# Patient Record
Sex: MALE | Race: WHITE | Employment: FULL TIME | URBAN - METROPOLITAN AREA
[De-identification: names, ages, dates, MRNs, and addresses within clinical notes are randomized per-mention and may not be internally consistent; named-entity substitution may affect disease eponyms.]

---

## 2021-12-07 ENCOUNTER — APPOINTMENT (OUTPATIENT)
Dept: CT IMAGING | Age: 86
End: 2021-12-07
Payer: MEDICARE

## 2021-12-07 ENCOUNTER — HOSPITAL ENCOUNTER (EMERGENCY)
Age: 86
Discharge: HOME | End: 2021-12-07
Attending: EMERGENCY MEDICINE
Payer: MEDICARE

## 2021-12-07 VITALS
TEMPERATURE: 98 F | DIASTOLIC BLOOD PRESSURE: 124 MMHG | OXYGEN SATURATION: 96 % | SYSTOLIC BLOOD PRESSURE: 182 MMHG | WEIGHT: 133 LBS | RESPIRATION RATE: 16 BRPM | HEART RATE: 106 BPM

## 2021-12-07 DIAGNOSIS — S00.83XA CONTUSION OF FACE, INITIAL ENCOUNTER: Primary | ICD-10-CM

## 2021-12-07 DIAGNOSIS — R04.0 EPISTAXIS DUE TO TRAUMA: ICD-10-CM

## 2021-12-07 LAB
ABSOLUTE EOS #: 0.1 K/UL (ref 0–0.4)
ABSOLUTE IMMATURE GRANULOCYTE: ABNORMAL K/UL (ref 0–0.3)
ABSOLUTE LYMPH #: 0.5 K/UL (ref 1–4.8)
ABSOLUTE MONO #: 0.6 K/UL (ref 0.1–1.3)
ANION GAP SERPL CALCULATED.3IONS-SCNC: 9 MMOL/L (ref 9–17)
BASOPHILS # BLD: 1 % (ref 0–2)
BASOPHILS ABSOLUTE: 0.1 K/UL (ref 0–0.2)
BUN BLDV-MCNC: 17 MG/DL (ref 8–23)
BUN/CREAT BLD: ABNORMAL (ref 9–20)
CALCIUM SERPL-MCNC: 8.8 MG/DL (ref 8.6–10.4)
CHLORIDE BLD-SCNC: 88 MMOL/L (ref 98–107)
CO2: 25 MMOL/L (ref 20–31)
CREAT SERPL-MCNC: 0.55 MG/DL (ref 0.7–1.2)
DIFFERENTIAL TYPE: ABNORMAL
EOSINOPHILS RELATIVE PERCENT: 1 % (ref 0–4)
GFR AFRICAN AMERICAN: >60 ML/MIN
GFR NON-AFRICAN AMERICAN: >60 ML/MIN
GFR SERPL CREATININE-BSD FRML MDRD: ABNORMAL ML/MIN/{1.73_M2}
GFR SERPL CREATININE-BSD FRML MDRD: ABNORMAL ML/MIN/{1.73_M2}
GLUCOSE BLD-MCNC: 128 MG/DL (ref 70–99)
HCT VFR BLD CALC: 29 % (ref 41–53)
HEMOGLOBIN: 10 G/DL (ref 13.5–17.5)
IMMATURE GRANULOCYTES: ABNORMAL %
INR BLD: 1.6
LYMPHOCYTES # BLD: 5 % (ref 24–44)
MCH RBC QN AUTO: 30.2 PG (ref 26–34)
MCHC RBC AUTO-ENTMCNC: 34.6 G/DL (ref 31–37)
MCV RBC AUTO: 87.3 FL (ref 80–100)
MONOCYTES # BLD: 6 % (ref 1–7)
NRBC AUTOMATED: ABNORMAL PER 100 WBC
PARTIAL THROMBOPLASTIN TIME: 40.1 SEC (ref 24–36)
PDW BLD-RTO: 15.9 % (ref 11.5–14.9)
PLATELET # BLD: 274 K/UL (ref 150–450)
PLATELET ESTIMATE: ABNORMAL
PMV BLD AUTO: 6.6 FL (ref 6–12)
POTASSIUM SERPL-SCNC: 4.3 MMOL/L (ref 3.7–5.3)
PROTHROMBIN TIME: 18.9 SEC (ref 11.8–14.6)
RBC # BLD: 3.32 M/UL (ref 4.5–5.9)
RBC # BLD: ABNORMAL 10*6/UL
SEG NEUTROPHILS: 87 % (ref 36–66)
SEGMENTED NEUTROPHILS ABSOLUTE COUNT: 9 K/UL (ref 1.3–9.1)
SODIUM BLD-SCNC: 122 MMOL/L (ref 135–144)
WBC # BLD: 10.2 K/UL (ref 3.5–11)
WBC # BLD: ABNORMAL 10*3/UL

## 2021-12-07 PROCEDURE — 99284 EMERGENCY DEPT VISIT MOD MDM: CPT

## 2021-12-07 PROCEDURE — 70450 CT HEAD/BRAIN W/O DYE: CPT

## 2021-12-07 PROCEDURE — 85730 THROMBOPLASTIN TIME PARTIAL: CPT

## 2021-12-07 PROCEDURE — 30903 CONTROL OF NOSEBLEED: CPT

## 2021-12-07 PROCEDURE — 6370000000 HC RX 637 (ALT 250 FOR IP): Performed by: EMERGENCY MEDICINE

## 2021-12-07 PROCEDURE — 80048 BASIC METABOLIC PNL TOTAL CA: CPT

## 2021-12-07 PROCEDURE — 72125 CT NECK SPINE W/O DYE: CPT

## 2021-12-07 PROCEDURE — 85025 COMPLETE CBC W/AUTO DIFF WBC: CPT

## 2021-12-07 PROCEDURE — 85610 PROTHROMBIN TIME: CPT

## 2021-12-07 PROCEDURE — 93005 ELECTROCARDIOGRAM TRACING: CPT | Performed by: EMERGENCY MEDICINE

## 2021-12-07 PROCEDURE — 70486 CT MAXILLOFACIAL W/O DYE: CPT

## 2021-12-07 PROCEDURE — 36415 COLL VENOUS BLD VENIPUNCTURE: CPT

## 2021-12-07 RX ORDER — TRANEXAMIC ACID 100 MG/ML
15 INJECTION, SOLUTION INTRAVENOUS ONCE
Status: DISCONTINUED | OUTPATIENT
Start: 2021-12-07 | End: 2021-12-08 | Stop reason: HOSPADM

## 2021-12-07 RX ORDER — ACETAMINOPHEN 500 MG
1000 TABLET ORAL ONCE
Status: DISCONTINUED | OUTPATIENT
Start: 2021-12-07 | End: 2021-12-08 | Stop reason: HOSPADM

## 2021-12-07 RX ORDER — OXYMETAZOLINE HYDROCHLORIDE 0.05 G/100ML
2 SPRAY NASAL ONCE
Status: COMPLETED | OUTPATIENT
Start: 2021-12-07 | End: 2021-12-07

## 2021-12-07 RX ADMIN — OXYMETAZOLINE HYDROCHLORIDE 2 SPRAY: 0.05 SPRAY NASAL at 15:50

## 2021-12-07 ASSESSMENT — ENCOUNTER SYMPTOMS
CHEST TIGHTNESS: 0
VOMITING: 0
NAUSEA: 0
DIARRHEA: 0
ABDOMINAL PAIN: 0
SHORTNESS OF BREATH: 0
COLOR CHANGE: 1
CONSTIPATION: 0

## 2021-12-07 NOTE — ED NOTES
Assisted patient to bed side commode patient tolerated well. Bleeding from nose still currently. Patient refusing tylenol at this time.       TEQUILA Guerra  12/07/21 1657

## 2021-12-07 NOTE — ED PROVIDER NOTES
[address]  Emergency Department Encounter  EmergencyMedicine Resident     Pt Name:Jose Elias Rao Ana Maria Perez  MRN: [id number]  [place] 11/24/1931  Date of evaluation: 12/7/21  PCP:  No primary care provider on file. CHIEF COMPLAINT       Chief Complaint   Patient presents with    Fall    Head Injury     on coumadin       HISTORY OF PRESENT ILLNESS  (Location/Symptom, Timing/Onset, Context/Setting, Quality, Duration, Modifying Factors, Severity.)      [facility] Dr Ana Maria Tariqs is a 80 y.o. male who presents with after mechanical fall in the parking lot. Patient states that he only hit his chin and nose, denies hitting his head, denies loss of consciousness. Patient is on Coumadin, does not know what his INR is. Patient denies any lightheadedness and dizziness. Patient denies any nausea, vomiting, chest pain, shortness of breath, abdominal pain, change in urination or bowel habits. Patient states that he denies any pain. Patient stated that he wants this front tooth pulled, tooth noted to be loose. Patient also had 2 other teeth that were dislodged after fall. Patient adamantly denies that he syncopated, states that he tripped over a crack when ambulating. PAST MEDICAL / SURGICAL / SOCIAL / FAMILY HISTORY      has no past medical history on file. No additional pertinent     has no past surgical history on file.   No additional pertinent    Social History     Socioeconomic History    Marital status: Single     Spouse name: Not on file    Number of children: Not on file    Years of education: Not on file    Highest education level: Not on file   Occupational History    Not on file   Tobacco Use    Smoking status: Not on file    Smokeless tobacco: Not on file   Substance and Sexual Activity    Alcohol use: Not on file    Drug use: Not on file    Sexual activity: Not on file   Other Topics Concern    Not on file   Social History Narrative    Not on file     Social Determinants of Health Financial Resource Strain:     Difficulty of Paying Living Expenses: Not on file   Food Insecurity:     Worried About Running Out of Food in the Last Year: Not on file    Salome of Food in the Last Year: Not on file   Transportation Needs:     Lack of Transportation (Medical): Not on file    Lack of Transportation (Non-Medical): Not on file   Physical Activity:     Days of Exercise per Week: Not on file    Minutes of Exercise per Session: Not on file   Stress:     Feeling of Stress : Not on file   Social Connections:     Frequency of Communication with Friends and Family: Not on file    Frequency of Social Gatherings with Friends and Family: Not on file    Attends Sabianist Services: Not on file    Active Member of [address] [place] [address] or Organizations: Not on file    Attends Club or Organization Meetings: Not on file    Marital Status: Not on file   Intimate Partner Violence:     Fear of Current or Ex-Partner: Not on file    Emotionally Abused: Not on file    Physically Abused: Not on file    Sexually Abused: Not on file   Housing Stability:     Unable to Pay for Housing in the Last Year: Not on file    Number of [place] in the Last Year: Not on file    Unstable Housing in the Last Year: Not on file       No family history on file. Allergies:  Sulfa antibiotics    Home Medications:  Prior to Admission medications    Not on File       REVIEW OF SYSTEMS    (2-9 systems for level 4, 10 or more for level 5)      Review of Systems   Constitutional: Negative for chills and fever. HENT: Positive for nosebleeds. Negative for congestion. Respiratory: Negative for chest tightness and shortness of breath. Cardiovascular: Negative for chest pain and leg swelling. Gastrointestinal: Negative for abdominal pain, constipation, diarrhea, nausea and vomiting. Endocrine: Negative for polyuria. Genitourinary: Negative for difficulty urinating. Skin: Positive for color change (Bruising). Neurological: Negative for dizziness, weakness, light-headedness and headaches. Hematological: Bruises/bleeds easily. Psychiatric/Behavioral: Negative for confusion. PHYSICAL EXAM   (up to 7 for level 4, 8 or more for level 5)      INITIAL VITALS:   BP (!) 182/124   Pulse 106   Temp 98 °F (36.7 °C)   Resp 16   Wt 133 lb (60.3 kg)   SpO2 96%     Physical Exam  Constitutional:       Appearance: Normal appearance. HENT:      Head: Normocephalic and atraumatic. No raccoon eyes or Amor's sign. Jaw: No trismus or tenderness. Right Ear: Tympanic membrane normal.      Left Ear: Tympanic membrane normal.      Ears:      Comments: No hemotympanum noted. Nose: Nasal tenderness present. No septal deviation. Right Nostril: Epistaxis present. No foreign body. Mouth/Throat:      Mouth: Mucous membranes are moist.      Pharynx: Oropharynx is clear. Eyes:      Extraocular Movements: Extraocular movements intact. Conjunctiva/sclera: Conjunctivae normal.   Cardiovascular:      Rate and Rhythm: Normal rate and regular rhythm. Pulses: Normal pulses. Heart sounds: Normal heart sounds. No murmur heard. Pulmonary:      Effort: Pulmonary effort is normal.      Breath sounds: Normal breath sounds. Abdominal:      General: Bowel sounds are normal. There is no distension. Tenderness: There is no abdominal tenderness. There is no guarding. Musculoskeletal:         General: Normal range of motion. Comments: Range of motion noted to be normal with patient's natural movements   Skin:     General: Skin is warm and dry. Findings: No rash (On exposed skin). Neurological:      General: No focal deficit present. Mental Status: He is alert and oriented to person, place, and time.    Psychiatric:         Mood and Affect: Mood normal.         Behavior: Behavior normal.         DIFFERENTIAL  DIAGNOSIS     PLAN (LABS / IMAGING / EKG):  Orders Placed This Encounter   Procedures    CT Head WO Contrast    CT CERVICAL SPINE WO CONTRAST    CT FACIAL BONES WO CONTRAST    CBC Auto Differential    Basic Metabolic Panel w/ Reflex to MG    Protime-INR    APTT    EKG 12 Lead       MEDICATIONS ORDERED:  Orders Placed This Encounter   Medications    oxymetazoline (AFRIN) 0.05 % nasal spray 2 spray    acetaminophen (TYLENOL) tablet 1,000 mg    tranexamic acid (CYKLOKAPRON) injection 905 mg         DIAGNOSTIC RESULTS / EMERGENCY DEPARTMENT COURSE / MDM   LAB RESULTS:  Results for orders placed or performed during the hospital encounter of 12/07/21   CBC Auto Differential   Result Value Ref Range    WBC 10.2 3.5 - 11.0 k/uL    RBC 3.32 (L) 4.5 - 5.9 m/uL    Hemoglobin 10.0 (L) 13.5 - 17.5 g/dL    Hematocrit 29.0 (L) 41 - 53 %    MCV 87.3 80 - 100 fL    MCH 30.2 26 - 34 pg    MCHC 34.6 31 - 37 g/dL    RDW 15.9 (H) 11.5 - 14.9 %    Platelets 110 074 - 155 k/uL    MPV 6.6 6.0 - 12.0 fL    NRBC Automated NOT REPORTED per 100 WBC    Differential Type NOT REPORTED     Seg Neutrophils 87 (H) 36 - 66 %    Lymphocytes 5 (L) 24 - 44 %    Monocytes 6 1 - 7 %    Eosinophils % 1 0 - 4 %    Basophils 1 0 - 2 %    Immature Granulocytes NOT REPORTED 0 %    Segs Absolute 9.00 1.3 - 9.1 k/uL    Absolute Lymph # 0.50 (L) 1.0 - 4.8 k/uL    Absolute Mono # 0.60 0.1 - 1.3 k/uL    Absolute Eos # 0.10 0.0 - 0.4 k/uL    Basophils Absolute 0.10 0.0 - 0.2 k/uL    Absolute Immature Granulocyte NOT REPORTED 0.00 - 0.30 k/uL    WBC Morphology NOT REPORTED     RBC Morphology NOT REPORTED     Platelet Estimate NOT REPORTED    Basic Metabolic Panel w/ Reflex to MG   Result Value Ref Range    Glucose 128 (H) 70 - 99 mg/dL    BUN 17 8 - 23 mg/dL    CREATININE 0.55 (L) 0.70 - 1.20 mg/dL    Bun/Cre Ratio NOT REPORTED 9 - 20    Calcium 8.8 8.6 - 10.4 mg/dL    Sodium 122 (L) 135 - 144 mmol/L    Potassium 4.3 3.7 - 5.3 mmol/L    Chloride 88 (L) 98 - 107 mmol/L    CO2 25 20 - 31 mmol/L    Anion Gap 9 9 - 17 mmol/L    GFR Non-African American >60 >60 mL/min    GFR African American >60 >60 mL/min    GFR Comment          GFR Staging NOT REPORTED    Protime-INR   Result Value Ref Range    Protime 18.9 (H) 11.8 - 14.6 sec    INR 1.6    APTT   Result Value Ref Range    PTT 40.1 (H) 24.0 - 36.0 sec   EKG 12 Lead   Result Value Ref Range    Ventricular Rate 99 BPM    Atrial Rate 202 BPM    QRS Duration 72 ms    Q-T Interval 324 ms    QTc Calculation (Bazett) 415 ms    R Axis 8 degrees    T Axis -11 degrees       RADIOLOGY:  CT CERVICAL SPINE WO CONTRAST   Final Result   No acute abnormality of the cervical spine. CT FACIAL BONES WO CONTRAST   Final Result   Chip fractures of the maxillary nasal processes      Otherwise no facial fracture identified         CT Head WO Contrast   Final Result   No acute intracranial abnormality. 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    EMERGENCY DEPARTMENT COURSE:  ED Course as of 12/07/21 2350   Tue Dec 07, 2021   1526 BP(!): 182/124 [CR]   1954 Offered patient admission due to having difficulty hemostasis to obtain bilateral nares, hypertension, Coumadin, with concerns from coworker about being able to take care of himself at home. Patient alert and oriented, answering questions appropriately and has decision-making capacity. Patient shows to not stay for admission.   Patient was able to ambulate under his own power without any difficulty. Patient states that he will call 911 if he becomes lightheaded or has further bleeding or any other symptoms. Patient be discharged home. [CR]   2004 Patient noted to ambulate without any difficulty with nursing staff. Patient noted to continue to have small drips of blood from the anterior nasal packing. Patient denies any lightheadedness or dizziness. Patient blood pressure did come down after ambulation. [CR]      ED Course User Index  [CR] Alber Diaz,         PROCEDURES:  Patient had nasal packing placed, initially had Mediplex placed without hemostasis of right nare. Patient did have hemostasis of the left nare. Packing was removed and Rhino Rocket, anterior, 5.5 cm was placed in the right with 6 cc of air to inflate device was placed. Patient tolerated procedure well without difficulty. Hemostasis obtained. Patient was observed after placement without any bleeding. CONSULTS:  None    MEDICAL DECISION MAKING:  Patient noted to have a witnessed mechanical fall. Patient denies any pain, chest pain, lightheadedness, dizziness any C-spine tenderness, good range of motion of neck. No change in vision, no concerns for head bleed. With patient on Coumadin CT head was obtained in addition to patient is multiple areas of ecchymosis on the face and CT facial bones were noted to demonstrate nasal bone fracture. Patient had CT C-spine which was noted to be negative due to age and concern for osteoporosis and C-spine injury. Patient had difficult to obtain epistaxis hemorrhage control. Nasal septum was evaluated and no septal hematoma was noted, mild ecchymosis noted though. Afrin was initially attempted in the nares with good hemorrhage control initially until patient stood up to use restroom which caused increased hemorrhage of the right nare.   Patient had the right nare then packed with nasal packing with improvement of hemostasis. Patient then noted to have epistaxis out of the left nare and packing was applied. Patient then had good hemorrhage control at the time of was observed. Patient then stood up to ambulate and had epistaxis from the right nare with packing in place. Packing was removed and Rhino Rocket was placed in the right nare which obtained hemostasis. Patient was advised to be admitted for his age, injury second fall, difficult hemorrhage control of epistaxis, and concerns for ability to perform activities of daily living at his home. Patient was very adamant to go home. Patient was alert and oriented and decision-making capacity. Patient was discharged home based upon his wishes. Patient was provided education to call 911 with worsening bleeding, recurrence of epistaxis, lightheadedness, dizziness or other concerns. 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 Patient was discharged home. CRITICAL CARE:  Please see attending note    FINAL IMPRESSION      1. Contusion of face, initial encounter    2. Epistaxis due to trauma          DISPOSITION / PLAN     DISPOSITION Decision To Discharge 12/07/2021 09:45:03 PM      PATIENT REFERRED TO:  Carmela Powell MD  [address] [place] [address] [phone]    Schedule an appointment as soon as possible for a visit       [address] [place] [address] [address] [address]  [facility] [address]  [address] [place] [address]  [phone]    If symptoms worsen      DISCHARGE MEDICATIONS:  There are no discharge medications for this patient.       Emily Contreras DO  Emergency Medicine Resident    (Please note that portions of thisnote were completed with a voice recognition program.  Efforts were made to edit the dictations but occasionally words are mis-transcribed.)       Jomar Frances, DO  Resident  12/07/21 [address], DO  Resident  12/08/21 5582

## 2021-12-07 NOTE — ED PROVIDER NOTES
[address] ED  eMERGENCY dEPARTMENT eNCOUnter   Attending Attestation     Pt Name: Koffi Hawkins  MRN: [id number]  [place] 11/24/1931  Date of evaluation: 12/7/21       Koffi Hawkins is a 80 y.o. male who presents with Fall and Head Injury (on coumadin)      History:   Patient had a mechanical fall misstep walking out the doors and landed on his face. Patient is on Coumadin because of A. fib. Patient denies loss of consciousness but does have bleeding out of his right nares. No neck pain or back pain. Exam: Vitals:   Vitals:    12/07/21 1524   BP: (!) 182/124   Pulse: 106   Resp: 16   Temp: 98 °F (36.7 °C)   SpO2: 96%   Weight: 33 lb (15 kg)     Patient has ecchymosis and swelling to the chin with bleeding at the right nares. Neurologically intact. I performed a history and physical examination of the patient and discussed management with the resident. I reviewed the residents note and agree with the documented findings and plan of care. Any areas of disagreement are noted on the chart. I was personally present for the key portions of any procedures. I have documented in the chart those procedures where I was not present during the key portions. I have personally reviewed all images and agree with the resident's interpretation. I have reviewed the emergency nurses triage note. I agree with the chief complaint, past medical history, past surgical history, allergies, medications, social and family history as documented unless otherwise noted below. Documentation of the HPI, Physical Exam and Medical Decision Making performed by medical students or scribes is based on my personal performance of the HPI, PE and MDM. I personally evaluated and examined the patient in conjunction with the APC and agree with the assessment, treatment plan, and disposition of the patient as recorded by the APC. Additional findings are as noted.     Heather Tan MD  Attending Emergency  Physician             Norah Key [employer], MD  12/07/21 6741

## 2021-12-07 NOTE — ED NOTES
IV insertion attempted 2 times were unsuccessful. Patient refuses any more attempts and blood draws at this time. Dr. Sara Chaidez.       Mari, RN  12/07/21 [address], RN  12/07/21 3827

## 2021-12-08 LAB
EKG ATRIAL RATE: 202 BPM
EKG Q-T INTERVAL: 324 MS
EKG QRS DURATION: 72 MS
EKG QTC CALCULATION (BAZETT): 415 MS
EKG R AXIS: 8 DEGREES
EKG T AXIS: -11 DEGREES
EKG VENTRICULAR RATE: 99 BPM

## 2021-12-08 PROCEDURE — 93010 ELECTROCARDIOGRAM REPORT: CPT | Performed by: INTERNAL MEDICINE

## 2021-12-08 NOTE — ED NOTES
Ambulated patient in [facility] per Dr. Felipe Denise request.     Kaur Prasad RN  12/07/21 2004

## 2022-02-08 ENCOUNTER — OFFICE VISIT (OUTPATIENT)
Dept: UROLOGY | Age: 87
End: 2022-02-08
Payer: MEDICARE

## 2022-02-08 VITALS
DIASTOLIC BLOOD PRESSURE: 85 MMHG | HEART RATE: 64 BPM | HEIGHT: 64 IN | WEIGHT: 133 LBS | SYSTOLIC BLOOD PRESSURE: 122 MMHG | BODY MASS INDEX: 22.71 KG/M2 | TEMPERATURE: 96.4 F

## 2022-02-08 DIAGNOSIS — N50.89 SCROTAL EDEMA: Primary | ICD-10-CM

## 2022-02-08 PROCEDURE — 4004F PT TOBACCO SCREEN RCVD TLK: CPT | Performed by: UROLOGY

## 2022-02-08 PROCEDURE — G8428 CUR MEDS NOT DOCUMENT: HCPCS | Performed by: UROLOGY

## 2022-02-08 PROCEDURE — 4040F PNEUMOC VAC/ADMIN/RCVD: CPT | Performed by: UROLOGY

## 2022-02-08 PROCEDURE — 1123F ACP DISCUSS/DSCN MKR DOCD: CPT | Performed by: UROLOGY

## 2022-02-08 PROCEDURE — G8484 FLU IMMUNIZE NO ADMIN: HCPCS | Performed by: UROLOGY

## 2022-02-08 PROCEDURE — 99204 OFFICE O/P NEW MOD 45 MIN: CPT | Performed by: UROLOGY

## 2022-02-08 PROCEDURE — G8420 CALC BMI NORM PARAMETERS: HCPCS | Performed by: UROLOGY

## 2022-02-08 RX ORDER — WARFARIN SODIUM 4 MG/1
4 TABLET ORAL DAILY
COMMUNITY

## 2022-02-08 RX ORDER — ASPIRIN 81 MG/1
81 TABLET ORAL DAILY
Status: ON HOLD | COMMUNITY
End: 2022-03-03 | Stop reason: HOSPADM

## 2022-02-08 RX ORDER — DIGOXIN 125 MCG
125 TABLET ORAL DAILY
Status: ON HOLD | COMMUNITY
Start: 2021-12-16 | End: 2022-03-03 | Stop reason: SDUPTHER

## 2022-02-08 RX ORDER — FUROSEMIDE 20 MG/1
20 TABLET ORAL DAILY
Status: ON HOLD | COMMUNITY
Start: 2022-01-07 | End: 2022-03-03 | Stop reason: HOSPADM

## 2022-02-08 RX ORDER — OXYBUTYNIN CHLORIDE 5 MG/1
5 TABLET ORAL DAILY
Status: ON HOLD | COMMUNITY
End: 2022-03-30

## 2022-02-08 ASSESSMENT — ENCOUNTER SYMPTOMS
DIARRHEA: 0
VOMITING: 0
NAUSEA: 0
RESPIRATORY NEGATIVE: 1
WHEEZING: 0
GASTROINTESTINAL NEGATIVE: 1
EYES NEGATIVE: 1
SHORTNESS OF BREATH: 0
CONSTIPATION: 0
EYE REDNESS: 0
ABDOMINAL PAIN: 0
EYE PAIN: 0
BACK PAIN: 0
COUGH: 0

## 2022-02-08 NOTE — PROGRESS NOTES
Review of Systems   Constitutional: Negative. Negative for appetite change, chills and fatigue. Eyes: Negative. Negative for pain, redness and visual disturbance. Respiratory: Negative. Negative for cough, shortness of breath and wheezing. Cardiovascular: Negative. Negative for chest pain and leg swelling. Gastrointestinal: Negative. Negative for abdominal pain, constipation, diarrhea, nausea and vomiting. Genitourinary: Positive for difficulty urinating. Negative for dysuria, flank pain, frequency, hematuria and urgency. Musculoskeletal: Negative. Negative for back pain, joint swelling and myalgias. Skin: Negative. Negative for rash and wound. Neurological: Negative. Negative for dizziness, weakness and numbness. Hematological: Negative. Does not bruise/bleed easily.

## 2022-02-08 NOTE — PROGRESS NOTES
[address] [address]  Dept: [phone]  Dept Fax: [phone] [facility] Urology Office Note - New patient    Patient:  Hilary Newman Dr Keily Davidson  YOB: 1931  Date: 2/8/2022    The patient is a 80 y.o. male who presents todayfor evaluation of the following problems:   Chief Complaint   Patient presents with    New Patient     hydrocele- OK per     referred by No primary care provider on file. Servando Henriquezates HPI  He is here for a large left hydrocele. He had a bad fall in December. He has had swelling in his left testicle. He feels like it has become bigger recently. He is having some issues voiding. No hematuria or dysuria. (Patient's old records have been requested, reviewed and summarized in today's note.)    Summary of old records: N/A    Additional History: N/A    Procedures Today: N/A    Last several PSA's:  No results found for: PSA  Last total testosterone:  No results found for: TESTOSTERONE  Urinalysis today:  No results found for this visit on 02/08/22. AUA Symptom Score (2/8/2022): Last BUN and creatinine:  Lab Results   Component Value Date    BUN 17 12/07/2021     Lab Results   Component Value Date    CREATININE 0.55 (L) 12/07/2021       Additional Lab/Culture results: none    Imaging Reviewed during this Office Visit: none  (results were independently reviewed by physician and radiology report verified)    PAST MEDICAL, FAMILY AND SOCIAL HISTORY:  No past medical history on file. No past surgical history on file. No family history on file.   Outpatient Medications Marked as Taking for the 2/8/22 encounter (Office Visit) with Lamont Guy MD   Medication Sig Dispense Refill    aspirin 81 MG EC tablet Take 81 mg by mouth daily      digoxin (LANOXIN) 125 MCG tablet       oxybutynin (DITROPAN) 5 MG tablet daily  warfarin (COUMADIN) 4 MG tablet Take 4 mg by mouth daily      furosemide (LASIX) 20 MG tablet       dilTIAZem (CARDIZEM) 30 MG tablet every 8 hours          Sulfa antibiotics  Social History     Tobacco Use   Smoking Status Not on file   Smokeless Tobacco Not on file      (If patient a smoker, smoking cessation counseling offered)   Social History     Substance and Sexual Activity   Alcohol Use Not on file       REVIEW OF SYSTEMS:  Review of Systems    Physical Exam:    This a 80 y.o. male   Vitals:    02/08/22 1408   BP: 122/85   Pulse: 64   Temp: 96.4 °F (35.8 °C)     Body mass index is 22.83 kg/m². Physical Exam  Constitutional: Patient in no acute distress. Neuro: Alert and oriented to person, place and time. Psych: Mood normal, affect normal  Skin: No rash noted  Lungs:Respiratory effort is normal  Cardiovascular: Warm & Pink  Abdomen: Soft, non-tender, non-distendedwith no CVA,  No flank tenderness,  Orhepatosplenomegaly   Lymphatics: No palpable lymphadenopathy. Bladder non-tender and not distended. Musculoskeletal: Normal gait and station  Penis normal and circumcised  Urethral meatus normal  Scrotal exam normal  Testicles normal bilaterally  Epididymis normal bilaterally  No evidence of inguinal hernia  Sig LE edema noted. Scrotum is swollen, related to fluid overload, not actual hydrocele. Assessment and Plan      1. Scrotal edema           Plan:        He has significant scrotal edema. No hydrocele noted. He has severe LE edema as well. I strongly recommended he sees IM or goes to ER for fluid overload. Prescriptions Ordered:  No orders of the defined types were placed in this encounter. Orders Placed:  No orders of the defined types were placed in this encounter. Raiza Sepulveda MD    Agree with the ROS entered by the MA.

## 2022-02-28 ENCOUNTER — HOSPITAL ENCOUNTER (INPATIENT)
Age: 87
LOS: 4 days | Discharge: NURSING, REHAB OR LONG-TERM CARE | DRG: 309 | End: 2022-03-04
Attending: EMERGENCY MEDICINE | Admitting: STUDENT IN AN ORGANIZED HEALTH CARE EDUCATION/TRAINING PROGRAM
Payer: MEDICARE

## 2022-02-28 ENCOUNTER — APPOINTMENT (OUTPATIENT)
Dept: GENERAL RADIOLOGY | Age: 87
DRG: 309 | End: 2022-02-28
Payer: MEDICARE

## 2022-02-28 DIAGNOSIS — R53.1 GENERAL WEAKNESS: Primary | ICD-10-CM

## 2022-02-28 DIAGNOSIS — I48.91 ATRIAL FIBRILLATION, UNSPECIFIED TYPE (HCC): ICD-10-CM

## 2022-02-28 LAB
ABSOLUTE EOS #: 0 K/UL (ref 0–0.4)
ABSOLUTE IMMATURE GRANULOCYTE: 0 K/UL (ref 0–0.3)
ABSOLUTE LYMPH #: 0.42 K/UL (ref 1–4.8)
ABSOLUTE MONO #: 0.84 K/UL (ref 0.2–0.8)
ALBUMIN SERPL-MCNC: 3 G/DL (ref 3.5–5.2)
ALP BLD-CCNC: 100 U/L (ref 40–129)
ALT SERPL-CCNC: 59 U/L (ref 5–41)
ANION GAP SERPL CALCULATED.3IONS-SCNC: 11 MMOL/L (ref 9–17)
AST SERPL-CCNC: 105 U/L
BASOPHILS # BLD: 0 %
BASOPHILS ABSOLUTE: 0 K/UL (ref 0–0.2)
BILIRUB SERPL-MCNC: 0.96 MG/DL (ref 0.3–1.2)
BILIRUBIN URINE: NEGATIVE
BUN BLDV-MCNC: 65 MG/DL (ref 8–23)
BUN/CREAT BLD: 63 (ref 9–20)
CALCIUM SERPL-MCNC: 8.6 MG/DL (ref 8.6–10.4)
CHLORIDE BLD-SCNC: 103 MMOL/L (ref 98–107)
CO2: 27 MMOL/L (ref 20–31)
COLOR: YELLOW
CREAT SERPL-MCNC: 1.04 MG/DL (ref 0.7–1.2)
DIGOXIN LEVEL: <0.3 NG/ML (ref 0.5–2)
EOSINOPHILS RELATIVE PERCENT: 0 % (ref 1–4)
GFR AFRICAN AMERICAN: >60 ML/MIN
GFR NON-AFRICAN AMERICAN: >60 ML/MIN
GFR SERPL CREATININE-BSD FRML MDRD: ABNORMAL ML/MIN/{1.73_M2}
GLUCOSE BLD-MCNC: 124 MG/DL (ref 70–99)
GLUCOSE URINE: NEGATIVE
HCT VFR BLD CALC: 34.9 % (ref 40.7–50.3)
HEMOGLOBIN: 9.9 G/DL (ref 13–17)
IMMATURE GRANULOCYTES: 0 %
INR BLD: 3.4
KETONES, URINE: NEGATIVE
LEUKOCYTE ESTERASE, URINE: NEGATIVE
LYMPHOCYTES # BLD: 4 % (ref 24–44)
MCH RBC QN AUTO: 25.3 PG (ref 25.2–33.5)
MCHC RBC AUTO-ENTMCNC: 28.4 G/DL (ref 28.4–34.8)
MCV RBC AUTO: 89.3 FL (ref 82.6–102.9)
MONOCYTES # BLD: 8 % (ref 1–7)
MORPHOLOGY: ABNORMAL
NITRITE, URINE: NEGATIVE
NRBC AUTOMATED: 0 PER 100 WBC
PDW BLD-RTO: 19 % (ref 11.8–14.4)
PH UA: 6 (ref 5–8)
PLATELET # BLD: 222 K/UL (ref 138–453)
PMV BLD AUTO: 10.4 FL (ref 8.1–13.5)
POTASSIUM SERPL-SCNC: 3.6 MMOL/L (ref 3.7–5.3)
PRO-BNP: ABNORMAL PG/ML
PROTEIN UA: NEGATIVE
PROTHROMBIN TIME: 33 SEC (ref 11.5–14.2)
RBC # BLD: 3.91 M/UL (ref 4.21–5.77)
RBC # BLD: ABNORMAL 10*6/UL
SEG NEUTROPHILS: 88 % (ref 36–66)
SEGMENTED NEUTROPHILS ABSOLUTE COUNT: 9.24 K/UL (ref 1.8–7.7)
SODIUM BLD-SCNC: 141 MMOL/L (ref 135–144)
SPECIFIC GRAVITY UA: 1.02 (ref 1–1.03)
TOTAL PROTEIN: 5.8 G/DL (ref 6.4–8.3)
TROPONIN, HIGH SENSITIVITY: 101 NG/L (ref 0–22)
TROPONIN, HIGH SENSITIVITY: 98 NG/L (ref 0–22)
TURBIDITY: CLEAR
URINE HGB: NEGATIVE
UROBILINOGEN, URINE: NORMAL
WBC # BLD: 10.5 K/UL (ref 3.5–11.3)

## 2022-02-28 PROCEDURE — 80053 COMPREHEN METABOLIC PANEL: CPT

## 2022-02-28 PROCEDURE — 96365 THER/PROPH/DIAG IV INF INIT: CPT

## 2022-02-28 PROCEDURE — 80162 ASSAY OF DIGOXIN TOTAL: CPT

## 2022-02-28 PROCEDURE — 83880 ASSAY OF NATRIURETIC PEPTIDE: CPT

## 2022-02-28 PROCEDURE — 81003 URINALYSIS AUTO W/O SCOPE: CPT

## 2022-02-28 PROCEDURE — 99222 1ST HOSP IP/OBS MODERATE 55: CPT | Performed by: NURSE PRACTITIONER

## 2022-02-28 PROCEDURE — 85610 PROTHROMBIN TIME: CPT

## 2022-02-28 PROCEDURE — 2060000000 HC ICU INTERMEDIATE R&B

## 2022-02-28 PROCEDURE — 96374 THER/PROPH/DIAG INJ IV PUSH: CPT

## 2022-02-28 PROCEDURE — 71045 X-RAY EXAM CHEST 1 VIEW: CPT

## 2022-02-28 PROCEDURE — 2500000003 HC RX 250 WO HCPCS: Performed by: EMERGENCY MEDICINE

## 2022-02-28 PROCEDURE — 2580000003 HC RX 258: Performed by: EMERGENCY MEDICINE

## 2022-02-28 PROCEDURE — 93005 ELECTROCARDIOGRAM TRACING: CPT | Performed by: EMERGENCY MEDICINE

## 2022-02-28 PROCEDURE — 99283 EMERGENCY DEPT VISIT LOW MDM: CPT

## 2022-02-28 PROCEDURE — 96376 TX/PRO/DX INJ SAME DRUG ADON: CPT

## 2022-02-28 PROCEDURE — 84484 ASSAY OF TROPONIN QUANT: CPT

## 2022-02-28 PROCEDURE — 6360000002 HC RX W HCPCS: Performed by: EMERGENCY MEDICINE

## 2022-02-28 PROCEDURE — 96361 HYDRATE IV INFUSION ADD-ON: CPT

## 2022-02-28 PROCEDURE — 85025 COMPLETE CBC W/AUTO DIFF WBC: CPT

## 2022-02-28 RX ORDER — DEXTROSE MONOHYDRATE 50 MG/ML
100 INJECTION, SOLUTION INTRAVENOUS PRN
Status: DISCONTINUED | OUTPATIENT
Start: 2022-02-28 | End: 2022-03-04 | Stop reason: HOSPADM

## 2022-02-28 RX ORDER — NICOTINE POLACRILEX 4 MG
15 LOZENGE BUCCAL PRN
Status: DISCONTINUED | OUTPATIENT
Start: 2022-02-28 | End: 2022-03-04 | Stop reason: HOSPADM

## 2022-02-28 RX ORDER — DEXTROSE MONOHYDRATE 25 G/50ML
12.5 INJECTION, SOLUTION INTRAVENOUS PRN
Status: DISCONTINUED | OUTPATIENT
Start: 2022-02-28 | End: 2022-02-28 | Stop reason: ALTCHOICE

## 2022-02-28 RX ORDER — SODIUM CHLORIDE 9 MG/ML
25 INJECTION, SOLUTION INTRAVENOUS PRN
Status: DISCONTINUED | OUTPATIENT
Start: 2022-02-28 | End: 2022-03-04 | Stop reason: HOSPADM

## 2022-02-28 RX ORDER — ACETAMINOPHEN 650 MG/1
650 SUPPOSITORY RECTAL EVERY 6 HOURS PRN
Status: DISCONTINUED | OUTPATIENT
Start: 2022-02-28 | End: 2022-03-04 | Stop reason: HOSPADM

## 2022-02-28 RX ORDER — 0.9 % SODIUM CHLORIDE 0.9 %
1000 INTRAVENOUS SOLUTION INTRAVENOUS ONCE
Status: COMPLETED | OUTPATIENT
Start: 2022-02-28 | End: 2022-02-28

## 2022-02-28 RX ORDER — 0.9 % SODIUM CHLORIDE 0.9 %
250 INTRAVENOUS SOLUTION INTRAVENOUS ONCE
Status: COMPLETED | OUTPATIENT
Start: 2022-02-28 | End: 2022-02-28

## 2022-02-28 RX ORDER — FUROSEMIDE 10 MG/ML
20 INJECTION INTRAMUSCULAR; INTRAVENOUS ONCE
Status: COMPLETED | OUTPATIENT
Start: 2022-02-28 | End: 2022-02-28

## 2022-02-28 RX ORDER — ONDANSETRON 2 MG/ML
4 INJECTION INTRAMUSCULAR; INTRAVENOUS EVERY 6 HOURS PRN
Status: DISCONTINUED | OUTPATIENT
Start: 2022-02-28 | End: 2022-03-04 | Stop reason: HOSPADM

## 2022-02-28 RX ORDER — ACETAMINOPHEN 325 MG/1
650 TABLET ORAL EVERY 6 HOURS PRN
Status: DISCONTINUED | OUTPATIENT
Start: 2022-02-28 | End: 2022-03-04 | Stop reason: HOSPADM

## 2022-02-28 RX ORDER — DILTIAZEM HYDROCHLORIDE 5 MG/ML
10 INJECTION INTRAVENOUS ONCE
Status: COMPLETED | OUTPATIENT
Start: 2022-02-28 | End: 2022-02-28

## 2022-02-28 RX ORDER — SODIUM CHLORIDE 0.9 % (FLUSH) 0.9 %
5-40 SYRINGE (ML) INJECTION EVERY 12 HOURS SCHEDULED
Status: DISCONTINUED | OUTPATIENT
Start: 2022-03-01 | End: 2022-03-04 | Stop reason: HOSPADM

## 2022-02-28 RX ORDER — ONDANSETRON 4 MG/1
4 TABLET, ORALLY DISINTEGRATING ORAL EVERY 8 HOURS PRN
Status: DISCONTINUED | OUTPATIENT
Start: 2022-02-28 | End: 2022-03-04 | Stop reason: HOSPADM

## 2022-02-28 RX ORDER — POLYETHYLENE GLYCOL 3350 17 G/17G
17 POWDER, FOR SOLUTION ORAL DAILY PRN
Status: DISCONTINUED | OUTPATIENT
Start: 2022-02-28 | End: 2022-03-04 | Stop reason: HOSPADM

## 2022-02-28 RX ORDER — SODIUM CHLORIDE 0.9 % (FLUSH) 0.9 %
10 SYRINGE (ML) INJECTION PRN
Status: DISCONTINUED | OUTPATIENT
Start: 2022-02-28 | End: 2022-03-04 | Stop reason: HOSPADM

## 2022-02-28 RX ADMIN — DILTIAZEM HYDROCHLORIDE 7.5 MG/HR: 5 INJECTION, SOLUTION INTRAVENOUS at 21:43

## 2022-02-28 RX ADMIN — FUROSEMIDE 20 MG: 10 INJECTION, SOLUTION INTRAMUSCULAR; INTRAVENOUS at 21:23

## 2022-02-28 RX ADMIN — SODIUM CHLORIDE 1000 ML: 9 INJECTION, SOLUTION INTRAVENOUS at 19:38

## 2022-02-28 RX ADMIN — DILTIAZEM HYDROCHLORIDE 5 MG/HR: 5 INJECTION, SOLUTION INTRAVENOUS at 21:18

## 2022-02-28 RX ADMIN — SODIUM CHLORIDE 250 ML: 9 INJECTION, SOLUTION INTRAVENOUS at 21:06

## 2022-02-28 RX ADMIN — DILTIAZEM HYDROCHLORIDE 12.5 MG/HR: 5 INJECTION, SOLUTION INTRAVENOUS at 23:11

## 2022-02-28 RX ADMIN — DILTIAZEM HYDROCHLORIDE 10 MG: 5 INJECTION INTRAVENOUS at 20:17

## 2022-02-28 RX ADMIN — DILTIAZEM HYDROCHLORIDE 10 MG/HR: 5 INJECTION, SOLUTION INTRAVENOUS at 22:35

## 2022-02-28 RX ADMIN — DILTIAZEM HYDROCHLORIDE 2.5 MG/HR: 5 INJECTION, SOLUTION INTRAVENOUS at 21:09

## 2022-03-01 PROBLEM — N32.81 OVERACTIVE BLADDER: Status: ACTIVE | Noted: 2022-03-01

## 2022-03-01 PROBLEM — E11.9 DIABETES MELLITUS (HCC): Status: ACTIVE | Noted: 2022-03-01

## 2022-03-01 PROBLEM — R79.89 ELEVATED BRAIN NATRIURETIC PEPTIDE (BNP) LEVEL: Status: ACTIVE | Noted: 2022-03-01

## 2022-03-01 PROBLEM — E87.6 HYPOKALEMIA: Status: ACTIVE | Noted: 2022-03-01

## 2022-03-01 PROBLEM — E03.9 HYPOTHYROIDISM: Status: ACTIVE | Noted: 2022-03-01

## 2022-03-01 LAB
ALBUMIN SERPL-MCNC: 3 G/DL (ref 3.5–5.2)
ALP BLD-CCNC: 98 U/L (ref 40–129)
ALT SERPL-CCNC: 53 U/L (ref 5–41)
ANION GAP SERPL CALCULATED.3IONS-SCNC: 10 MMOL/L (ref 9–17)
AST SERPL-CCNC: 81 U/L
BILIRUB SERPL-MCNC: 0.79 MG/DL (ref 0.3–1.2)
BUN BLDV-MCNC: 59 MG/DL (ref 8–23)
BUN/CREAT BLD: 61 (ref 9–20)
CALCIUM SERPL-MCNC: 9.2 MG/DL (ref 8.6–10.4)
CHLORIDE BLD-SCNC: 104 MMOL/L (ref 98–107)
CO2: 29 MMOL/L (ref 20–31)
CREAT SERPL-MCNC: 0.96 MG/DL (ref 0.7–1.2)
EKG ATRIAL RATE: 156 BPM
EKG Q-T INTERVAL: 256 MS
EKG QRS DURATION: 70 MS
EKG QTC CALCULATION (BAZETT): 407 MS
EKG R AXIS: 47 DEGREES
EKG T AXIS: 136 DEGREES
EKG VENTRICULAR RATE: 152 BPM
GFR AFRICAN AMERICAN: >60 ML/MIN
GFR NON-AFRICAN AMERICAN: >60 ML/MIN
GFR SERPL CREATININE-BSD FRML MDRD: ABNORMAL ML/MIN/{1.73_M2}
GLUCOSE BLD-MCNC: 130 MG/DL (ref 70–99)
INR BLD: 3.1
MAGNESIUM: 2.5 MG/DL (ref 1.6–2.6)
POTASSIUM SERPL-SCNC: 3.3 MMOL/L (ref 3.7–5.3)
PRO-BNP: 8900 PG/ML
PROTHROMBIN TIME: 31 SEC (ref 11.5–14.2)
SODIUM BLD-SCNC: 143 MMOL/L (ref 135–144)
TOTAL PROTEIN: 5.5 G/DL (ref 6.4–8.3)
TROPONIN, HIGH SENSITIVITY: 100 NG/L (ref 0–22)
TROPONIN, HIGH SENSITIVITY: 113 NG/L (ref 0–22)
TSH SERPL DL<=0.05 MIU/L-ACNC: 3.86 MIU/L (ref 0.3–5)

## 2022-03-01 PROCEDURE — 2500000003 HC RX 250 WO HCPCS: Performed by: NURSE PRACTITIONER

## 2022-03-01 PROCEDURE — 2060000000 HC ICU INTERMEDIATE R&B

## 2022-03-01 PROCEDURE — 2580000003 HC RX 258: Performed by: NURSE PRACTITIONER

## 2022-03-01 PROCEDURE — 83880 ASSAY OF NATRIURETIC PEPTIDE: CPT

## 2022-03-01 PROCEDURE — 2580000003 HC RX 258: Performed by: STUDENT IN AN ORGANIZED HEALTH CARE EDUCATION/TRAINING PROGRAM

## 2022-03-01 PROCEDURE — 84484 ASSAY OF TROPONIN QUANT: CPT

## 2022-03-01 PROCEDURE — 83735 ASSAY OF MAGNESIUM: CPT

## 2022-03-01 PROCEDURE — 84443 ASSAY THYROID STIM HORMONE: CPT

## 2022-03-01 PROCEDURE — 80053 COMPREHEN METABOLIC PANEL: CPT

## 2022-03-01 PROCEDURE — 6370000000 HC RX 637 (ALT 250 FOR IP): Performed by: STUDENT IN AN ORGANIZED HEALTH CARE EDUCATION/TRAINING PROGRAM

## 2022-03-01 PROCEDURE — 36415 COLL VENOUS BLD VENIPUNCTURE: CPT

## 2022-03-01 PROCEDURE — 99232 SBSQ HOSP IP/OBS MODERATE 35: CPT | Performed by: STUDENT IN AN ORGANIZED HEALTH CARE EDUCATION/TRAINING PROGRAM

## 2022-03-01 PROCEDURE — 85610 PROTHROMBIN TIME: CPT

## 2022-03-01 RX ORDER — DILTIAZEM HYDROCHLORIDE 120 MG/1
120 CAPSULE, COATED, EXTENDED RELEASE ORAL DAILY
Status: DISCONTINUED | OUTPATIENT
Start: 2022-03-01 | End: 2022-03-02

## 2022-03-01 RX ORDER — 0.9 % SODIUM CHLORIDE 0.9 %
250 INTRAVENOUS SOLUTION INTRAVENOUS ONCE
Status: COMPLETED | OUTPATIENT
Start: 2022-03-01 | End: 2022-03-01

## 2022-03-01 RX ORDER — LANOLIN ALCOHOL/MO/W.PET/CERES
325 CREAM (GRAM) TOPICAL
Status: ON HOLD | COMMUNITY
End: 2022-03-03 | Stop reason: HOSPADM

## 2022-03-01 RX ORDER — MAGNESIUM OXIDE 400 MG/1
400 TABLET ORAL DAILY
Status: ON HOLD | COMMUNITY
End: 2022-03-03 | Stop reason: HOSPADM

## 2022-03-01 RX ORDER — POTASSIUM CHLORIDE 20 MEQ/1
40 TABLET, EXTENDED RELEASE ORAL ONCE
Status: COMPLETED | OUTPATIENT
Start: 2022-03-01 | End: 2022-03-01

## 2022-03-01 RX ADMIN — SODIUM CHLORIDE 250 ML: 9 INJECTION, SOLUTION INTRAVENOUS at 12:02

## 2022-03-01 RX ADMIN — SODIUM CHLORIDE, PRESERVATIVE FREE 10 ML: 5 INJECTION INTRAVENOUS at 21:02

## 2022-03-01 RX ADMIN — DILTIAZEM HYDROCHLORIDE 120 MG: 120 CAPSULE, EXTENDED RELEASE ORAL at 12:07

## 2022-03-01 RX ADMIN — DILTIAZEM HYDROCHLORIDE 15 MG/HR: 5 INJECTION, SOLUTION INTRAVENOUS at 03:59

## 2022-03-01 RX ADMIN — DILTIAZEM HYDROCHLORIDE 6 MG/HR: 5 INJECTION, SOLUTION INTRAVENOUS at 14:15

## 2022-03-01 RX ADMIN — POTASSIUM CHLORIDE 40 MEQ: 20 TABLET, EXTENDED RELEASE ORAL at 09:40

## 2022-03-01 ASSESSMENT — PAIN SCALES - GENERAL
PAINLEVEL_OUTOF10: 0

## 2022-03-01 NOTE — H&P
[facility]  Office: [address], DO, Meera Varela, DO, Maty Multani, DO, Stacey Queen, DO, Brain Dancer, MD, Mei Garcia MD, Natacha Whalen MD, Artemio Maya MD, Batsheva Calles MD, Burgess Rod MD, Yolie Coats MD, Zohreh Asif, DO, Catie Resendez DO, Cinthia Davenport MD,  Oliver Bowling DO, Arianna Morel MD, Alvarez Castaneda MD, Kiley Madera MD, Momo Serrano MD, Avtar Mccarthy MD, Bigg Ojeda, [facility], [facility], [facility], Miguel Putnam, CNP, Carlos Benitez, CNS, Abril Kennedy, CNP, Martha Salcedo, CNP, Albert Perry, CNP, Monie Zuleta, CNP, Author Gustavo, [facility], Justine Gavin PA-C, Nneka Johnson, DNP, Luis Rock, [facility], Geno Huddleston, CNP, Diana Duenas, CNP, Florinda Del Valle, CNP, Minerva Naranjo, CNP, Alessio Rowe, CNP, Joe Forrest, CNP         [address]    HISTORY AND PHYSICAL EXAMINATION            Date:   3/1/2022  Patient name:  Augusta Corderovernon Islas  Date of admission:  2/28/2022  7:09 PM  MRN:   [id number]  Account:  [de-identified]  YOB: 1931  PCP:    No primary care provider on file. Room:   2042/2042-01  Code Status:    Full Code    Chief Complaint:     Chief Complaint   Patient presents with    Atrial Fibrillation    Fatigue    Dehydration       History Obtained From:     patient, electronic medical record    History of Present Illness:     Bethluis enrique Gilvernon Islas is a 80 y.o. Non- / non  male who presents with Atrial Fibrillation, Fatigue, and Dehydration   and is admitted to the hospital for the management of A-fib [facility]). He has a history of A. fib on Coumadin, digoxin and Cardizem. He was brought to the emergency room by borisad for weakness that started 4 days ago. He states that he feels dehydrated. He denies chest pain, shortness of breath, headache, slurred speech, numbness or focal weakness. To be in A. fib. BNP is elevated over 10,000. Troponin is also 101 followed by 98. Digoxin level is low. Past Medical History:     Past Medical History:   Diagnosis Date    Anemia     Atrial fibrillation (HCC)     Atrophy of testis     Hyperlipidemia     Hypothyroidism     Osteoporosis     Overactive bladder     Palpitations     Prostate cancer ([facility] [address]     Vitamin D deficiency     Weight loss         Past Surgical History:     History reviewed. No pertinent surgical history. Medications Prior to Admission:     Prior to Admission medications    Medication Sig Start Date End Date Taking? Authorizing Provider   ferrous sulfate (FE TABS 325) 325 (65 Fe) MG EC tablet Take 325 mg by mouth 3 times daily (with meals)   Yes Historical Provider, MD   magnesium oxide (MAG-OX) 400 MG tablet Take 400 mg by mouth daily   Yes Historical Provider, MD   aspirin 81 MG EC tablet Take 81 mg by mouth daily   Yes Historical Provider, MD   warfarin (COUMADIN) 4 MG tablet Take 4 mg by mouth daily   Yes Historical Provider, MD   furosemide (LASIX) 20 MG tablet Take 20 mg by mouth daily  1/7/22  Yes Historical Provider, MD   digoxin (LANOXIN) 125 MCG tablet Take 125 mcg by mouth daily  12/16/21   Historical Provider, MD   oxybutynin (DITROPAN) 5 MG tablet Take 5 mg by mouth daily     Historical Provider, MD   dilTIAZem (CARDIZEM) 30 MG tablet Take 30 mg by mouth every 8 hours     Historical Provider, MD        Allergies:     Sulfa antibiotics    Social History:     Tobacco:    reports that he has never smoked. He has never used smokeless tobacco.  Alcohol:      reports no history of alcohol use. Drug Use:  reports no history of drug use. Family History:     History reviewed. No pertinent family history. Review of Systems:     Positive and Negative as described in HPI. Review of Systems   All other systems reviewed and are negative.       Physical Exam:   BP (!) 95/59   Pulse 95   Temp 97.9 °F (36.6 °C) (Temporal)   Resp 19   Ht 5' 4\" (1.626 m)   Wt 120 lb (54.4 kg)   SpO2 93%   BMI 20.60 kg/m²   Temp (24hrs), Av.3 °F (36.8 °C), Min:97.9 °F (36.6 °C), Max:98.7 °F (37.1 °C)    No results for input(s): POCGLU in the last 72 hours. Intake/Output Summary (Last 24 hours) at 3/1/2022 0521  Last data filed at 3/1/2022 0323  Gross per 24 hour   Intake 387 ml   Output 550 ml   Net -163 ml       Physical Exam  Constitutional:       General: He is not in acute distress. HENT:      Right Ear: External ear normal.      Left Ear: External ear normal.   Eyes:      General:         Right eye: No discharge. Left eye: No discharge. Conjunctiva/sclera: Conjunctivae normal.   Cardiovascular:      Rate and Rhythm: Normal rate. Rhythm irregular. Pulmonary:      Effort: No respiratory distress. Breath sounds: Normal breath sounds. Abdominal:      General: There is no distension. Palpations: Abdomen is soft. Musculoskeletal:      Right lower leg: No edema. Left lower leg: No edema. Skin:     General: Skin is warm and dry. Neurological:      Mental Status: He is alert. Cranial Nerves: No cranial nerve deficit. Motor: No weakness.          Investigations:      Laboratory Testing:  Recent Results (from the past 24 hour(s))   Comprehensive Metabolic Panel w/ Reflex to MG    Collection Time: 22  7:20 PM   Result Value Ref Range    Glucose 124 (H) 70 - 99 mg/dL    BUN 65 (H) 8 - 23 mg/dL    CREATININE 1.04 0.70 - 1.20 mg/dL    Bun/Cre Ratio 63 (H) 9 - 20    Calcium 8.6 8.6 - 10.4 mg/dL    Sodium 141 135 - 144 mmol/L    Potassium 3.6 (L) 3.7 - 5.3 mmol/L    Chloride 103 98 - 107 mmol/L    CO2 27 20 - 31 mmol/L    Anion Gap 11 9 - 17 mmol/L    Alkaline Phosphatase 100 40 - 129 U/L    ALT 59 (H) 5 - 41 U/L     (H) <40 U/L    Total Bilirubin 0.96 0.3 - 1.2 mg/dL    Total Protein 5.8 (L) 6.4 - 8.3 g/dL    Albumin 3.0 (L) 3.5 - 5.2 g/dL    GFR Non-African American >60 >60 mL/min    GFR African American >60 >60 mL/min    GFR Comment         CBC with Auto Differential Collection Time: 02/28/22  7:20 PM   Result Value Ref Range    WBC 10.5 3.5 - 11.3 k/uL    RBC 3.91 (L) 4.21 - 5.77 m/uL    Hemoglobin 9.9 (L) 13.0 - 17.0 g/dL    Hematocrit 34.9 (L) 40.7 - 50.3 %    MCV 89.3 82.6 - 102.9 fL    MCH 25.3 25.2 - 33.5 pg    MCHC 28.4 28.4 - 34.8 g/dL    RDW 19.0 (H) 11.8 - 14.4 %    Platelets 094 295 - 628 k/uL    MPV 10.4 8.1 - 13.5 fL    NRBC Automated 0.0 0.0 per 100 WBC    RBC Morphology ANISOCYTOSIS PRESENT     Seg Neutrophils 88 (H) 36 - 66 %    Lymphocytes 4 (L) 24 - 44 %    Monocytes 8 (H) 1 - 7 %    Eosinophils % 0 (L) 1 - 4 %    Basophils 0 %    Immature Granulocytes 0 0 %    Segs Absolute 9.24 (H) 1.8 - 7.7 k/uL    Absolute Lymph # 0.42 (L) 1.0 - 4.8 k/uL    Absolute Mono # 0.84 (H) 0.2 - 0.8 k/uL    Absolute Eos # 0.00 0.0 - 0.4 k/uL    Basophils Absolute 0.00 0.0 - 0.2 k/uL    Absolute Immature Granulocyte 0.00 0.00 - 0.30 k/uL    Morphology ACANTHOCYTES    Brain Natriuretic Peptide    Collection Time: 02/28/22  7:20 PM   Result Value Ref Range    Pro-BNP 10,429 (H) <300 pg/mL   Troponin    Collection Time: 02/28/22  7:20 PM   Result Value Ref Range    Troponin, High Sensitivity 101 (HH) 0 - 22 ng/L   Protime-INR    Collection Time: 02/28/22  7:20 PM   Result Value Ref Range    Protime 33.0 (H) 11.5 - 14.2 sec    INR 3.4    Digoxin Level    Collection Time: 02/28/22  7:20 PM   Result Value Ref Range    Digoxin Lvl <0.3 (L) 0.5 - 2.0 ng/mL   Troponin    Collection Time: 02/28/22  9:11 PM   Result Value Ref Range    Troponin, High Sensitivity 98 (HH) 0 - 22 ng/L   Urinalysis with Reflex to Culture    Collection Time: 02/28/22  9:45 PM    Specimen: Urine   Result Value Ref Range    Color, UA Yellow Yellow    Turbidity UA Clear Clear    Glucose, Ur NEGATIVE NEGATIVE    Bilirubin Urine NEGATIVE NEGATIVE    Ketones, Urine NEGATIVE NEGATIVE    Specific Gravity, UA 1.020 1.005 - 1.030    Urine Hgb NEGATIVE NEGATIVE    pH, UA 6.0 5.0 - 8.0    Protein, UA NEGATIVE NEGATIVE Urobilinogen, Urine Normal Normal    Nitrite, Urine NEGATIVE NEGATIVE    Leukocyte Esterase, Urine NEGATIVE NEGATIVE   Troponin    Collection Time: 03/01/22 12:11 AM   Result Value Ref Range    Troponin, High Sensitivity 113 (HH) 0 - 22 ng/L       Imaging/Diagnostics:  No results found. Assessment :      Hospital Problems           Last Modified POA    * (Principal) A-fib ([facility] [address] 2/28/2022 Yes    Hypokalemia 3/1/2022 Yes    Hypothyroidism 3/1/2022 Yes    Overactive bladder 3/1/2022 Yes    Elevated brain natriuretic peptide (BNP) level 3/1/2022 Yes          Plan:     Patient status Inpatient in the  Med/Surge    1. A. fib-urology consult. Telemetry. Continue Cardizem drip. Digoxin is subtherapeutic. Defer to cardiology  2. Elevated BNP-no documented history of CHF. No evidence of CHF on chest x-ray. Continue to monitor for fluid overload  3. Hypokalemia-mild. Trend labs. Replace as needed  4. Overactive bladder-continue Ditropan once verified  5. Hypothyroidism-documented diagnosis in care everywhere but no Synthroid on home medication list.  RN spoke to his /caregiver who will bring in a list of his medications tomorrow morning. 6. DVT prophylaxis    Consultations:   IP CONSULT TO INTERNAL MEDICINE  IP CONSULT TO CARDIOLOGY  IP CONSULT TO CARDIOLOGY  IP CONSULT TO SOCIAL WORK    Patient is admitted as inpatient status because of co-morbidities listed above, severity of signs and symptoms as outlined, requirement for current medical therapies and most importantly because of direct risk to patient if care not provided in a hospital setting. Expected length of stay > 48 hours. ROBERT GERARDO, MISTI - CNP  3/1/2022  5:21 AM    Copy sent to Dr. Cuevas primary care provider on file.

## 2022-03-01 NOTE — CONSULTS
Warfarin Dosing - Pharmacy Consult Note  Consulting Provider: Liliane Lemus  Indication:  Atrial Fibrillation  Warfarin Dose prior to admission: 4 mg daily  Concurrent anticoagulants/antiplatelets: none   Significant Drug Interactions: No obvious interactions  Recent Labs     02/28/22  1920 03/01/22  0808   INR 3.4 3.1   HGB 9.9*  --      --    LABALBU 3.0* 3.0*     Recent warfarin administrations        No warfarin orders with administrations found. Orders not given:            warfarin placeholder: dosing by pharmacy                   Date   INR    Dose  2/28        3.4        ?  3/1          3.1    Assessment/Plan  (Goal INR: 2 - 3)  Inr is dropping , still supratherapeutic. No coumadin today. Inr in am.     Active problem list reviewed. INR orders are placed. Chart reviewed for pertinent labs, drug/diet interactions, and past doses. Documentation of patient's clinical condition was reviewed. Pharmacy Dosing:  Pharmacy will continue to follow.

## 2022-03-01 NOTE — ED NOTES
Pt given lunch box and milk     [facility] [address]  02/28/22 5042

## 2022-03-01 NOTE — PROGRESS NOTES
Comprehensive Nutrition Assessment    Type and Reason for Visit:  Initial,Positive Nutrition Screen (2-13 lb weight loss, decrease appetite, malnutrition score:2 ; dietitian consult: poor intake)    Nutrition Recommendations/Plan:   1. Continue current Adult Diet; Regular; 4 carb choices (60 gm/meal); No caffeine  2. Monitor po intakes and labs; declined ONS  3. Suggest obtaining accurate wts to better assess for weight changes/malnutrition    Nutrition Assessment:  Pt admission r/t weakness, dehydration and A-fib. Patient seen with caregiver/ friend in room- pt reports being unable to walk due to weakness and needing nutrition/fluids. Pt reports not needing special diet and ordering what he would like (potatoes, jello, pudding). Patient does not know exactly how much weight he has lost. EHR showing 13# x 3 months however, weight methods are stated/estimated. Pt reports not drinking or wanting ONS - will get his nutrients from his meals. Pt reports waiting for/needing PT/OT. Will continue current diet and monitor po intakes. Malnutrition Assessment:  Malnutrition Status:  Insufficient data    Context:  Acute Illness     Findings of the 6 clinical characteristics of malnutrition:  Energy Intake:  Mild decrease in energy intake (Comment)  Weight Loss:  7 - Greater than 7.5% over 3 months (stated/estimated weights)     Body Fat Loss:  Unable to assess     Muscle Mass Loss:  1 - Mild muscle mass loss Temples (temporalis)  Fluid Accumulation:  1 - Mild Extremities   Strength:  Not Performed    Estimated Daily Nutrient Needs:  Energy (kcal):  0484-7279 kcal (30-32 kcal/kg); Weight Used for Energy Requirements:  Current     Protein (g):  80-90 gm protein (1.5-1.7 g/kg); Weight Used for Protein Requirements:  Current          Nutrition Related Findings:  GI: WDL. Edema: BUE/BLE +1 pitting. Dehydration. Wounds:  None       Current Nutrition Therapies:    ADULT DIET; Regular; 4 carb choices (60 gm/meal);  No Caffeine    Anthropometric Measures:  · Height: 5' 4\" (162.6 cm)  · Current Body Weight: 120 lb (54.4 kg)   · Admission Body Weight: 120 lb (54.4 kg)    · Usual Body Weight: 133 lb (60.3 kg) (stated weight on 12/7/21)     · Ideal Body Weight: 130 lbs; % Ideal Body Weight 92.3 %   · BMI: 20.6  · Adjusted Body Weight:  ; No Adjustment    · BMI Categories: Underweight (BMI less than 22) age over 72       Nutrition Diagnosis:   · Inadequate protein-energy intake related to inadequate protein-energy intake as evidenced by poor intake prior to admission,weight loss,weight loss 7.5% in 3 months    Nutrition Interventions:   Food and/or Nutrient Delivery:  Continue Current Diet  Nutrition Education/Counseling:  Education not indicated   Coordination of Nutrition Care:  Continue to monitor while inpatient    Goals:  PO intakes to provide >75% of estimated nutritional needs       Nutrition Monitoring and Evaluation:   Behavioral-Environmental Outcomes:  None Identified   Food/Nutrient Intake Outcomes:  Food and Nutrient Intake  Physical Signs/Symptoms Outcomes:  Biochemical Data,Skin,Weight     Discharge Planning:    Continue current diet     Lara Rankin, [address],   Office Number: [phone]

## 2022-03-01 NOTE — PROGRESS NOTES
Pt refusing lab draws at this time. And he is refusing his morning lab draws as well. .   will attempt to reschedule for 8AM

## 2022-03-01 NOTE — CARE COORDINATION
Case Management Initial Discharge Yvan Lambert,             Met with:patient or friend to discuss discharge plans. Information verified: address, contacts, phone number, , insurance Yes  Insurance Provider: medicare and [place]    Emergency Contact/Next of Kin name & number: Shantel/friend and employer     Who are involved in patient's support system? Pily Grimes    PCP: No primary care provider on file. Date of last visit: Dr. Sagastume Median- unknown      Discharge Planning    Living Arrangements:  Jak Stanley (Comment) (has aid)     Home has 1 stories  2 stairs to climb to get into front door, 0stairs to climb to reach second floor  Location of bedroom/bathroom in home main    Patient able to perform ADL's:Independent    Current Services (outpatient & in home) none  DME equipment: 0  DME provider: 0    Is patient receiving oral anticoagulation therapy? No    If indicated:   Physician managing anticoagulation treatment:   Where does patient obtain lab work for ATC treatment? Potential Assistance Needed:  [address]    Patient agreeable to home care: No  [place] of choice provided:  n/a    Prior SNF/Rehab Placement and Facility: n/a  Agreeable to SNF/Rehab: No  [place] of choice provided: n/a     Evaluation: no    Expected Discharge date:  22    Patient expects to be discharged to: If home: is the family and/or caregiver wiling & able to provide support at home? yes  Who will be providing this support? friend    Follow Up Appointment: Best Day/ Time: Wednesday AM    Transportation provider: friend  Transportation arrangements needed for discharge: No    Readmission Risk              Risk of Unplanned Readmission:  14             Does patient have a readmission risk score greater than 14?: No  If yes, follow-up appointment must be made within 7 days of discharge.      Goals of Care:       Educated patient and Pily Grimes on transitional options, provided freedom of choice and are agreeable with plan      Discharge Plan: A-fib. Patient lives alone in a 1 story home with 2 steps to enter. Has an employer and friend, Juan Samson, that helps him. Does not use any DME at this time. Pharmacy is [facility]. Patient wants PT/OT to evaluate to determine his needs. Patient states that he cannot walk. He is weak. Writer will speak with patient after PT evaluates. Patient is reluctant to go to SNF or to have [facility].. Continue to follow.            Electronically signed by Ailyn Suarez RN on 3/1/22 at 12:18 PM EST

## 2022-03-01 NOTE — FLOWSHEET NOTE
Patient is awake and alert while sitting up in the bed. Patient's caregiver is bedside. Patient is approachable and reports that he is a [place]. Patient crosses himself and recites, \"in the name of the Father, and of the Son, and of the [place]. Patient requests a prayer and writer provides a prayer for healing, peace, and rest. Patient expresses appreciation for the visit. Spiritual care will follow as needed.        03/01/22 1130   Encounter Summary   Services provided to: Patient and family together   Referral/Consult From: [facility]  (Caregiver)   Continue Visiting   (3/1/22)   Complexity of Encounter Moderate   Length of Encounter 15 minutes   Spiritual Assessment Completed Yes   Routine   Type Initial   Assessment Approachable   Intervention Active listening;Explored coping resources;Nurtured hope;Prayer   Outcome Expressed gratitude

## 2022-03-01 NOTE — PROGRESS NOTES
Attempted to call PT aid/caregiver Filippo Villarreal for medication list and update.   Unable to reach at this time, will attempt to contact early in the AM.

## 2022-03-01 NOTE — ED NOTES
Pt to ED via EMS. Pt c/o dehydration, fatigue, and weakness. Upon arrival pt is in Afib RVR, HR in the 130's-160's. Pt A&Ox4. Pt arrives sitting in a large amount of soft green stool. Pt cleaned up, breakdown noted in mulu area, cream applied, and brief applied to pt. Pt denies chest pain or SOB.        Mouna Duenas, [place]  02/28/22 1160

## 2022-03-01 NOTE — FLOWSHEET NOTE
Follow up from chaplain Rizvi about patient's belief system. Patient states friends with fr. Charlotte Blanc. Shared a bit about his medical situation. States no major needs. Open to prayers.  shared in presence, listening, prayers. Follow up as needed. 03/01/22 1812   Encounter Summary   Services provided to: Patient   Referral/Consult From: Other    Continue Visiting   (3-1-22)   Complexity of Encounter Low   Length of Encounter 15 minutes   Spiritual Assessment Completed Yes   Routine   Type Follow up   Assessment Calm; Approachable   Intervention Explored feelings, thoughts, concerns; Active listening;Prayer;Discussed illness/injury and it's impact; Discussed belief system/Catholic practices/florian   Outcome Expressed gratitude;Receptive;Engaged in conversation;Expressed feelings/needs/concerns

## 2022-03-01 NOTE — PROGRESS NOTES
Pt has stated that the only visitor that he will allow is his aid/caregiver Aidan Cortez. Pt also states that he does not want information other than he is stable shared with his daughter Annabel Dick if she calls. .    This RN has spoken with Annabel Dick this evening already and shared this information with her as well.   She currently lives in [place]

## 2022-03-01 NOTE — PROGRESS NOTES
Call to NP for pt BP starting to run low and to have diltiazem parameters placed BP and Titration and HR.   Lowered Pt drip by

## 2022-03-01 NOTE — PROGRESS NOTES
Physical Therapy  DATE: 3/1/2022    NAME: Clair Nobles Dr Clare Echols  MRN: [id number]   : 1931    Patient not seen this date for Physical Therapy due to:      [] Cancel by RN or physician due to:    [] Hemodialysis    [] Critical Lab Value Level     [] Blood transfusion in progress    [] Acute or unstable cardiovascular status   _MAP < 55 or more than >115  _HR < 40 or > 130    [] Acute or unstable pulmonary status   -FiO2 > 60%   _RR < 5 or >40    _O2 sats < 85%    [] Strict Bedrest    [] Off Unit for surgery or procedure    [] Off Unit for testing       [] Pending imaging to R/O fracture    [x] Refusal by Patient  Attempted eval; attempted up to chair; attempted just bed exercises -> pt declining all today. Reports \"come see me in a couple of days\". [] Other      [] PT being discontinued at this time. Patient independent. No further needs. [] PT being discontinued at this time as the patient has been transferred to hospice care. No further needs.       Giancarlo Raphael, PT

## 2022-03-01 NOTE — ED PROVIDER NOTES
EMERGENCY DEPARTMENT ENCOUNTER    Pt Name: Unique Montilla  MRN: [id number]  [place] 11/24/1931  Date of evaluation: 2/28/22  CHIEF COMPLAINT       Chief Complaint   Patient presents with    Atrial Fibrillation    Fatigue    Dehydration     HISTORY OF PRESENT ILLNESS   Patient is a 27-year-old male with PMH of atrial fibrillation on warfarin, digoxin, diltiazem, who is brought in by ambulance for weakness. Patient states feeling dehydrated and weak for the past 3 to 4 days. No other complaints at this time. No chest pain, shortness of breath, abdominal pain, nausea, vomiting, changes in urine or stool. REVIEW OF SYSTEMS     Review of Systems   All other systems reviewed and are negative. PASTMEDICAL HISTORY     Past Medical History:   Diagnosis Date    Anemia     Atrial fibrillation (HCC)     Atrophy of testis     Diabetes mellitus ([facility] [facility]     Hyperlipidemia     Hypothyroidism     Osteoporosis     Overactive bladder     Palpitations     Prostate cancer ([facility] [address] [facility]     Vitamin D deficiency     Weight loss      SURGICAL HISTORY     History reviewed. No pertinent surgical history. CURRENT MEDICATIONS       Previous Medications    ASPIRIN 81 MG EC TABLET    Take 81 mg by mouth daily    DIGOXIN (LANOXIN) 125 MCG TABLET    Take 125 mcg by mouth daily     DILTIAZEM (CARDIZEM) 30 MG TABLET    Take 30 mg by mouth every 8 hours     FUROSEMIDE (LASIX) 20 MG TABLET    Take 20 mg by mouth daily     OXYBUTYNIN (DITROPAN) 5 MG TABLET    Take 5 mg by mouth daily     WARFARIN (COUMADIN) 4 MG TABLET    Take 4 mg by mouth daily     ALLERGIES     is allergic to sulfa antibiotics. FAMILY HISTORY     has no family status information on file.       SOCIAL HISTORY       Social History     Tobacco Use    Smoking status: Never Smoker    Smokeless tobacco: Never Used   Substance Use Topics    Alcohol use: Never    Drug use: Never     PHYSICAL EXAM     INITIAL VITALS: /82   Pulse 132   Temp 98.7 °F (37.1 °C) (Oral)   Resp 15   Ht 5' 4\" (1.626 m)   Wt 120 lb (54.4 kg)   SpO2 94%   BMI 20.60 kg/m²    Physical Exam  HENT:      Head: Normocephalic. Right Ear: External ear normal.      Left Ear: External ear normal.      Nose: Nose normal.      Mouth/Throat:      Mouth: Mucous membranes are dry. Eyes:      Conjunctiva/sclera: Conjunctivae normal.   Cardiovascular:      Rate and Rhythm: Tachycardia present. Rhythm irregular. Pulmonary:      Effort: Pulmonary effort is normal. No respiratory distress. Breath sounds: No wheezing or rales. Abdominal:      General: Abdomen is flat. Tenderness: There is no abdominal tenderness. There is no guarding. Skin:     General: Skin is dry. Neurological:      Mental Status: He is alert. Mental status is at baseline. Psychiatric:         Mood and Affect: Mood normal.         Behavior: Behavior normal.         MEDICAL DECISION MAKING:   The patient is tachycardic with irregular heartbeat, afebrile, nontoxic-appearing. Physical exam notable for dry mucous membranes  Based on history and exam likely poor hydration triggering atrial fibrillation. Also consider infectious process in urine or chest.  ED plan for basic labs, fluids, chest x-ray, EKG, troponin, BNP, reassess. DIAGNOSTIC RESULTS   EKG:All EKG's are interpreted by the Emergency Department Physician who either signs or Co-signs this chart in the absence of a cardiologist.        RADIOLOGY:All plain film, CT, MRI, and formal ultrasound images (except ED bedside ultrasound) are read by the radiologist, see reports below, unless otherwisenoted in MDM or here. XR CHEST PORTABLE    (Results Pending)     LABS: All lab results were reviewed by myself, and all abnormals are listed below.   Labs Reviewed   COMPREHENSIVE METABOLIC PANEL W/ REFLEX TO MG FOR LOW K - Abnormal; Notable for the following components:       Result Value    Glucose 124 (*)     BUN 65 (*)     Bun/Cre Ratio 63 (*)     Potassium 3.6 (*)     ALT 59 (*)      (*)     Total Protein 5.8 (*)     Albumin 3.0 (*)     All other components within normal limits   CBC WITH AUTO DIFFERENTIAL - Abnormal; Notable for the following components:    RBC 3.91 (*)     Hemoglobin 9.9 (*)     Hematocrit 34.9 (*)     RDW 19.0 (*)     Seg Neutrophils 88 (*)     Lymphocytes 4 (*)     Monocytes 8 (*)     Eosinophils % 0 (*)     Segs Absolute 9.24 (*)     Absolute Lymph # 0.42 (*)     Absolute Mono # 0.84 (*)     All other components within normal limits   BRAIN NATRIURETIC PEPTIDE - Abnormal; Notable for the following components:    Pro-BNP 10,429 (*)     All other components within normal limits   TROPONIN - Abnormal; Notable for the following components:    Troponin, High Sensitivity 101 (*)     All other components within normal limits   PROTIME-INR - Abnormal; Notable for the following components:    Protime 33.0 (*)     All other components within normal limits   DIGOXIN LEVEL - Abnormal; Notable for the following components:    Digoxin Lvl <0.3 (*)     All other components within normal limits   TROPONIN   URINALYSIS WITH REFLEX TO CULTURE       EMERGENCY DEPARTMENTCOURSE:   Patient remained stable in the ED. Labs:  Potassium 3.6  Creatinine 1.04  BNP 10,429  Troponin 101   WBC 10.5  INR 3.4  Dig level <0.3  Tachycardia did not improve with the fluids. Given Cardizem 10 mg IV with moderate improvement heart rate decreasing to the 110s to 120s. I ordered Cardizem drip.     Discussed case with Ayden Pratt, who accepts patient for admission to hospital.    Discussed case with cardiology, Paula Parsons, who will consult patient in the hospital.      Vitals:    Vitals:    02/1934 02/28/22 2000 02/28/22 2015 02/28/22 2030   BP:  (!) 138/98 (!) 127/99 117/82   Pulse: 155 149 154 132   Resp: 16 18 16 15   Temp:       TempSrc:       SpO2: 96%  97% 94%   Weight:       Height:           The patient was given the following medications while in the emergency department:  Orders Placed This Encounter   Medications    0.9 % sodium chloride bolus    0.9 % sodium chloride bolus    dilTIAZem injection 10 mg    dilTIAZem 125 mg in dextrose 5 % 125 mL infusion     Order Specific Question:   Titrate Infusion? Answer:   No     Order Specific Question:   Infusion Dose: Answer:   2.5 mg/hr    furosemide (LASIX) injection 20 mg     CONSULTS:  IP CONSULT TO INTERNAL MEDICINE  IP CONSULT TO CARDIOLOGY    FINAL IMPRESSION      1. General weakness    2. Atrial fibrillation, unspecified type ([facility] [address]          DISPOSITION/PLAN   DISPOSITION        PATIENT REFERRED TO:  No follow-up provider specified.   DISCHARGE MEDICATIONS:  New Prescriptions    No medications on file     Radha Thomas MD  Attending Emergency Physician                   Marlen Butts MD  02/28/22 8865

## 2022-03-01 NOTE — PROGRESS NOTES
[facility]  Office: [address], DO, Ashley Brissa, DO, Susan Stallingsfransisco, DO, Reid Jama Blood, DO, Puam Padgett MD, Cliff Rivera MD, Monae Ricks MD, Ursula Morelos MD, Percy Jorge MD, Tatum Oliveira MD, Joon Key MD, Joellen Vasquez, DO, Para [place], DO, Terrie Harris MD,  Ida Stafford, DO, Don Albert MD, Adelia Zimmerman MD, Shawna Driscoll MD, Elkin Stone MD, Amairani Saunders MD, Pricilla Fothergill, [facility], [facility], [facility], Luther Yancey, CNP, Sue Liu, CNS, Roseann Hampton, CNP, Myles Anna, CNP, Aleksandr Bourgeois, CNP, Perico Knutson, CNP, Javed Jean Baptiste, CNP, Gunner Granados PA-C, Karishma García, [facility], Beth Jarvis, [facility], Jung Green, CNP, Bela Garcia, CNP, Gilles Breen, CNP, Ruslan Stewart, CNP, Ian Olson, CNP, [facility]    Progress Note    3/1/2022    4:29 PM    Name:   Lindsay Mcdaniel  MRN:     [id number]     Acct:      [de-identified]   Room:   2042/204201   Day:  1  Admit Date:  2/28/2022  7:09 PM    PCP:   No primary care provider on file. Code Status:  Full Code    Subjective:     C/C:   Chief Complaint   Patient presents with    Atrial Fibrillation    Fatigue    Dehydration     Interval History Status: improved. Patient continues on Cardizem  Will switch to oral  Small iv fluid bolus  No chest pain and sob    Brief History:     77-year-old male with known medical history of A. fib on Coumadin, noncompliant to digoxin and Cardizem at home presented weak and having poor appetite. Patient requested fluid hydration in the ER. Patient was noted to be in A. fib with RVR. Cardizem drip was started. Digoxin level was low.     Review of Systems:     Constitutional:  negative for chills, fevers, sweats  Respiratory:  negative for cough, dyspnea on exertion, shortness of breath, wheezing  Cardiovascular:  negative for chest pain, chest pressure/discomfort, lower extremity edema, palpitations  Gastrointestinal:  negative for abdominal pain, constipation, diarrhea, nausea, vomiting  Neurological:  negative for dizziness, headache    Medications: Allergies: Allergies   Allergen Reactions    Sulfa Antibiotics        Current Meds:   Scheduled Meds:    dilTIAZem  120 mg Oral Daily    [START ON 3/2/2022] warfarin placeholder: dosing by pharmacy   Other RX Placeholder    sodium chloride flush  5-40 mL IntraVENous 2 times per day     Continuous Infusions:    dilTIAZem (CARDIZEM) 125 mg in dextrose 5% 125 mL infusion 6 mg/hr (22 1415)    sodium chloride      dextrose       PRN Meds: sodium chloride flush, sodium chloride, ondansetron **OR** ondansetron, polyethylene glycol, acetaminophen **OR** acetaminophen, glucose, glucagon (rDNA), dextrose, perflutren lipid microspheres, dextrose bolus (hypoglycemia) **OR** dextrose bolus (hypoglycemia)    Data:     Past Medical History:   has a past medical history of Anemia, Atrial fibrillation ([address] Atrophy of testis, Hyperlipidemia, Hypothyroidism, Osteoporosis, Overactive bladder, Palpitations, Prostate cancer ([facility] [address] Vitamin D deficiency, and Weight loss. Social History:   reports that he has never smoked. He has never used smokeless tobacco. He reports that he does not drink alcohol and does not use drugs. Family History: History reviewed. No pertinent family history. Vitals:  /67   Pulse 103   Temp 97.7 °F (36.5 °C) (Temporal)   Resp 19   Ht 5' 4\" (1.626 m)   Wt 120 lb (54.4 kg)   SpO2 (!) 89%   BMI 20.60 kg/m²   Temp (24hrs), Av °F (36.7 °C), Min:97.7 °F (36.5 °C), Max:98.7 °F (37.1 °C)    No results for input(s): POCGLU in the last 72 hours. I/O (24Hr):     Intake/Output Summary (Last 24 hours) at 3/1/2022 1629  Last data filed at 3/1/2022 0323  Gross per 24 hour   Intake 387 ml   Output 550 ml   Net -163 ml       Labs:  Hematology:  Recent Labs     22  1920 22  0808   WBC 10.5  --    RBC 3.91*  --    HGB 9.9*  --    HCT 34.9*  --    MCV 89.3  --    MCH 25.3  --    MCHC 28.4  --    RDW 19.0*  --      --    MPV 10.4  --    INR 3.4 3.1     Chemistry:  Recent Labs     02/28/22 1920 02/28/22 1920 02/28/22 2111 03/01/22 0011 03/01/22  0808     --   --   --  143   K 3.6*  --   --   --  3.3*     --   --   --  104   CO2 27  --   --   --  29   GLUCOSE 124*  --   --   --  130*   BUN 65*  --   --   --  59*   CREATININE 1.04  --   --   --  0.96   MG  --   --   --   --  2.5   ANIONGAP 11  --   --   --  10   LABGLOM >60  --   --   --  >60   GFRAA >60  --   --   --  >60   CALCIUM 8.6  --   --   --  9.2   PROBNP 10,429*  --   --   --  8,900*   TROPHS 101*   < > 98* 113* 100*   DIGOXIN <0.3*  --   --   --   --     < > = values in this interval not displayed. Recent Labs     02/28/22 1920 03/01/22  0808   PROT 5.8* 5.5*   LABALBU 3.0* 3.0*   TSH  --  3.86   * 81*   ALT 59* 53*   ALKPHOS 100 98   BILITOT 0.96 0.79     ABG:No results found for: POCPH, PHART, PH, POCPCO2, OZI3YLM, PCO2, POCPO2, PO2ART, PO2, POCHCO3, LXQ7HOY, HCO3, NBEA, PBEA, BEART, BE, THGBART, THB, GBM9UVC, EULY4BDQ, M3ZCMBWJ, O2SAT, FIO2  No results found for: SPECIAL  No results found for: CULTURE    Radiology:  XR CHEST PORTABLE    Result Date: 2/28/2022  1. Cardiomegaly, without evidence of CHF 2.  Small right pleural effusion and right basilar atelectasis       Physical Examination:        General appearance:  alert, cooperative and no distress, dry mucous membrane  Mental Status:  oriented to person, place and time and normal affect  Lungs:  clear to auscultation bilaterally, normal effort  Heart:  regular rate and rhythm, no murmur  Abdomen:  soft, nontender, nondistended, normal bowel sounds, no masses, hepatomegaly, splenomegaly  Extremities:  1+ edema, redness, tenderness in the calves  Skin:  no gross lesions, rashes, induration    Assessment:        Hospital Problems           Last Modified POA    * (Principal) A-fib ([facility] [address] 2/28/2022 Yes    Hypokalemia 3/1/2022 Yes    Hypothyroidism 3/1/2022 Yes    Overactive bladder 3/1/2022 Yes    Elevated brain natriuretic peptide (BNP) level 3/1/2022 Yes          Plan:        Start Cardizem oral, wean off IV drip  Pharmacy to dose Coumadin  Patient refused cardiology evaluation  Hold lasix  Leg elevation and compression stockings if patient agrees  Medication list reviewed with the patient, he does not take most of the medications.   PT and OT  Patient requesting inpatient rehab, PM&R consulted    Shubham Briscoe MD  3/1/2022  4:29 PM

## 2022-03-02 LAB
ANION GAP SERPL CALCULATED.3IONS-SCNC: 10 MMOL/L (ref 9–17)
BUN BLDV-MCNC: 52 MG/DL (ref 8–23)
BUN/CREAT BLD: 58 (ref 9–20)
CALCIUM SERPL-MCNC: 8.4 MG/DL (ref 8.6–10.4)
CHLORIDE BLD-SCNC: 102 MMOL/L (ref 98–107)
CO2: 26 MMOL/L (ref 20–31)
CREAT SERPL-MCNC: 0.9 MG/DL (ref 0.7–1.2)
GFR AFRICAN AMERICAN: >60 ML/MIN
GFR NON-AFRICAN AMERICAN: >60 ML/MIN
GFR SERPL CREATININE-BSD FRML MDRD: ABNORMAL ML/MIN/{1.73_M2}
GLUCOSE BLD-MCNC: 162 MG/DL (ref 70–99)
INR BLD: 3.4
POTASSIUM SERPL-SCNC: 4.2 MMOL/L (ref 3.7–5.3)
PROTHROMBIN TIME: 33 SEC (ref 11.5–14.2)
SODIUM BLD-SCNC: 138 MMOL/L (ref 135–144)

## 2022-03-02 PROCEDURE — 97116 GAIT TRAINING THERAPY: CPT

## 2022-03-02 PROCEDURE — 2580000003 HC RX 258: Performed by: NURSE PRACTITIONER

## 2022-03-02 PROCEDURE — 36415 COLL VENOUS BLD VENIPUNCTURE: CPT

## 2022-03-02 PROCEDURE — 6370000000 HC RX 637 (ALT 250 FOR IP): Performed by: STUDENT IN AN ORGANIZED HEALTH CARE EDUCATION/TRAINING PROGRAM

## 2022-03-02 PROCEDURE — 97162 PT EVAL MOD COMPLEX 30 MIN: CPT

## 2022-03-02 PROCEDURE — 2500000003 HC RX 250 WO HCPCS: Performed by: INTERNAL MEDICINE

## 2022-03-02 PROCEDURE — 97167 OT EVAL HIGH COMPLEX 60 MIN: CPT

## 2022-03-02 PROCEDURE — 99222 1ST HOSP IP/OBS MODERATE 55: CPT | Performed by: PHYSICAL MEDICINE & REHABILITATION

## 2022-03-02 PROCEDURE — 97535 SELF CARE MNGMENT TRAINING: CPT

## 2022-03-02 PROCEDURE — 80048 BASIC METABOLIC PNL TOTAL CA: CPT

## 2022-03-02 PROCEDURE — 2060000000 HC ICU INTERMEDIATE R&B

## 2022-03-02 PROCEDURE — 99232 SBSQ HOSP IP/OBS MODERATE 35: CPT | Performed by: STUDENT IN AN ORGANIZED HEALTH CARE EDUCATION/TRAINING PROGRAM

## 2022-03-02 PROCEDURE — 97530 THERAPEUTIC ACTIVITIES: CPT

## 2022-03-02 PROCEDURE — 85610 PROTHROMBIN TIME: CPT

## 2022-03-02 RX ORDER — 0.9 % SODIUM CHLORIDE 0.9 %
500 INTRAVENOUS SOLUTION INTRAVENOUS ONCE
Status: COMPLETED | OUTPATIENT
Start: 2022-03-02 | End: 2022-03-02

## 2022-03-02 RX ORDER — DILTIAZEM HYDROCHLORIDE 180 MG/1
180 CAPSULE, COATED, EXTENDED RELEASE ORAL DAILY
Status: DISCONTINUED | OUTPATIENT
Start: 2022-03-02 | End: 2022-03-03

## 2022-03-02 RX ORDER — METOPROLOL TARTRATE 5 MG/5ML
5 INJECTION INTRAVENOUS ONCE
Status: COMPLETED | OUTPATIENT
Start: 2022-03-02 | End: 2022-03-02

## 2022-03-02 RX ADMIN — DILTIAZEM HYDROCHLORIDE 180 MG: 180 CAPSULE, COATED, EXTENDED RELEASE ORAL at 09:12

## 2022-03-02 RX ADMIN — SODIUM CHLORIDE 500 ML: 9 INJECTION, SOLUTION INTRAVENOUS at 02:55

## 2022-03-02 RX ADMIN — SODIUM CHLORIDE, PRESERVATIVE FREE 10 ML: 5 INJECTION INTRAVENOUS at 21:00

## 2022-03-02 RX ADMIN — METOPROLOL TARTRATE 5 MG: 5 INJECTION INTRAVENOUS at 03:18

## 2022-03-02 RX ADMIN — SODIUM CHLORIDE, PRESERVATIVE FREE 10 ML: 5 INJECTION INTRAVENOUS at 08:55

## 2022-03-02 ASSESSMENT — PAIN SCALES - GENERAL
PAINLEVEL_OUTOF10: 0

## 2022-03-02 NOTE — FLOWSHEET NOTE
Patient sleeping; receives [facility] the [address] (anointing) from [facility].    [facility] will follow as needed. [facility]     88/04/41 3724   Encounter Summary   Services provided to: Patient   Referral/Consult From: Rounding   Continue Visiting   (3/2/22 sleeping/anointed)   Complexity of Encounter Low   Length of Encounter 15 minutes   Routine   Type Follow up   [address]-Anointing Anointed  (3/2/22 Fr. Caro Alarcon)

## 2022-03-02 NOTE — PROGRESS NOTES
[facility]  Office: [address], DO, Lizzy Pollock, DO, Ori Bueno, DO, Dom Queen, DO, Reuben Nix MD, Irlanda Catalan MD, Peña Harden MD, Mira Vasquez MD, Lanre Keenan MD, Tracey Steiner MD, Maura Block MD, Cristóbal Guerra, DO, Joao Rosen, DO, July Mcknight MD,  Xiomara Keane, DO, Emy Baird MD, Oza Hatchet, MD, Jeff Stahl MD, Francheska Holcomb MD, Micki Yao MD, Zelda Schirmer, [facility], [facility], CNP, Elo Covarrubias, CNP, Marcus Herrmann, CNS, Riri Thomas, CNP, Kash Weller, CNP, Vincenzo Porter, CNP, Driss Brown, CNP, Mahsa Tirado, CNP, Sean Nguyen PA-C, Lesly Hooker, [facility], [facility], [facility], Maggi Mcmanus, CNP, Lauren Akers, CNP, Yogi Urias, CNP, Davy Garcia, CNP, Damian Figueroa, CNP, Carson Goltz, [facility]    Progress Note    3/2/2022    1:16 PM    Name:   Codey Pavon March  MRN:     [id number]     Acct:      [de-identified]   Room:   2042/204201   Day:  2  Admit Date:  2/28/2022  7:09 PM    PCP:   No primary care provider on file. Code Status:  Full Code    Subjective:     C/C:   Chief Complaint   Patient presents with    Atrial Fibrillation    Fatigue    Dehydration     Interval History Status: no change    Patient initially refused to take his Cardizem this morning, after counselling did end up taking the dose  Refused physical therapy yesterday  Feels better  Hr still fluctuates to 150 on exertion    Brief History:     72-year-old male with known medical history of A. fib on Coumadin, noncompliant to digoxin and Cardizem at home presented with weakness and having poor appetite. Patient requested fluid hydration in the ER. Patient was noted to be in A. fib with RVR. Cardizem drip was started. Digoxin level was low.     Review of Systems:     Constitutional:  negative for chills, fevers, sweats, + fatigue  Respiratory:  negative for cough, dyspnea on exertion, shortness of breath, wheezing  Cardiovascular:  negative for chest pain, chest pressure/discomfort, lower extremity edema, palpitations  Gastrointestinal:  negative for abdominal pain, constipation, diarrhea, nausea, vomiting  Neurological:  negative for dizziness, headache    Medications: Allergies: Allergies   Allergen Reactions    Sulfa Antibiotics        Current Meds:   Scheduled Meds:    dilTIAZem  180 mg Oral Daily    warfarin placeholder: dosing by pharmacy   Other RX Placeholder    sodium chloride flush  5-40 mL IntraVENous 2 times per day     Continuous Infusions:    sodium chloride      dextrose       PRN Meds: sodium chloride flush, sodium chloride, ondansetron **OR** ondansetron, polyethylene glycol, acetaminophen **OR** acetaminophen, glucose, glucagon (rDNA), dextrose, perflutren lipid microspheres, dextrose bolus (hypoglycemia) **OR** dextrose bolus (hypoglycemia)    Data:     Past Medical History:   has a past medical history of Anemia, Atrial fibrillation ([facility] [address] Atrophy of testis, Hyperlipidemia, Hypothyroidism, Osteoporosis, Overactive bladder, Palpitations, Prostate cancer ([facility] [address] Vitamin D deficiency, and Weight loss. Social History:   reports that he has never smoked. He has never used smokeless tobacco. He reports that he does not drink alcohol and does not use drugs. Family History: History reviewed. No pertinent family history. Vitals:  /79   Pulse 110   Temp 97.4 °F (36.3 °C) (Temporal)   Resp 16   Ht 5' 4\" (1.626 m)   Wt 152 lb (68.9 kg)   SpO2 93%   BMI 26.09 kg/m²   Temp (24hrs), Av.7 °F (36.5 °C), Min:97.4 °F (36.3 °C), Max:98.2 °F (36.8 °C)    No results for input(s): POCGLU in the last 72 hours. I/O (24Hr):     Intake/Output Summary (Last 24 hours) at 3/2/2022 1316  Last data filed at 3/2/2022 0855  Gross per 24 hour   Intake 1636.72 ml   Output 475 ml   Net 1161.72 ml       Labs:  Hematology:  Recent Labs     22  1920 22  2309 03/02/22 0331   WBC 10.5  --   --    RBC 3.91*  --   --    HGB 9.9*  --   --    HCT 34.9*  --   --    MCV 89.3  --   --    MCH 25.3  --   --    MCHC 28.4  --   --    RDW 19.0*  --   --      --   --    MPV 10.4  --   --    INR 3.4 3.1 3.4     Chemistry:  Recent Labs     02/28/22 1920 02/28/22 1920 02/28/22 2111 03/01/22 0011 03/01/22  0808 03/02/22 0331     --   --   --  143 138   K 3.6*  --   --   --  3.3* 4.2     --   --   --  104 102   CO2 27  --   --   --  29 26   GLUCOSE 124*  --   --   --  130* 162*   BUN 65*  --   --   --  59* 52*   CREATININE 1.04  --   --   --  0.96 0.90   MG  --   --   --   --  2.5  --    ANIONGAP 11  --   --   --  10 10   LABGLOM >60  --   --   --  >60 >60   GFRAA >60  --   --   --  >60 >60   CALCIUM 8.6  --   --   --  9.2 8.4*   PROBNP 10,429*  --   --   --  8,900*  --    TROPHS 101*   < > 98* 113* 100*  --    DIGOXIN <0.3*  --   --   --   --   --     < > = values in this interval not displayed. Recent Labs     02/28/22 1920 03/01/22  0808   PROT 5.8* 5.5*   LABALBU 3.0* 3.0*   TSH  --  3.86   * 81*   ALT 59* 53*   ALKPHOS 100 98   BILITOT 0.96 0.79     ABG:No results found for: POCPH, PHART, PH, POCPCO2, IDP5XHI, PCO2, POCPO2, PO2ART, PO2, POCHCO3, VJZ8QTD, HCO3, NBEA, PBEA, BEART, BE, THGBART, THB, TFT1DRO, PMZM8TJZ, E7YJEWRV, O2SAT, FIO2  No results found for: SPECIAL  No results found for: CULTURE    Radiology:  XR CHEST PORTABLE    Result Date: 2/28/2022  1. Cardiomegaly, without evidence of CHF 2.  Small right pleural effusion and right basilar atelectasis       Physical Examination:        General appearance:  alert, cooperative and no distress, dry mucous membrane  Mental Status:  oriented to person, place and time and normal affect  Lungs:  clear to auscultation bilaterally, normal effort  Heart:  regular rate and rhythm, no murmur  Abdomen:  soft, nontender, nondistended, normal bowel sounds, no masses, hepatomegaly, splenomegaly  Extremities: 1+ edema, redness, tenderness in the calves  Skin:  no gross lesions, rashes, induration    Assessment:        Hospital Problems           Last Modified POA    * (Principal) Atrial fibrillation with RVR ([address] 3/1/2022 Yes    Hypokalemia 3/1/2022 Yes    Hypothyroidism 3/1/2022 Yes    Overactive bladder 3/1/2022 Yes    Elevated brain natriuretic peptide (BNP) level 3/1/2022 Yes    General weakness 3/1/2022 Yes          Plan:        continue cardizem oral  Pharmacy to dose Coumadin  Patient refused cardiology evaluation  continue to hold lasix   Leg elevation and compression stockings if patient agrees  Medication list reviewed with the patient, he does not take most of the medications.   PT and OT  Patient refuses most tretaments    James Lopez MD  3/2/2022  1:16 PM

## 2022-03-02 NOTE — PROGRESS NOTES
Physician Progress Note      Juanita Gaitan  [facility] #:                  [id number]  :                       1931  ADMIT DATE:       2022 7:09 PM  [address] [place] DATE:  RESPONDING  PROVIDER #:        Cipriano Mayfield MD          QUERY TEXT:    Patient admitted with weakness , possible dehydration, noted to have atrial   fibrillation. If possible, please document in progress notes and discharge   summary further specificity regarding the type of atrial fibrillation: The medical record reflects the following:  Risk Factors: A fib, dehydration  Clinical Indicators: per ED notes:  PMH of atrial fibrillation on warfarin,   digoxin, diltiazem, digoxin level <0.3. pro bnp 10,429  Treatment: Cardizem drip, cardiology consulted- pt declined , telemetry   monitored    Chronic: nonspecific term that could be referring to paroxysmal, persistent,   or permanent  Longstanding persistent: persistent and continuous, lasting > 1 year. Paroxysmal - self-terminating or intermittent; resolves with or without   intervention within 7 days of onset; may recur with various frequency. Persistent - Fails to terminate within 7 days; Often requires meds or   cardioversion to restore to NSR. Permanent - longstanding & persistent; Medication has been ineffective in   restoring NSR &/or cardioversion is contraindicated    Definitions per MS-DRG Training Guide and Quick Reference Guide, Cecilio [address]ellmar [address] 5   Diseases and Disorders of the Circulatory System; 2019; [employer]. Software content   from the [employer]?  Advanced CDI Transformation Program  Options provided:  -- Paroxysmal Atrial Fibrillation  -- Longstanding Persistent Atrial Fibrillation  -- Permanent Atrial Fibrillation  -- Persistent Atrial Fibrillation  -- Chronic Atrial Fibrillation, unspecified  -- Other - I will add my own diagnosis  -- Disagree - Not applicable / Not valid  -- Disagree - Clinically unable to determine / Unknown  -- Refer to Clinical Documentation Reviewer    PROVIDER RESPONSE TEXT:    This patient has longstanding persistent atrial fibrillation.     Query created by: Sarai Bullard on 3/1/2022 11:25 AM      Electronically signed by:  Stephanie Sullivan MD 3/1/2022 7:02 PM

## 2022-03-02 NOTE — PROGRESS NOTES
Physical Therapy    Facility/Department: [facility] CVICU  Initial Assessment    NAME: Carolyn Padilla Dr Gill Kras  : 1931  MRN: [id number]    Date of Service: 3/2/2022    Discharge Recommendations:  Patient would benefit from continued therapy after discharge       PER HPI: 54-year-old male with known medical history of A. fib on Coumadin, noncompliant to digoxin and Cardizem at home presented weak and having poor appetite. Patient requested fluid hydration in the ER. Patient was noted to be in A. fib with RVR. Cardizem drip was started. Digoxin level was low. Assessment   Body structures, Functions, Activity limitations: Decreased functional mobility ; Decreased safe awareness;Decreased balance;Decreased cognition;Decreased strength;Decreased endurance  Assessment: Skilled therapy needed to maximize independence with functional mobility as well as improve strength, endurance, balance and safety. Recommend continued therapy following dicharge. Prognosis: Good  Decision Making: Medium Complexity  Exam: AROM, strength, endurance, balance, mobility, AM-PAC  Clinical Presentation: evolving  PT Education: Goals;Transfer Training;PT Role;General Safety;Plan of Care;Gait Training  REQUIRES PT FOLLOW UP: Yes  Activity Tolerance  Activity Tolerance: Patient limited by fatigue;Patient limited by endurance       Patient Diagnosis(es): The primary encounter diagnosis was General weakness. A diagnosis of Atrial fibrillation, unspecified type [facility]) was also pertinent to this visit. has a past medical history of Anemia, Atrial fibrillation (HCC), Atrophy of testis, Hyperlipidemia, Hypothyroidism, Osteoporosis, Overactive bladder, Palpitations, Prostate cancer ([facility] [address] Vitamin D deficiency, and Weight loss. has no past surgical history on file.     Restrictions  Restrictions/Precautions  Restrictions/Precautions: General Precautions,Fall Risk  Position Activity Restriction  Other position/activity restrictions: IV, telemetry, assessment  Strength RLE  Comment:   Strength LLE  Comment:   Strength RUE  Comment: refer to OT assessment  Strength LUE  Comment: refer to OT assessment  Motor Control  Gross Motor?: WFL     Bed mobility  Supine to Sit: Moderate assistance;2 Person assistance  Sit to Supine: Moderate assistance;2 Person assistance  Scootin Person assistance; Moderate assistance  Comment: cues for correct hand placement, patient had difficulty pushing up with UEs so increased assist needed to get patient sitting. Patient sat EOB with SBA. Transfers  Sit to Stand: Moderate Assistance;2 Person Assistance  Stand to sit: Moderate Assistance;2 Person Assistance  Comment: cues for correct hand placement, significant forward flexed posture, able to correct with cues  Ambulation  Ambulation?: Yes  More Ambulation?: No  Ambulation 1  Surface: level tile  Device: Rolling Walker  Assistance:  Moderate assistance;2 Person assistance  Gait Deviations: Slow Rocio;Decreased step length  Distance: 30 feet  Comments: patient needed assist with guiding walker, patient was walking into objects with it, repeated cues for proper walker use, wanted hands on front of walker and not on hand , slightly impulsive at times, fatigues quickly     Balance  Posture: Fair  Sitting - Static: Fair;+  Sitting - Dynamic: Fair;+  Standing - Static: Fair  Standing - Dynamic: [address]  Times per week: 1-2x/day for 5-6 days/week  Current Treatment Recommendations: Strengthening,Transfer Training,Endurance Training,Cognitive Reorientation,Patient/Caregiver Education & Training,Balance Training,Gait Training,Home Exercise Program,Safety Education & Training  Safety Devices  Type of devices: Gait belt,Nurse notified,Left in bed,Call light within reach,Bed alarm in place      AM-PAC Score  AM-PAC Inpatient Mobility Raw Score : 12 (22)  AM-PAC Inpatient T-Scale Score : 35.33 (22)  Mobility Inpatient CMS 0-100% Score: 68.66 (03/02/22 1200)  Mobility Inpatient CMS G-Code Modifier : CL (03/02/22 1200)          Goals  Short term goals  Time Frame for Short term goals: 12 visits  Short term goal 1: Independent bed mobility  Short term goal 2: Independent sit<>stand  Short term goal 3: Patient to ambulate 100 feet with roll walker SBA  Short term goal 4: Patient to perform 25 minutes ther act to facilitate improving strength, endurance, balance  Patient Goals   Patient goals : to go home       Therapy Time   Individual Concurrent Group Co-treatment   Time In       0933   Time Out       1001 (additional 10 minutes for chart review)   Minutes       28+10=38    Treatment time: 23 minutes  Co-treatment with OT warranted secondary to decreased safety and independence requiring 2 skilled therapy professionals to address individual discipline's goals. PT addressing transfer training.          Francesca Newberry, PT

## 2022-03-02 NOTE — PROGRESS NOTES
Warfarin Dosing - Pharmacy Consult Note  Consulting Provider: Bernadette Scheuermann  Indication:  Atrial Fibrillation  Warfarin Dose prior to admission: 4mg daily    Concurrent anticoagulants/antiplatelets: none   Significant Drug Interactions: No obvious interactions  Recent Labs     02/28/22  1920 03/01/22  0808 03/02/22  0331   INR 3.4 3.1 3.4   HGB 9.9*  --   --      --   --    LABALBU 3.0* 3.0*  --      Recent warfarin administrations        No warfarin orders with administrations found. Orders not given:            warfarin placeholder: dosing by pharmacy                   Date   INR    Dose  2/28       3.4         ?  3/1          3.1       none  3/4          3.4       none     Assessment/Plan  (Goal INR: 2 - 3)  Inr up to 3.4 today. No coumadin today. Inr in am.     Active problem list reviewed. INR orders are placed. Chart reviewed for pertinent labs, drug/diet interactions, and past doses. Documentation of patient's clinical condition was reviewed.     Pharmacy Dosing:  Pharmacy will continue to follow.   '

## 2022-03-02 NOTE — FLOWSHEET NOTE
RN spoke with the patient and his caretaker at the bedside, and patient is agreeable now to a rehab facility. RN notified discharge planner.

## 2022-03-02 NOTE — CONSULTS
Physical Medicine & Rehabilitation  Consult Note      Admitting Physician:   Yue Lynch MD    Primary Care Provider:   No primary care provider on file. Reason for Consult:  Acute Inpatient Rehabilitation    Chief Complaint: Generalized weakness    History of Present Illness:  Referring Provider is requesting an evaluation for appropriate placement upon discharge from acute care. History from chart review and patient. Dyllan Huseyin Rao Stone Blackburn is a 80 y.o. RHD male admitted to [address] [place] [address],[address] on 2/28/2022. 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 Diagnostic studies showed elevated BNP, low digoxin level, and EKG showed A Fib. He was treated initially with Cardizem drip. Cardiology consult was placed but patient declined it. Heart rate increases with activity to 150s. Pharmacy to dose coumadin. IM is holding lasix for dehydration. When asked if he is willing to work with PT/OT today he states, \" that's why I'm here. \" Discussed admission criteria for acute rehabilitation and average length of stay as well as requirement to perform 3 hours of therapy per day. He expressed understanding and interest in acute rehab admission.      Review of Systems:  Constitutional: negative for anorexia, chills, fatigue, fevers, sweats and weight loss  Eyes: negative for redness and visual disturbance  Ears, nose, mouth, throat, and face: negative for earaches, sore throat and tinnitus  Respiratory: negative for cough and shortness of breath - no symptoms reported today related to CXR findings   Cardiovascular: negative for chest pain, dyspnea and palpitations  Gastrointestinal: negative for abdominal pain, change in bowel habits, constipation, nausea and vomiting  Genitourinary:negative for dysuria, frequency, hesitancy and urinary incontinence  Integument/breast: negative for pruritus and rash  Musculoskeletal:negative for stiff joints  Neurological: negative for dizziness, headaches Behavioral/Psych: negative for decreased appetite, depression        Premorbid function:  Independent    Current function:    PT:  Restrictions/Precautions: General Precautions,Fall Risk  Other position/activity restrictions: IV, telemetry, A-fib   Transfers  Sit to Stand: Moderate Assistance,2 Person Assistance  Stand to sit: Moderate Assistance,2 Person Assistance  Comment: cues for correct hand placement, significant forward flexed posture, able to correct with cues  Ambulation 1  Surface: level tile  Device: Rolling Walker  Assistance: Moderate assistance,2 Person assistance  Gait Deviations: Slow Rocio,Decreased step length  Distance: 30 feet  Comments: patient needed assist with guiding walker, patient was walking into objects with it, repeated cues for proper walker use, wanted hands on front of walker and not on hand , slightly impulsive at times, fatigues quickly    Transfers  Sit to Stand: Moderate Assistance,2 Person Assistance  Stand to sit: Moderate Assistance,2 Person Assistance  Comment: cues for correct hand placement, significant forward flexed posture, able to correct with cues  Ambulation  Ambulation?: Yes  More Ambulation?: No  Ambulation 1  Surface: level tile  Device: Rolling Walker  Assistance: Moderate assistance,2 Person assistance  Gait Deviations: Slow Rocio,Decreased step length  Distance: 30 feet  Comments: patient needed assist with guiding walker, patient was walking into objects with it, repeated cues for proper walker use, wanted hands on front of walker and not on hand , slightly impulsive at times, fatigues quickly    Surface: level tile  Ambulation 1  Surface: level tile  Device: Rolling Walker  Assistance:  Moderate assistance,2 Person assistance  Gait Deviations: Slow Rocio,Decreased step length  Distance: 30 feet  Comments: patient needed assist with guiding walker, patient was walking into objects with it, repeated cues for proper walker use, wanted hands on front of walker and not on hand , slightly impulsive at times, fatigues quickly      OT:   ADL  Feeding: Independent,Setup  Grooming: Minimal assistance  UE Bathing: Moderate assistance  LE Bathing: Maximum assistance,Moderate assistance  UE Dressing: Moderate assistance  LE Dressing: Maximum assistance  Toileting: Moderate assistance  Additional Comments: pt is limited by dec. standing balance, generalized weakness, and dec. activity tolerance. Pt has difficulty bendng d/t edema in B LEs; max A to jb socks and to pull up brief. Balance  Sitting Balance: Contact guard assistance  Standing Balance: Moderate assistance      Functional Mobility  Functional - Mobility Device: Rolling Walker  Assist Level: Moderate assistance (-mod A x 2 for mob in room bed to window and back. Pt needing assist to move/guide walker entire time up, mod-max verbal cues to stay close to device and to keep centered inside while also keeping hands on handles. Pt veering to L at times)  Functional Mobility Comments: pt veering to L in walking back to bed. Needing constant cues and assist to straighten out walker in order to not run into objects     Bed mobility  Supine to Sit: Moderate assistance,2 Person assistance  Sit to Supine: Moderate assistance,2 Person assistance  Scootin Person assistance,Moderate assistance  Comment: cues for correct hand placement, patient had difficulty pushing up with UEs so increased assist needed to get patient sitting. Patient sat EOB with SBA. Transfers  Sit to stand: Minimal assistance,Moderate assistance,2 Person assistance  Stand to sit: 2 Person assistance,Moderate assistance,Minimal assistance  Transfer Comments: pt c/o some dizziness with first sitting at EOB[employer] Inc. time given to acclimate, BP taken and WFLs. Pt needing max verbal cues for hand placement to push up from bed and then to reach for walker once up.                  SLP:      Past Medical History:        Diagnosis Date    Anemia     Atrial fibrillation (HCC)     Atrophy of testis     Hyperlipidemia     Hypothyroidism     Osteoporosis     Overactive bladder     Palpitations     Prostate cancer ([facility] [address]     Vitamin D deficiency     Weight loss        Past Surgical History:    History reviewed. No pertinent surgical history. Allergies:     Allergies   Allergen Reactions    Sulfa Antibiotics         Current Medications:   Current Facility-Administered Medications: dilTIAZem (CARDIZEM CD) extended release capsule 180 mg, 180 mg, Oral, Daily  warfarin placeholder: dosing by pharmacy, , Other, RX Placeholder  sodium chloride flush 0.9 % injection 5-40 mL, 5-40 mL, IntraVENous, 2 times per day  sodium chloride flush 0.9 % injection 10 mL, 10 mL, IntraVENous, PRN  0.9 % sodium chloride infusion, 25 mL, IntraVENous, PRN  ondansetron (ZOFRAN-ODT) disintegrating tablet 4 mg, 4 mg, Oral, Q8H PRN **OR** ondansetron (ZOFRAN) injection 4 mg, 4 mg, IntraVENous, Q6H PRN  polyethylene glycol (GLYCOLAX) packet 17 g, 17 g, Oral, Daily PRN  acetaminophen (TYLENOL) tablet 650 mg, 650 mg, Oral, Q6H PRN **OR** acetaminophen (TYLENOL) suppository 650 mg, 650 mg, Rectal, Q6H PRN  glucose (GLUTOSE) 40 % oral gel 15 g, 15 g, Oral, PRN  glucagon (rDNA) injection 1 mg, 1 mg, IntraMUSCular, PRN  dextrose 5 % solution, 100 mL/hr, IntraVENous, PRN  perflutren lipid microspheres (DEFINITY) injection 1.65 mg, 1.5 mL, IntraVENous, ONCE PRN  dextrose bolus (hypoglycemia) 10% 125 mL, 125 mL, IntraVENous, PRN **OR** dextrose bolus (hypoglycemia) 10% 250 mL, 250 mL, IntraVENous, PRN    Social History:  Lives With: Alone  Type of Home: House  Home Layout: One level  Home Access: Stairs to enter without rails  Entrance Stairs - Number of Steps: 1  Bathroom Shower/Tub: Walk-in shower  Bathroom Toilet: Standard  Bathroom Equipment: Shower chair,Grab bars in shower  Receives Help From: Personal care attendant  ADL Assistance: Independent  Homemaking Assistance: Needs assistance  Ambulation Assistance: Independent  Transfer Assistance: Independent  Active : Yes  Occupation: Retired  Type of occupation: physician  Additional Comments: no falls. Social History     Socioeconomic History    Marital status: Single     Spouse name: None    Number of children: None    Years of education: None    Highest education level: None   Occupational History    None   Tobacco Use    Smoking status: Never Smoker    Smokeless tobacco: Never Used   Vaping Use    Vaping Use: None   Substance and Sexual Activity    Alcohol use: Never    Drug use: Never    Sexual activity: None   Other Topics Concern    None   Social History Narrative    None     Social Determinants of Health     Financial Resource Strain:     Difficulty of Paying Living Expenses: Not on file   Food Insecurity:     Worried About Running Out of Food in the Last Year: Not on file    Salome of Food in the Last Year: Not on file   Transportation Needs:     Lack of Transportation (Medical): Not on file    Lack of Transportation (Non-Medical):  Not on file   Physical Activity:     Days of Exercise per Week: Not on file    Minutes of Exercise per Session: Not on file   Stress:     Feeling of Stress : Not on file   Social Connections:     Frequency of Communication with Friends and Family: Not on file    Frequency of Social Gatherings with Friends and Family: Not on file    Attends Anabaptism Services: Not on file    Active Member of [address] [place] [address] or Organizations: Not on file    Attends Club or Organization Meetings: Not on file    Marital Status: Not on file   Intimate Partner Violence:     Fear of Current or Ex-Partner: Not on file    Emotionally Abused: Not on file    Physically Abused: Not on file    Sexually Abused: Not on file   Housing Stability:     Unable to Pay for Housing in the Last Year: Not on file    Number of [place] in the Last Year: Not on file    Unstable Housing in the Last Year: Not on file       Family History:   History reviewed. No pertinent family history. Diagnostics:    CBC:   Recent Labs     02/28/22 1920   WBC 10.5   RBC 3.91*   HGB 9.9*   HCT 34.9*   MCV 89.3   RDW 19.0*        BMP:    Recent Labs     02/28/22 1920 03/01/22  0808 03/02/22  0331   GLUCOSE 124* 130* 162*   BUN 65* 59* 52*   CREATININE 1.04 0.96 0.90   CALCIUM 8.6 9.2 8.4*    143 138   K 3.6* 3.3* 4.2    104 102   CO2 27 29 26   ANIONGAP 11 10 10   LABGLOM >60 >60 >60   GFRAA >60 >60 >60   GFR                    HbA1c: No results found for: LABA1C  BNP: Results for Jas Feldman DR (MRN [id number]) as of 3/2/2022 08:09   Ref. Range 3/1/2022 08:08   Pro-BNP Latest Ref Range: <300 pg/mL 8,900 (H)     PT/INR:   Recent Labs     02/28/22 1920 03/01/22  0808 03/02/22  0331   PROTIME 33.0* 31.0* 33.0*   INR 3.4 3.1 3.4     APTT: No results for input(s): APTT in the last 72 hours. CARDIAC ENZYMES:   Recent Labs     02/28/22 2111 03/01/22  0011 03/01/22  0808   TROPHS 98* 113* 100*      FASTING LIPID PANEL:No results found for: CHOL, HDL, TRIG  LIVER PROFILE:   Recent Labs     02/28/22 1920 03/01/22  0808   * 81*   ALT 59* 53*   BILITOT 0.96 0.79   ALKPHOS 100 98        Radiology:    CXR 2/28/22  FINDINGS:   Marginal inspiration is present.  Cardiomegaly is noted.  Small right pleural   effusion, and right basilar opacity is present, and may represent a   combination of atelectasis and pneumonia.  No pneumothorax is noted. Evidence of old granulomatous disease is present.  Vascular markings are   distinct.  No acute osseous abnormality is present.           Impression   1. Cardiomegaly, without evidence of CHF   2.  Small right pleural effusion and right basilar atelectasis             Physical Exam:  /79   Pulse 110   Temp 97.4 °F (36.3 °C) (Temporal)   Resp 16   Ht 5' 4\" (1.626 m)   Wt 152 lb (68.9 kg)   SpO2 93%   BMI 26.09 kg/m²     GEN: Well developed, thin male, no acute distress. HEENT: NCAT, PERRL, EOMI, mucous membranes pink and moist. Poor dentition  RESP: Lungs clear to auscultation. No rales or rhonchi. Respirations WNL and unlabored. CV: tachycardic rate irregular rhythm. No murmurs, rubs, or gallops. ABD: soft, non-distended, non-tender. BS+ and equal.  NEURO: A&O x 3. Sensation intact to light touch. DTRs 2+  MSK: Functional ROM. Muscle tone and bulk are normal bilaterally. Strength 4+/5 key muscles BUEs, 4/5 key muscles BLEs  EXT: No calf tenderness to palpation. 1+ pitting edema BLEs. SKIN: Warm dry and intact with good turgor. PSYCH: Mood WNL. Affect WNL. Appropriately interactive. Impression:  1. Debility secondary to dehydration  2. Chronic Atrial fibrillation: rate controlled. Transitioned from IV to [address]. On coumadin  3. Hypothyroidism  4. Hypokalemia  5. Overactive bladder  6. Elevated BNP    Recommendations:    1. Diagnosis:  Debility  2. Therapy: Has PT/OT needs  3. Medical Necessity: As above  4. Support: Has a personal caregiver  5. Rehab Recommendation: The patient will benefit from acute inpatient rehabilitation once medically stable per primary and consulting services. Anticipate he will be able to tolerate 3 hours of therapy per day or 900 minutes per week in rehabilitation. The patient requires multidisciplinary rehabilitation treatment including medical management by a PM&R physician, 24 hour rehabilitation nursing, Physical/Occupational therapy, rehabilitation social work, and nutrition services. Patient and family also require education in post-hospital precautions and home exercise routine, adaptive techniques and deficit compensation strategies, strengthening and conditioning, equipment prescription and instructions in use. 6. DVT Prophylaxis: on coumadin, INR supratherapeutic today, pharmacy dosing    It was my pleasure to evaluate Александр Lockhart today. Please call with questions.     Celeste Catalan MD        This note is created with the assistance of a speech recognition program.  While intending to generate a document that actually reflects the content of the visit, the document can still have some errors including those of syntax and sound a like substitutions which may escape proof reading. In such instances, actual meaning can be extrapolated by contextual diversion.

## 2022-03-02 NOTE — PROGRESS NOTES
Occupational Therapy   Occupational Therapy Initial Assessment  Date: 3/2/2022   Patient Name: Tl Guerrero  MRN: [id number]     : 1931    Date of Service: 3/2/2022    Discharge Recommendations:  Patient would benefit from continued therapy after discharge  OT Equipment Recommendations  Equipment Needed: Yes  Mobility Devices: Carla Clark  RN reports patient is medically stable for therapy treatment this date. Chart reviewed prior to treatment and patient is agreeable for therapy. All lines intact and patient positioned comfortably at end of treatment. All patient needs addressed prior to ending therapy session. Assessment   Performance deficits / Impairments: Decreased functional mobility ; Decreased ADL status; Decreased strength;Decreased safe awareness;Decreased endurance;Decreased balance;Decreased posture;Decreased cognition  Assessment: Skilled OT is indicated to increase overall safety awareness in function as well as strenght, balance, ADL status, functional mobility, and cognition to improve functional outcomes, I, and return to home. Prognosis: Good  Decision Making: High Complexity  OT Education: OT Role;Plan of Care;Home Exercise Program;Precautions; ADL Adaptive Strategies;Transfer Training;Energy Conservation; Family Education;Equipment  Patient Education: use of call light, fall prevention, rec for further therapy  Barriers to Learning: [place]? cognition/insight  REQUIRES OT FOLLOW UP: Yes  Activity Tolerance  Activity Tolerance: Patient Tolerated treatment well;Patient limited by fatigue  Safety Devices  Safety Devices in place: Yes  Type of devices: Call light within reach;Nurse notified;Gait belt;Bed alarm in place; Left in bed;Patient at risk for falls           Patient Diagnosis(es): The primary encounter diagnosis was General weakness. A diagnosis of Atrial fibrillation, unspecified type [facility]) was also pertinent to this visit.      has a past medical history of Anemia, Atrial fibrillation ([facility] [address] Atrophy of testis, Hyperlipidemia, Hypothyroidism, Osteoporosis, Overactive bladder, Palpitations, Prostate cancer ([facility] [address] Vitamin D deficiency, and Weight loss. has no past surgical history on file. Restrictions  Restrictions/Precautions  Restrictions/Precautions: General Precautions,Fall Risk  Position Activity Restriction  Other position/activity restrictions: IV, telemetry, A-fib    Subjective   General  Chart Reviewed: Yes  Patient assessed for rehabilitation services?: Yes  Family / Caregiver Present: No  Patient Currently in Pain: Denies  Vital Signs  Patient Currently in Pain: Denies  Social/Functional History  Social/Functional History  Lives With: Alone  Type of Home: House  Home Layout: One level  Home Access: Stairs to enter without rails  Entrance Stairs - Number of Steps: 1  Bathroom Shower/Tub: Walk-in shower  Bathroom Toilet: Standard  Bathroom Equipment: Shower chair,Grab bars in shower  Receives Help From: Personal care attendant  ADL Assistance: Independent  Homemaking Assistance: Needs assistance  Ambulation Assistance: Independent  Transfer Assistance: Independent  Active : Yes  Occupation: Retired  Type of occupation: physician  Additional Comments: no falls       Objective   Vision: Impaired  Vision Exceptions: Wears glasses at all times  Hearing: Within functional limits    Orientation  Overall Orientation Status: Within Functional Limits  Observation/Palpation  Posture: Fair (forward head, flexed posture)  Observation: pt lying in bed. Agreeable to working with therapy. Edema: B LEs (thighs>feet)  Balance  Sitting Balance: Contact guard assistance  Standing Balance: Moderate assistance  Functional Mobility  Functional - Mobility Device: Rolling Walker  Assist Level: Moderate assistance (-mod A x 2 for mob in room bed to window and back.  Pt needing assist to move/guide walker entire time up, mod-max verbal cues to stay close to device and to keep centered inside while also keeping hands on handles. Pt veering to L at times)  Functional Mobility Comments: pt veering to L in walking back to bed. Needing constant cues and assist to straighten out walker in order to not run into objects  ADL  Feeding: Independent;Setup  Grooming: Minimal assistance  UE Bathing: Moderate assistance  LE Bathing: Maximum assistance; Moderate assistance  UE Dressing: Moderate assistance  LE Dressing: Maximum assistance  Toileting: Moderate assistance  Additional Comments: pt is limited by dec. standing balance, generalized weakness, and dec. activity tolerance. Pt has difficulty bendng d/t edema in B LEs; max A to jb socks and to pull up brief. Tone RUE  RUE Tone: Normotonic  Tone LUE  LUE Tone: Normotonic  Coordination  Movements Are Fluid And Coordinated: Yes     Bed mobility  Supine to Sit: Moderate assistance;2 Person assistance  Sit to Supine: Moderate assistance;2 Person assistance  Scooting: Moderate assistance;2 Person assistance;Maximal assistance  Comment: pt needing mod-max verbal cues to use UEs and rails to assist with bed mob. Transfers  Sit to stand: Minimal assistance; Moderate assistance;2 Person assistance  Stand to sit: 2 Person assistance; Moderate assistance;Minimal assistance  Transfer Comments: pt c/o some dizziness with first sitting at [employer] time given to acclimate, BP taken and WFLs. Pt needing max verbal cues for hand placement to push up from bed and then to reach for walker once up. Cognition  Overall Cognitive Status: Exceptions  Following Commands:  Follows multistep commands with repitition  Safety Judgement: Decreased awareness of need for assistance;Decreased awareness of need for safety  Problem Solving: Assistance required to generate solutions;Assistance required to implement solutions;Decreased awareness of errors;Assistance required to correct errors made  Insights: Decreased awareness of deficits  Initiation: Requires cues for goals. OT addressing preparation for ADL transfer, sitting balance for increased ADL performance, sitting/activity tolerance, functional reaching, environmental safety/scanning, fall prevention, functional mobility for ADL transfers, ability to sequence and follow directions and functional UE strength.        Vidal Warren, OT

## 2022-03-03 LAB
INR BLD: 2.5
PROTHROMBIN TIME: 26.1 SEC (ref 11.5–14.2)

## 2022-03-03 PROCEDURE — 36415 COLL VENOUS BLD VENIPUNCTURE: CPT

## 2022-03-03 PROCEDURE — 97530 THERAPEUTIC ACTIVITIES: CPT

## 2022-03-03 PROCEDURE — 97535 SELF CARE MNGMENT TRAINING: CPT | Performed by: NURSE PRACTITIONER

## 2022-03-03 PROCEDURE — 2060000000 HC ICU INTERMEDIATE R&B

## 2022-03-03 PROCEDURE — 2580000003 HC RX 258: Performed by: NURSE PRACTITIONER

## 2022-03-03 PROCEDURE — 6370000000 HC RX 637 (ALT 250 FOR IP): Performed by: STUDENT IN AN ORGANIZED HEALTH CARE EDUCATION/TRAINING PROGRAM

## 2022-03-03 PROCEDURE — 99232 SBSQ HOSP IP/OBS MODERATE 35: CPT | Performed by: STUDENT IN AN ORGANIZED HEALTH CARE EDUCATION/TRAINING PROGRAM

## 2022-03-03 PROCEDURE — 85610 PROTHROMBIN TIME: CPT

## 2022-03-03 RX ORDER — DIGOXIN 125 MCG
125 TABLET ORAL DAILY
Status: DISCONTINUED | OUTPATIENT
Start: 2022-03-03 | End: 2022-03-04 | Stop reason: HOSPADM

## 2022-03-03 RX ORDER — DILTIAZEM HYDROCHLORIDE 120 MG/1
120 CAPSULE, COATED, EXTENDED RELEASE ORAL DAILY
Status: DISCONTINUED | OUTPATIENT
Start: 2022-03-03 | End: 2022-03-03

## 2022-03-03 RX ORDER — WARFARIN SODIUM 2 MG/1
4 TABLET ORAL
Status: COMPLETED | OUTPATIENT
Start: 2022-03-03 | End: 2022-03-03

## 2022-03-03 RX ORDER — DILTIAZEM HYDROCHLORIDE 240 MG/1
240 CAPSULE, COATED, EXTENDED RELEASE ORAL DAILY
Status: DISCONTINUED | OUTPATIENT
Start: 2022-03-04 | End: 2022-03-04 | Stop reason: HOSPADM

## 2022-03-03 RX ORDER — DIGOXIN 125 MCG
125 TABLET ORAL DAILY
Qty: 30 TABLET | Refills: 3 | Status: SHIPPED | OUTPATIENT
Start: 2022-03-03

## 2022-03-03 RX ORDER — DILTIAZEM HYDROCHLORIDE 240 MG/1
240 CAPSULE, COATED, EXTENDED RELEASE ORAL DAILY
Qty: 30 CAPSULE | Refills: 3 | Status: ON HOLD | OUTPATIENT
Start: 2022-03-04 | End: 2022-03-30

## 2022-03-03 RX ADMIN — DILTIAZEM HYDROCHLORIDE 180 MG: 180 CAPSULE, COATED, EXTENDED RELEASE ORAL at 07:31

## 2022-03-03 RX ADMIN — SODIUM CHLORIDE, PRESERVATIVE FREE 10 ML: 5 INJECTION INTRAVENOUS at 07:32

## 2022-03-03 RX ADMIN — WARFARIN SODIUM 4 MG: 2 TABLET ORAL at 18:33

## 2022-03-03 RX ADMIN — DIGOXIN 125 MCG: 125 TABLET ORAL at 09:09

## 2022-03-03 RX ADMIN — DILTIAZEM HYDROCHLORIDE 120 MG: 120 CAPSULE, EXTENDED RELEASE ORAL at 09:09

## 2022-03-03 ASSESSMENT — PAIN SCALES - GENERAL
PAINLEVEL_OUTOF10: 0

## 2022-03-03 NOTE — DISCHARGE INSTR - COC
Continuity of Care Form    Patient Name: Dex Jhaveri Dr Chante Shirley   :  1931  MRN:  [id number]    Admit date:  2022  Discharge date: 2022      Code Status Order: Full Code   Advance Directives:      Admitting Physician:  Randal Penn MD  PCP: No primary care provider on file. Discharging Nurse: [facility] Unit/Room#: 2042/2042-01  Discharging Unit Phone Number: ***    Emergency Contact:   Extended Emergency Contact Information  Primary Emergency Contact: john shea  Mobile Phone: [phone]  Relation: Other  Secondary Emergency Contact: Marysol Nicholson  Mobile Phone: [phone]  Relation: None    Past Surgical History:  History reviewed. No pertinent surgical history. Immunization History: There is no immunization history on file for this patient. Active Problems:  Patient Active Problem List   Diagnosis Code    Atrial fibrillation with RVR ([facility]) I48.91    Hypokalemia E87.6    Hypothyroidism E03.9    Overactive bladder N32.81    Elevated brain natriuretic peptide (BNP) level R79.89    General weakness R53.1       Isolation/Infection:   Isolation            No Isolation          Patient Infection Status       None to display            Nurse Assessment:  Last Vital Signs: /77   Pulse 108   Temp 98.4 °F (36.9 °C) (Temporal)   Resp 17   Ht 5' 4\" (1.626 m)   Wt 152 lb (68.9 kg)   SpO2 95%   BMI 26.09 kg/m²     Last documented pain score (0-10 scale): Pain Level: 0  Last Weight:   Wt Readings from Last 1 Encounters:   22 152 lb (68.9 kg)     Mental Status:  disoriented and alert    IV Access:  - None    Nursing Mobility/ADLs:  Walking   Dependent  Transfer  Dependent  Bathing  Dependent  Dressing  Dependent  Toileting  Dependent  Feeding  Independent  Med Admin  Assisted  Med Delivery   whole    Wound Care Documentation and Therapy:        Elimination:  Continence:    Bowel: Yes  Bladder: Yes and some episodes of incontinence; briefed  Urinary Catheter: None Colostomy/Ileostomy/Ileal Conduit: No       Date of Last BM: 03/03/2022      Intake/Output Summary (Last 24 hours) at 3/3/2022 1342  Last data filed at 3/3/2022 0917  Gross per 24 hour   Intake 720 ml   Output 650 ml   Net 70 ml     I/O last 3 completed shifts: In: 2076.7 [P.O.:1120; I.V.:256; IV Piggyback:700.7]  Out: 825 [Urine:825]    Safety Concerns:     History of Falls (last 30 days) and confusion and weakness    Impairments/Disabilities:      Hearing    Nutrition Therapy:  Current Nutrition Therapy:   - Oral Diet:  General  - Oral Nutrition Supplement:  Standard  twice a day    Routes of Feeding: Oral  Liquids: No Restrictions  Daily Fluid Restriction: no  Last Modified Barium Swallow with Video (Video Swallowing Test): not done    Treatments at the Time of Hospital Discharge:   Respiratory Treatments: no  Oxygen Therapy:  is not on home oxygen therapy.   Ventilator:    - No ventilator support    Rehab Therapies: Physical Therapy and Occupational Therapy  Weight Bearing Status/Restrictions: No weight bearing restirctions  Other Medical Equipment (for information only, NOT a DME order):  walker, bedside commode, and hospital bed  Other Treatments: no    Patient's personal belongings (please select all that are sent with patient):  None    RN SIGNATURE:  Electronically signed by Flower Shaw RN on 3/4/22 at 2:21 PM EST    CASE MANAGEMENT/SOCIAL WORK SECTION    Inpatient Status Date:02/28/2022    Readmission Risk Assessment Score:  Readmission Risk              Risk of Unplanned Readmission:  16           Discharging to Facility/ Agency   Name: [facility]  Address:  [place] [address]. (if applicable)   Name:  Address:  Dialysis Schedule:  Phone:  Fax:    / signature: Electronically signed by KURTIS Hopper on 3/3/22 at 1:55 PM EST    PHYSICIAN SECTION    Prognosis: Guarded    Condition at Discharge: Stable    Rehab Potential (if transferring to Rehab): Fair    Recommended Labs or Other Treatments After Discharge: take meds as prescribed    Physician Certification: I certify the above information and transfer of Lino Anger Dr Duane Dadds  is necessary for the continuing treatment of the diagnosis listed and that he requires Acute Rehab for less 30 days.      Update Admission H&P: Changes in H&P as follows - discharge summary to follow    PHYSICIAN SIGNATURE:  Electronically signed by Harsh Saldivar MD on 3/3/22 at 1:43 PM EST

## 2022-03-03 NOTE — CARE COORDINATION
Social Work-Updated [facility] that patient's dc is on hold. Requested transport at [facility] tomorrow.  Cass Costa

## 2022-03-03 NOTE — DISCHARGE SUMMARY
[facility]  Office: [address], DO, Lizzy Pollock, DO, Ori Bueno, DO, Dom Queen, DO, Reuben Nix MD, Irlanda Catalan MD, Peña Harden MD, Mira Vasquez MD, Lanre Keenan MD, Tracey Steiner MD, Maura Block MD, Cristóbal Guerra, DO, Joao Rosen, DO, July Mcknight MD,  Xiomara Keane, DO, Emy Baird MD, Oza Hatchet, MD, Jeff Stahl MD, Francheska Holcomb MD, Micki Yao MD, Zelda Schirmer, CNP, Vicente Cameron, CNP, Elo Covarrubias, CNP, Marcus Herrmann, CNS, Riri Thomas, CNP, Kash Weller, CNP, Vincenzo Porter, CNP, Driss Brown, CNP, Mahsa Tirado, CNP, Sean Nguyen PA-C, Lesly Hooker, DNP, Pina [place], [facility], Maggi Mcmanus, CNP, Lauren Akers, CNP, Yogi Urias, CNP, Davy Garcia, CNP, Damian Figueroa, CNP, Carson Goltz, [facility]    Discharge Summary     Patient ID: Codey Pavon March  :  1931   MRN: [id number]     ACCOUNT:  [de-identified]   Patient's PCP: No primary care provider on file. Admit Date: 2022   Discharge Date: 3/4/22    Length of Stay: 3  Code Status:  Full Code  Admitting Physician: Oza Hatchet, MD  Discharge Physician: Oza Hatchet, MD     Active Discharge Diagnoses:     Hospital Problem Lists:  Principal Problem:    Atrial fibrillation with RVR ([facility] [address]  Active Problems:    Hypokalemia    Hypothyroidism    Overactive bladder    Elevated brain natriuretic peptide (BNP) level    General weakness  Resolved Problems:    * No resolved hospital problems.  *      Admission Condition:  poor     Discharged Condition: fair    Hospital Stay:     Hospital Course:  Codey Brandt is a 80 y.o. male who was admitted for the management of  Atrial fibrillation with RVR ([facility] [address] , presented to ER with Atrial Fibrillation, Fatigue, and Dehydration    60-year-old male with known medical history of A. fib on Coumadin, noncompliant to digoxin and Cardizem at home presented to the hospital with weakness and having poor appetite. Patient was admitted in the ER and requested for IV hydration. Patient was noted to be in A. fib with RVR with heart rate as high as 170s. Patient was started on Cardizem drip and admitted to the hospital.  Cardiology evaluation was ordered however patient refused cardiology evaluation stating that he has a cardiologist.    Initially patient refused Cardizem but later agreed to take the oral pill. Patient was also restarted on digoxin. Coumadin was continued per pharmacy dosing. Lasix was discontinued and patient was encouraged to wear compression stockings for his lower extremity edema. Patient heart rate was not completely controlled before discharge, he states that he runs 120s to 130s at home. Patient refused cardiology evaluation. Patient was very weak, worked with physical therapy and was discharged to rehab.     Significant therapeutic interventions: as above    Significant Diagnostic Studies:   Labs / Micro:  CBC:   Lab Results   Component Value Date    WBC 10.5 02/28/2022    RBC 3.91 02/28/2022    HGB 9.9 02/28/2022    HCT 34.9 02/28/2022    MCV 89.3 02/28/2022    MCH 25.3 02/28/2022    MCHC 28.4 02/28/2022    RDW 19.0 02/28/2022     02/28/2022     BMP:    Lab Results   Component Value Date    GLUCOSE 162 03/02/2022     03/02/2022    K 4.2 03/02/2022     03/02/2022    CO2 26 03/02/2022    ANIONGAP 10 03/02/2022    BUN 52 03/02/2022    CREATININE 0.90 03/02/2022    BUNCRER 58 03/02/2022    CALCIUM 8.4 03/02/2022    LABGLOM >60 03/02/2022    GFRAA >60 03/02/2022    GFR      03/02/2022     HFP:    Lab Results   Component Value Date    PROT 5.5 03/01/2022     CMP:    Lab Results   Component Value Date    GLUCOSE 162 03/02/2022     03/02/2022    K 4.2 03/02/2022     03/02/2022    CO2 26 03/02/2022    BUN 52 03/02/2022    CREATININE 0.90 03/02/2022    ANIONGAP 10 03/02/2022    ALKPHOS 98 03/01/2022    ALT 53 03/01/2022    AST 81 03/01/2022    BILITOT 0.79 03/01/2022    LABALBU 3.0 03/01/2022    LABGLOM >60 03/02/2022    GFRAA >60 03/02/2022    GFR      03/02/2022    PROT 5.5 03/01/2022    CALCIUM 8.4 03/02/2022     PT/INR:    Lab Results   Component Value Date    PROTIME 26.1 03/03/2022    INR 2.5 03/03/2022        Radiology:  XR CHEST PORTABLE    Result Date: 2/28/2022  1. Cardiomegaly, without evidence of CHF 2. Small right pleural effusion and right basilar atelectasis       Consultations:    Consults:     Final Specialist Recommendations/Findings:   IP CONSULT TO INTERNAL MEDICINE  IP CONSULT TO CARDIOLOGY  IP CONSULT TO CARDIOLOGY  IP CONSULT TO SOCIAL WORK  PHARMACY TO DOSE WARFARIN  IP CONSULT TO PHYSICAL MEDICINE REHAB      The patient was seen and examined on day of discharge and this discharge summary is in conjunction with any daily progress note from day of discharge. Discharge plan:     Disposition:  To a non-[facility] facility    Physician Follow Up:   No pcp on file     Requiring Further Evaluation/Follow Up POST HOSPITALIZATION/Incidental Findings: see cardiology if patient agrees    Diet: regular diet    Activity: As tolerated    Instructions to Patient: take meds as ordered    Discharge Medications:      Medication List        START taking these medications      dilTIAZem 240 MG extended release capsule  Commonly known as: CARDIZEM CD  Take 1 capsule by mouth daily  Start taking on: March 4, 2022            Padmini Gross taking these medications      digoxin 125 MCG tablet  Commonly known as: LANOXIN  Take 1 tablet by mouth daily     oxybutynin 5 MG tablet  Commonly known as: DITROPAN     warfarin 4 MG tablet  Commonly known as: COUMADIN            STOP taking these medications      aspirin 81 MG EC tablet     dilTIAZem 30 MG tablet  Commonly known as: CARDIZEM     ferrous sulfate 325 (65 Fe) MG EC tablet  Commonly known as: FE TABS 325     furosemide 20 MG tablet  Commonly known as: LASIX     magnesium oxide 400 MG tablet  Commonly known as: MAG-OX               Where to Get Your Medications        These medications were sent to [facility], [address] [place] [address]., [address] [place] [address]      Phone: [phone]   digoxin 125 MCG tablet  dilTIAZem 240 MG extended release capsule         No discharge procedures on file. Time Spent on discharge is  32 mins in patient examination, evaluation, counseling as well as medication reconciliation, prescriptions for required medications, discharge plan and follow up. Electronically signed by   Jody Ribera MD  3/3/2022  1:52 PM      Thank you Dr. Campos Collier primary care provider on file. for the opportunity to be involved in this patient's care.

## 2022-03-03 NOTE — PROGRESS NOTES
Warfarin Dosing - Pharmacy Consult Note  Consulting Provider: Martine Ordoñez  Indication:  Atrial Fibrillation  Warfarin Dose prior to admission: 4mg daily    Concurrent anticoagulants/antiplatelets: none   Significant Drug Interactions: No obvious interactions  Recent Labs     02/28/22  1920 02/28/22  1920 03/01/22  0808 03/02/22  0331 03/03/22  0359   INR 3.4   < > 3.1 3.4 2.5   HGB 9.9*  --   --   --   --      --   --   --   --    LABALBU 3.0*  --  3.0*  --   --     < > = values in this interval not displayed. Recent warfarin administrations        No warfarin orders with administrations found. Orders not given:            warfarin placeholder: dosing by pharmacy                   Date   INR    Dose  2/28       3.4         ?  3/1          3.1       none  3/2         3.4       none   3/3          2.5    Assessment/Plan  (Goal INR: 2 - 3)  Coumadin 4mg today. Inr in am.     Active problem list reviewed. INR orders are placed. Chart reviewed for pertinent labs, drug/diet interactions, and past doses. Documentation of patient's clinical condition was reviewed.     Pharmacy Dosing:  Pharmacy will continue to follow.   '

## 2022-03-03 NOTE — PROGRESS NOTES
Occupational Therapy  Facility/Department: [facility] CVICU  Daily Treatment Note  NAME: Hilary Davidson  : 1931  MRN: [id number]    Date of Service: 3/3/2022    Discharge Recommendations:  Patient would benefit from continued therapy after discharge    Pt is currently functional below baseline and would suggest intense therapy post acute care. Would expect patient to be able to tolerate 3 hours of therapy per day and able to tolerate at least one hour up in chair. Please refer to AM-PAC score for current mobility/adl level. Assessment   Performance deficits / Impairments: Decreased functional mobility ; Decreased ADL status; Decreased strength;Decreased safe awareness;Decreased endurance;Decreased balance;Decreased posture;Decreased cognition  Assessment: Pt currently limited by HR as well as functional status. Pt would benefit from continued skilled OT services to address deficits in areas of functional balance, functional reach, ADL completion, safety awareness, transfers, UE strength and functional mobility, all limiting safe return home to [facility]. Prognosis: Good  OT Education: OT Role;Plan of Care;Precautions; ADL Adaptive Strategies;Transfer Training;Energy Conservation;Equipment  REQUIRES OT FOLLOW UP: Yes  Activity Tolerance  Activity Tolerance: Patient Tolerated treatment well;Treatment limited secondary to medical complications (free text)  Activity Tolerance: HR varying from 110s to 154 bpm with mostly in the [id number]  Safety Devices  Safety Devices in place: Yes  Type of devices: Call light within reach;Nurse notified;Gait belt;Patient at risk for falls; All fall risk precautions in place; Left in chair;Chair alarm in place         Patient Diagnosis(es): The primary encounter diagnosis was General weakness. A diagnosis of Atrial fibrillation, unspecified type [facility]) was also pertinent to this visit.       has a past medical history of Anemia, Atrial fibrillation ([facility] [address] Atrophy of testis, Hyperlipidemia, Hypothyroidism, Osteoporosis, Overactive bladder, Palpitations, Prostate cancer ([facility] [address] Vitamin D deficiency, and Weight loss. has no past surgical history on file. Restrictions  Restrictions/Precautions  Restrictions/Precautions: General Precautions,Fall Risk  Position Activity Restriction  Other position/activity restrictions: Up with assist, alarms, telemetry, AFIB/monitor HR, IV  Subjective   General  Chart Reviewed: Yes  Patient assessed for rehabilitation services?: Yes  Response to previous treatment: Patient with no complaints from previous session  Family / Caregiver Present: No      Orientation  Orientation  Overall Orientation Status: Within Functional Limits  Objective    ADL  Grooming:  (Seated in chair)  Toileting: Moderate assistance (ModA with gown management and placement of urinal while seated EOB)        Balance  Sitting Balance: Stand by assistance  Standing Balance: Moderate assistance  Functional Mobility  Functional - Mobility Device: Rolling Walker  Bed mobility  Supine to Sit: Moderate assistance  Scooting: Moderate assistance  Comment: HOB partially raised, VC for tech and safety, pt with difficulty reaching for bed rail and holding vs holding onto writer. Transfers  Stand Step Transfers: Moderate assistance;2 Person assistance  Sit to stand: Moderate assistance;2 Person assistance  Stand to sit: Moderate assistance;2 Person assistance  Transfer Comments: VC for hand placement and tech. Stand step from EOB to chair with poor safety awareness, balance, strength, posture. Max VC for hand placement and lining up to surface before sitting with poor/no carryover. Cognition  Overall Cognitive Status: Exceptions  Following Commands: Follows one step commands with repetition; Follows one step commands with increased time  Attention Span: Difficulty attending to directions  Memory: Decreased recall of precautions  Safety Judgement: Decreased awareness of need for assistance;Decreased awareness of need for safety  Problem Solving: Assistance required to generate solutions;Assistance required to implement solutions;Decreased awareness of errors;Assistance required to correct errors made  Insights: Decreased awareness of deficits  Initiation: Requires cues for some  Sequencing: Requires cues for some        Plan   Plan  Times per week: 4-5x/week, 1-2x/day  Current Treatment Recommendations: Strengthening,Balance Training,Functional Mobility Training,Endurance Training,Safety Education & Training,Self-Care / ADL,Patient/Caregiver Education & Training,Equipment Evaluation, Education, & procurement,Positioning,Cognitive/Perceptual Training    AM-PAC Score        AM-PAC Inpatient Daily Activity Raw Score: 13 (03/03/22 1136)  AM-PAC Inpatient ADL T-Scale Score : 32.03 (03/03/22 1136)  ADL Inpatient CMS 0-100% Score: 63.03 (03/03/22 1136)  ADL Inpatient CMS G-Code Modifier : CL (03/03/22 1136)    Goals  Short term goals  Time Frame for Short term goals: by discharge, pt will  Short term goal 1: demo CGA with ADL transfers with good safety, AD/DME as needed  Short term goal 2: demo CGA with functional mob in room distances for ADL completion with AD/DME as needed and good safety  Short term goal 3: demo CGA with toileting routine with good safety/DME as needed  Short term goal 4: demo SBA with UB ADLs and min A LB ADLs with good safety/pacing and AE/DME as needed  Short term goal 5: demo and verb good understanding of fall prevention techs, EC/WS techs, equip needs, B UE HEP, and d/c recommendations  Patient Goals   Patient goals : to go home (states he will hire whatever help he needs)       Therapy Time   Individual Concurrent Group Co-treatment   Time In 1010         Time Out 1034         Minutes 24           RN reports patient is medically stable for therapy treatment this date. Chart reviewed prior to treatment and patient is agreeable for therapy.   All lines intact

## 2022-03-03 NOTE — PROGRESS NOTES
Physical Therapy  Facility/Department: [facility] CVICU  Daily Treatment Note  NAME: Augusta Islas  : 1931  MRN: [id number]    Date of Service: 3/3/2022    Discharge Recommendations:  Patient would benefit from continued therapy after discharge        Assessment   Body structures, Functions, Activity limitations: Decreased functional mobility ; Decreased safe awareness;Decreased balance;Decreased cognition;Decreased strength;Decreased endurance  Assessment: Poor endurance limiting pts mobility  Prognosis: Good  REQUIRES PT FOLLOW UP: Yes  Activity Tolerance  Activity Tolerance: Patient limited by fatigue;Patient limited by endurance  Other Comments  Comments: Ankle pumps handout     Patient Diagnosis(es): The primary encounter diagnosis was General weakness. A diagnosis of Atrial fibrillation, unspecified type [facility]) was also pertinent to this visit. has a past medical history of Anemia, Atrial fibrillation (HCC), Atrophy of testis, Hyperlipidemia, Hypothyroidism, Osteoporosis, Overactive bladder, Palpitations, Prostate cancer ([address] Vitamin D deficiency, and Weight loss. has no past surgical history on file. Restrictions  Restrictions/Precautions  Restrictions/Precautions: General Precautions,Fall Risk  Required Braces or Orthoses?: No  Position Activity Restriction  Other position/activity restrictions: Up with assist, alarms, telemetry, AFIB/monitor HR, IV  Subjective   General  Chart Reviewed: Yes  Response To Previous Treatment: Patient with no complaints from previous session. Family / Caregiver Present: No  General Comment  Comments: OK for PT per David Crawford RN  Pain Screening  Patient Currently in Pain: Denies  Vital Signs  Patient Currently in Pain: Denies       Orientation  Orientation  Overall Orientation Status: Within Functional Limits  Cognition      Objective   Bed mobility  Scooting: Moderate assistance  Transfers  Sit to Stand:  Moderate Assistance;2 Person Assistance  Stand to sit: Moderate Assistance;2 Person Assistance  Stand Pivot Transfers: Moderate Assistance;2 Person Assistance  Ambulation  Ambulation?: Yes  Ambulation 1  Surface: level tile  Device: Rolling Walker  Assistance: Moderate assistance;2 Person assistance  Distance: 3 steps to chair only  Comments: Assistance for proper sit<-->stand and transfer, poor endurance  Stairs/Curb  Stairs?: No     Balance  Posture: Fair  Sitting - Static: Good  Sitting - Dynamic: Good;-  Standing - Static: Fair  Standing - Dynamic: Fair;-                           G-Code     OutComes Score                            Timed Up and Go:  (Pt unable to perform without Mod A x 2) (03/03/22 1457)                        AM-PAC Score  AM-PAC Inpatient Mobility Raw Score : 13 (03/03/22 1456)  AM-PAC Inpatient T-Scale Score : 36.74 (03/03/22 1456)  Mobility Inpatient CMS 0-100% Score: 64.91 (03/03/22 1456)  Mobility Inpatient CMS G-Code Modifier : CL (03/03/22 1456)          Goals  Short term goals  Time Frame for Short term goals: 12 visits  Short term goal 1: Independent bed mobility  Short term goal 2:  Independent sit<>stand  Short term goal 3: Patient to ambulate 100 feet with roll walker SBA  Short term goal 4: Patient to perform 25 minutes ther act to facilitate improving strength, endurance, balance  Short term goal 5: Good balance/posture  Patient Goals   Patient goals : to go home    Plan    Plan  Times per week: 1-2x/day for 5-6 days/week  Current Treatment Recommendations: Strengthening,Transfer Training,Endurance Training,Cognitive Reorientation,Patient/Caregiver Education & Training,Balance Training,Gait Training,Home Exercise Program,Safety Education & Training  Safety Devices  Type of devices: Left in chair,Chair alarm in place,Nurse notified,Gait belt,Call light within reach  Restraints  Initially in place: No     Therapy Time   Individual Concurrent Group Co-treatment   Time In 0959         Time Out 1024         Minutes 25           Co-treatment with OT warranted secondary to decreased safety and independence requiring 2 skilled therapy professionals to address individual discipline's goals.          Miguel Tomlin, PT

## 2022-03-03 NOTE — PROGRESS NOTES
[facility]  Office: [address], DO, Bailey Roque, DO, Madyson Shukla, DO, Orin Queen, DO, Rebecca Younger MD, Dora Miguel MD, Adrian Mcnair MD, Norberto Moreno MD, Deng Langley MD, Carolynn Snellen, MD, Ramiro Kiser MD, Mir Forman, DO, lCaire Marrero, DO, Bekah Coats MD,  Essence Faye, DO, Javier Abad MD, Elodia Veliz MD, Kendra Vanegas MD, Shayan Andersen MD, Lennie Herrera MD, Cristian Ribera [facility], CNP, Gurmeet Watson, CNP, José Miguel Moran, CNS, Cristina Hand, CNP, Melecio Young, CNP, Ness Raza, CNP, Governor Flores, CNP, Kevan Jett, CNP, Kerri Jiménez PA-C, Joey Velazquez [facility], Harvey Lutz [facility], Kala Maurer, CNP, Brian Moreno, CNP, Evon Daugherty, CNP, Cristina Chilel, CNP, Bonnie Galvan, CNP, Blanca Montes [facility]    Progress Note    3/3/2022    1:44 PM    Name:   Nishi Chavarriane Bre  MRN:     [id number]     Acct:      [de-identified]   Room:   2042/204201   Day:  3  Admit Date:  2/28/2022  7:09 PM    PCP:   No primary care provider on file. Code Status:  Full Code    Subjective:     C/C:   Chief Complaint   Patient presents with    Atrial Fibrillation    Fatigue    Dehydration     Interval History Status: no change    Patient continues to have elevated heart rate slightly improved than yesterday  Patient does have elevated heart rate with exertion  Focused physical therapy, agreeable for rehab    Brief History:     25-year-old male with known medical history of A. fib on Coumadin, noncompliant to digoxin and Cardizem at home presented with weakness and having poor appetite. Patient requested fluid hydration in the ER. Patient was noted to be in A. fib with RVR. Cardizem drip was started. Digoxin level was low.     Review of Systems:     Constitutional:  negative for chills, fevers, sweats, + fatigue  Respiratory:  negative for cough, dyspnea on exertion, shortness of breath, wheezing  Cardiovascular:  negative for chest pain, chest pressure/discomfort,+ lower extremity edema, no palpitations  Gastrointestinal:  negative for abdominal pain, constipation, diarrhea, nausea, vomiting  Neurological:  negative for dizziness, headache    Medications: Allergies: Allergies   Allergen Reactions    Sulfa Antibiotics        Current Meds:   Scheduled Meds:    [START ON 3/4/2022] dilTIAZem  240 mg Oral Daily    digoxin  125 mcg Oral Daily    warfarin  4 mg Oral Once    warfarin placeholder: dosing by pharmacy   Other RX Placeholder    sodium chloride flush  5-40 mL IntraVENous 2 times per day     Continuous Infusions:    sodium chloride      dextrose       PRN Meds: sodium chloride flush, sodium chloride, ondansetron **OR** ondansetron, polyethylene glycol, acetaminophen **OR** acetaminophen, glucose, glucagon (rDNA), dextrose, perflutren lipid microspheres, dextrose bolus (hypoglycemia) **OR** dextrose bolus (hypoglycemia)    Data:     Past Medical History:   has a past medical history of Anemia, Atrial fibrillation ([address] Atrophy of testis, Hyperlipidemia, Hypothyroidism, Osteoporosis, Overactive bladder, Palpitations, Prostate cancer ([facility] [address] Vitamin D deficiency, and Weight loss. Social History:   reports that he has never smoked. He has never used smokeless tobacco. He reports that he does not drink alcohol and does not use drugs. Family History: History reviewed. No pertinent family history. Vitals:  /77   Pulse 108   Temp 98.4 °F (36.9 °C) (Temporal)   Resp 17   Ht 5' 4\" (1.626 m)   Wt 152 lb (68.9 kg)   SpO2 95%   BMI 26.09 kg/m²   Temp (24hrs), Av.9 °F (36.6 °C), Min:97.1 °F (36.2 °C), Max:98.4 °F (36.9 °C)    No results for input(s): POCGLU in the last 72 hours. I/O (24Hr):     Intake/Output Summary (Last 24 hours) at 3/3/2022 1344  Last data filed at 3/3/2022 0917  Gross per 24 hour   Intake 720 ml   Output 650 ml   Net 70 ml Labs:  Hematology:  Recent Labs     02/28/22 1920 02/28/22 1920 03/01/22 0808 03/02/22 0331 03/03/22  0359   WBC 10.5  --   --   --   --    RBC 3.91*  --   --   --   --    HGB 9.9*  --   --   --   --    HCT 34.9*  --   --   --   --    MCV 89.3  --   --   --   --    MCH 25.3  --   --   --   --    MCHC 28.4  --   --   --   --    RDW 19.0*  --   --   --   --      --   --   --   --    MPV 10.4  --   --   --   --    INR 3.4   < > 3.1 3.4 2.5    < > = values in this interval not displayed. Chemistry:  Recent Labs     02/28/22 1920 02/28/22 1920 02/28/22 2111 03/01/22 0011 03/01/22 0808 03/02/22 0331     --   --   --  143 138   K 3.6*  --   --   --  3.3* 4.2     --   --   --  104 102   CO2 27  --   --   --  29 26   GLUCOSE 124*  --   --   --  130* 162*   BUN 65*  --   --   --  59* 52*   CREATININE 1.04  --   --   --  0.96 0.90   MG  --   --   --   --  2.5  --    ANIONGAP 11  --   --   --  10 10   LABGLOM >60  --   --   --  >60 >60   GFRAA >60  --   --   --  >60 >60   CALCIUM 8.6  --   --   --  9.2 8.4*   PROBNP 10,429*  --   --   --  8,900*  --    TROPHS 101*   < > 98* 113* 100*  --    DIGOXIN <0.3*  --   --   --   --   --     < > = values in this interval not displayed. Recent Labs     02/28/22 1920 03/01/22 0808   PROT 5.8* 5.5*   LABALBU 3.0* 3.0*   TSH  --  3.86   * 81*   ALT 59* 53*   ALKPHOS 100 98   BILITOT 0.96 0.79     ABG:No results found for: POCPH, PHART, PH, POCPCO2, TRY7ZHK, PCO2, POCPO2, PO2ART, PO2, POCHCO3, JKM2PIF, HCO3, NBEA, PBEA, BEART, BE, THGBART, THB, WOP8OFC, NTOG5LAA, E9LFHJXZ, O2SAT, FIO2  No results found for: SPECIAL  No results found for: CULTURE    Radiology:  XR CHEST PORTABLE    Result Date: 2/28/2022  1. Cardiomegaly, without evidence of CHF 2.  Small right pleural effusion and right basilar atelectasis       Physical Examination:        General appearance:  alert, cooperative and no distress, dry mucous membrane  Mental Status:  oriented to person, place and time and normal affect  Lungs:  clear to auscultation bilaterally, normal effort  Heart:  regular rate and rhythm, no murmur  Abdomen:  soft, nontender, nondistended, normal bowel sounds, no masses, hepatomegaly, splenomegaly  Extremities:  1+ edema, redness, tenderness in the calves  Skin:  no gross lesions, rashes, induration    Assessment:        Hospital Problems           Last Modified POA    * (Principal) Atrial fibrillation with RVR ([address] 3/1/2022 Yes    Hypokalemia 3/1/2022 Yes    Hypothyroidism 3/1/2022 Yes    Overactive bladder 3/1/2022 Yes    Elevated brain natriuretic peptide (BNP) level 3/1/2022 Yes    General weakness 3/1/2022 Yes          Plan:        Increase cardizem to 240 mg  Start digoxin 125mcg  Pharmacy to dose Coumadin  Patient refused cardiology evaluation  Continue to hold lasix    Leg elevation and compression stockings if patient agrees  Medication list reviewed with the patient, he does not take most of the medications. PT and OT    Patient agreeable to rehab.     Dara Sanders MD  3/3/2022  1:44 PM

## 2022-03-04 VITALS
BODY MASS INDEX: 26.63 KG/M2 | RESPIRATION RATE: 18 BRPM | HEART RATE: 110 BPM | HEIGHT: 64 IN | TEMPERATURE: 97.9 F | WEIGHT: 156 LBS | DIASTOLIC BLOOD PRESSURE: 92 MMHG | SYSTOLIC BLOOD PRESSURE: 134 MMHG | OXYGEN SATURATION: 93 %

## 2022-03-04 LAB
INR BLD: 2.1
PROTHROMBIN TIME: 23.1 SEC (ref 11.5–14.2)

## 2022-03-04 PROCEDURE — 97116 GAIT TRAINING THERAPY: CPT

## 2022-03-04 PROCEDURE — 97530 THERAPEUTIC ACTIVITIES: CPT

## 2022-03-04 PROCEDURE — 6370000000 HC RX 637 (ALT 250 FOR IP): Performed by: STUDENT IN AN ORGANIZED HEALTH CARE EDUCATION/TRAINING PROGRAM

## 2022-03-04 PROCEDURE — 6370000000 HC RX 637 (ALT 250 FOR IP): Performed by: NURSE PRACTITIONER

## 2022-03-04 PROCEDURE — 85610 PROTHROMBIN TIME: CPT

## 2022-03-04 PROCEDURE — 97535 SELF CARE MNGMENT TRAINING: CPT

## 2022-03-04 PROCEDURE — 36415 COLL VENOUS BLD VENIPUNCTURE: CPT

## 2022-03-04 PROCEDURE — 99239 HOSP IP/OBS DSCHRG MGMT >30: CPT | Performed by: STUDENT IN AN ORGANIZED HEALTH CARE EDUCATION/TRAINING PROGRAM

## 2022-03-04 RX ORDER — WARFARIN SODIUM 2 MG/1
4 TABLET ORAL
Status: DISCONTINUED | OUTPATIENT
Start: 2022-03-04 | End: 2022-03-04 | Stop reason: HOSPADM

## 2022-03-04 RX ORDER — WARFARIN SODIUM 2 MG/1
4 TABLET ORAL
Status: DISCONTINUED | OUTPATIENT
Start: 2022-03-04 | End: 2022-03-04

## 2022-03-04 RX ADMIN — DILTIAZEM HYDROCHLORIDE 240 MG: 240 CAPSULE, COATED, EXTENDED RELEASE ORAL at 09:26

## 2022-03-04 RX ADMIN — DIGOXIN 125 MCG: 125 TABLET ORAL at 09:26

## 2022-03-04 RX ADMIN — POLYETHYLENE GLYCOL 3350 17 G: 17 POWDER, FOR SOLUTION ORAL at 09:26

## 2022-03-04 ASSESSMENT — PAIN SCALES - GENERAL
PAINLEVEL_OUTOF10: 0

## 2022-03-04 NOTE — CARE COORDINATION
[facility] will admit today. [facility] will transport at 3pm. Orders faxed. Nurse to call report to [phone]. Discussed with patient and caregiver. They are agreeable with dc plans.  Luz Mejia

## 2022-03-04 NOTE — PROGRESS NOTES
[facility]  Office: [address], DO, Marilynmya Carver, DO, Renata Babar, DO, Chiara Elmer Queen, DO, Tashi Hobson MD, Stone Orozco MD, Jael Atwood MD, Francisco Acharya MD, Star Pillai MD, Laure Donahue MD, Nabor Dave MD, Stephanie Ramos, DO, June Tobin, DO, Rufina Hood MD,  Lindsay Morales, DO, Diego Antony MD, Aurora Jordan MD, Ángel Rojo MD, Nellie Krause MD, Britt Gaitan MD, Jailene Hyman, [facility], [facility] Abel, CNP, Mirian Malone, CNP, Beni Quesada, CNS, Hugo Allen CNP, Freddy Roman, CNP, Kavitha Lira, CNP, Loretta Hernandez, CNP, Abiel Morales, CNP, Baldomero Navarro PA-C, Concepción Bennett [facility], Kae London [facility], Aniket Douglass, CNP, Nathalie Hardin, CNP, Dharmesh Soares, CNP, Dany Green, CNP, Kevan Shin, CNP, Bailey Murphy, [facility]    Progress Note    3/4/2022    11:10 AM    Name:   Sammi Ann  MRN:     [id number]     Acct:      [de-identified]   Room:   2042/2042-01   Day:  4  Admit Date:  2/28/2022  7:09 PM    PCP:   No primary care provider on file. Code Status:  Full Code    Subjective:     C/C:   Chief Complaint   Patient presents with    Atrial Fibrillation    Fatigue    Dehydration     Interval History Status: no change    Patient refuses to get CT head today  No acute events overnight  Heart rate better controlled    Brief History:     70-year-old male with known medical history of A. fib on Coumadin, noncompliant to digoxin and Cardizem at home presented with weakness and having poor appetite. Patient requested fluid hydration in the ER. Patient was noted to be in A. fib with RVR. Cardizem drip was started. Digoxin level was low. Patient was switched from Cardizem from drip to oral, digoxin was restarted. As per the caregiver patient was found down at home, CT head was ordered but patient continued to refuse to get the CT head.   Patient refused cardiology evaluation    Review of Systems:     Constitutional:  negative for chills, fevers, sweats, + fatigue  Respiratory:  negative for cough, dyspnea on exertion, shortness of breath, wheezing  Cardiovascular:  negative for chest pain, chest pressure/discomfort,+ lower extremity edema, no palpitations  Gastrointestinal:  negative for abdominal pain, constipation, diarrhea, nausea, vomiting  Neurological:  negative for dizziness, headache    Medications: Allergies: Allergies   Allergen Reactions    Sulfa Antibiotics        Current Meds:   Scheduled Meds:    warfarin  4 mg Oral Once    dilTIAZem  240 mg Oral Daily    digoxin  125 mcg Oral Daily    warfarin placeholder: dosing by pharmacy   Other RX Placeholder    sodium chloride flush  5-40 mL IntraVENous 2 times per day     Continuous Infusions:    sodium chloride      dextrose       PRN Meds: sodium chloride flush, sodium chloride, ondansetron **OR** ondansetron, polyethylene glycol, acetaminophen **OR** acetaminophen, glucose, glucagon (rDNA), dextrose, perflutren lipid microspheres, dextrose bolus (hypoglycemia) **OR** dextrose bolus (hypoglycemia)    Data:     Past Medical History:   has a past medical history of Anemia, Atrial fibrillation ([facility] [address] Atrophy of testis, Hyperlipidemia, Hypothyroidism, Osteoporosis, Overactive bladder, Palpitations, Prostate cancer ([facility] [address] Vitamin D deficiency, and Weight loss. Social History:   reports that he has never smoked. He has never used smokeless tobacco. He reports that he does not drink alcohol and does not use drugs. Family History: History reviewed. No pertinent family history. Vitals:  /88   Pulse 132   Temp 97.9 °F (36.6 °C) (Temporal)   Resp 18   Ht 5' 4\" (1.626 m)   Wt 156 lb (70.8 kg)   SpO2 93%   BMI 26.78 kg/m²   Temp (24hrs), Av.6 °F (36.4 °C), Min:97.2 °F (36.2 °C), Max:98.4 °F (36.9 °C)    No results for input(s): POCGLU in the last 72 hours. I/O (24Hr):     Intake/Output Summary (Last 24 hours) at 3/4/2022 1110  Last data filed at 3/4/2022 0551  Gross per 24 hour   Intake 1200 ml   Output 1025 ml   Net 175 ml       Labs:  Hematology:  Recent Labs     03/02/22  0331 03/03/22  0359 03/04/22  0526   INR 3.4 2.5 2.1     Chemistry:  Recent Labs     03/02/22  0331      K 4.2      CO2 26   GLUCOSE 162*   BUN 52*   CREATININE 0.90   ANIONGAP 10   LABGLOM >60   GFRAA >60   CALCIUM 8.4*     No results for input(s): PROT, LABALBU, LABA1C, U3KFCEK, X6GFADW, FT4, TSH, AST, ALT, LDH, GGT, ALKPHOS, LABGGT, BILITOT, BILIDIR, AMMONIA, AMYLASE, LIPASE, LACTATE, CHOL, HDL, LDLCHOLESTEROL, CHOLHDLRATIO, TRIG, VLDL, JCR34WB, PHENYTOIN, PHENYF, URICACID, POCGLU in the last 72 hours. ABG:No results found for: POCPH, PHART, PH, POCPCO2, ATB3TFY, PCO2, POCPO2, PO2ART, PO2, POCHCO3, ARV2RAU, HCO3, NBEA, PBEA, BEART, BE, THGBART, THB, DCL5BWC, JQTJ4HCD, W1QMMONW, O2SAT, FIO2  No results found for: SPECIAL  No results found for: CULTURE    Radiology:  XR CHEST PORTABLE    Result Date: 2/28/2022  1. Cardiomegaly, without evidence of CHF 2.  Small right pleural effusion and right basilar atelectasis       Physical Examination:        General appearance:  alert, cooperative and no distress  Mental Status:  oriented to person, place and time and normal affect  Lungs:  clear to auscultation bilaterally, normal effort  Heart: ir-regular rate and rhythm, no murmur  Abdomen:  soft, nontender, nondistended, normal bowel sounds, no masses, hepatomegaly, splenomegaly  Extremities:  1+ edema, redness, tenderness in the calves  Skin:  no gross lesions, rashes, induration    Assessment:        Hospital Problems           Last Modified POA    * (Principal) Atrial fibrillation with RVR ([address] 3/1/2022 Yes    Hypokalemia 3/1/2022 Yes    Hypothyroidism 3/1/2022 Yes    Overactive bladder 3/1/2022 Yes    Elevated brain natriuretic peptide (BNP) level 3/1/2022 Yes    General weakness 3/1/2022 Yes          Plan:        Continue Cardizem to 40 mg  Continue digoxin  Pharmacy to dose Coumadin  Patient refused cardiology evaluation  Continue to hold lasix  Patient refuses CT head  Leg elevation and compression stockings if patient agrees  Medication list reviewed with the patient, he does not take most of the medications.   PT and OT    Patient agreeable to rehab today    Dara Sanders MD  3/4/2022  11:10 AM

## 2022-03-04 NOTE — PROGRESS NOTES
This writer gave report to Julius at [facility] and detailed the need for his 4 mg coumadin dose to be given upon arrival

## 2022-03-04 NOTE — PLAN OF CARE
Problem: Falls - Risk of:  Goal: Will remain free from falls  Description: Will remain free from falls  Outcome: Ongoing  Note: Pt free from falls, fall risk education reinforced, bed alarm maintained this shift. Goal: Absence of physical injury  Description: Absence of physical injury  Outcome: Ongoing     Problem: Skin Integrity:  Goal: Will show no infection signs and symptoms  Description: Will show no infection signs and symptoms  Outcome: Ongoing  Note: Pt afebrile this shift. Goal: Absence of new skin breakdown  Description: Absence of new skin breakdown  Outcome: Ongoing  Note: Skin assessment completed, no new breakdown noted this shift.

## 2022-03-04 NOTE — PROGRESS NOTES
Occupational Therapy  Facility/Department: [facility]  Daily Treatment Note  NAME: Jaylen Thrasher Dr Juli King  : 1931  MRN: [id number]    Date of Service: 3/4/2022    Discharge Recommendations:  Patient would benefit from continued therapy after discharge   Pt currently functioning below baseline. Would suggest additional therapy at time of discharge to maximize long term outcomes and prevent re-admission. Please refer to AM-PAC score for current level of function. Assessment   Performance deficits / Impairments: Decreased functional mobility ; Decreased ADL status; Decreased strength;Decreased safe awareness;Decreased endurance;Decreased balance;Decreased posture;Decreased cognition  Assessment: Pt is very limited by increased HR with minimal activity. Pt req'd 2 staff assist with transfers due to poor safety and weakness and for all self care tasks. Skilled OT indicated to increase safety and IND with ADLs and to increase overall stength/endurance for a safe return to PLOF. Prognosis: Good;Fair  OT Education: Transfer Training  Patient Education: Pt issued B UE/LE/cervical AROM HEP and handouts on EC and fall prevention. Pt with high HR at end of session and RN present. Will need to educate pt on handouts at next session as able. Barriers to Learning: cognition/insight  Access Code: [email or web address]  URL: [email or web address]  Date: 2022  Prepared by:    Exercises  Wrist AROM Flexion Extension - 1 x daily - 7 x weekly - 3 sets - 10 reps  Seated Forearm Pronation and Supination AROM - 1 x daily - 7 x weekly - 3 sets - 10 reps  Seated Elbow Flexion and Extension AROM - 1 x daily - 7 x weekly - 3 sets - 10 reps  Seated Shoulder Flexion - 1 x daily - 7 x weekly - 3 sets - 10 reps  Seated Shoulder Shrugs - 1 x daily - 7 x weekly - 3 sets - 10 reps  Seated Cervical Rotation AROM - 1 x daily - 7 x weekly - 3 sets - 10 reps  Seated Cervical Flexion AROM - 1 x daily - 7 x weekly - 3 sets - 10 reps  Seated Cervical Extension AROM - 1 x daily - 7 x weekly - 3 sets - 10 reps  Seated Heel Raise - 1 x daily - 7 x weekly - 3 sets - 10 reps  Seated Toe Raise - 1 x daily - 7 x weekly - 3 sets - 10 reps  Seated March - 1 x daily - 7 x weekly - 3 sets - 10 reps  Seated Long Arc Quad - 1 x daily - 7 x weekly - 3 sets - 10 reps    Patient Education  Understanding Energy Conservation  Energy Conservation During Daily Tasks  How to Prevent Falls    REQUIRES OT FOLLOW UP: Yes  Activity Tolerance  Activity Tolerance: Patient limited by fatigue;Treatment limited secondary to medical complications (increased HR)  Activity Tolerance: Pt limited by increased HR with minimal activity. HR 150s sitting EOB and went up/down throughout session, RN present and giving meds. Safety Devices  Safety Devices in place: Yes  Type of devices: All fall risk precautions in place; Left in chair;Nurse notified;Call light within reach; Chair alarm in place;Gait belt;Patient at risk for falls         Patient Diagnosis(es): The primary encounter diagnosis was General weakness. A diagnosis of Atrial fibrillation, unspecified type [facility]) was also pertinent to this visit. has a past medical history of Anemia, Atrial fibrillation (HCC), Atrophy of testis, Hyperlipidemia, Hypothyroidism, Osteoporosis, Overactive bladder, Palpitations, Prostate cancer ([address] Vitamin D deficiency, and Weight loss. has no past surgical history on file. Restrictions  Restrictions/Precautions  Restrictions/Precautions: General Precautions,Fall Risk,Up as Tolerated  Required Braces or Orthoses?: No  Position Activity Restriction  Other position/activity restrictions:  Up with assist, alarms, telemetry, AFIB/monitor HR, IV  Subjective   General  Chart Reviewed: Yes  Patient assessed for rehabilitation services?: Yes  Response to previous treatment: Patient with no complaints from previous session  Family / Caregiver Present: No  Subjective  Subjective: Pt in bed upon arrival. Pt stated \"I need rehab\"  General Comment  Comments: RN ok'd pt for therapy. Orientation  Orientation  Overall Orientation Status: Impaired  Objective    ADL  Grooming: Setup;Minimal assistance;Verbal cueing (for oral care)  UE Dressing: Moderate assistance;Verbal cueing (to change gowns)  LE Dressing: Dependent/Total (to change socks)        Balance  Sitting Balance: Stand by assistance  Standing Balance: Moderate assistance (x2)  Standing Balance  Time: ~1 min  Activity: transfer to chair  Bed mobility  Supine to Sit: Moderate assistance (with HOB up and max verbal/tactile cues for sequencing movements, use of bed rail, safety and line assist/awareness)  Transfers  Stand Step Transfers: Moderate assistance;2 Person assistance  Sit to stand: Moderate assistance;2 Person assistance  Stand to sit: Moderate assistance;2 Person assistance  Transfer Comments: VC for hand placement and tech. Stand step from EOB to chair with poor safety awareness, balance, strength, posture. Max VC for hand placement and lining up to surface before sitting with poor/no carryover. Cognition  Overall Cognitive Status: Exceptions  Arousal/Alertness: Appropriate responses to stimuli;Delayed responses to stimuli  Following Commands: Follows one step commands with repetition; Follows one step commands with increased time; Inconsistently follows commands  Attention Span: Attends with cues to redirect; Difficulty attending to directions; Difficulty dividing attention  Memory: Decreased recall of recent events;Decreased short term memory  Safety Judgement: Decreased awareness of need for assistance;Decreased awareness of need for safety  Problem Solving: Assistance required to generate solutions;Assistance required to implement solutions;Decreased awareness of errors;Assistance required to correct errors made;Assistance required to identify errors made  Insights: Decreased awareness of deficits  Initiation: Requires cues for all  Sequencing: Requires cues for all     Perception  Overall Perceptual Status: Impaired  Initiation: Cues to initiate tasks                                   Plan   Plan  Times per week: 4-5x/week, 1-2x/day  Specific instructions for Next Treatment: Educate on handouts  Current Treatment Recommendations: Cassi Bonilla Mobility Training,Endurance Training,Safety Education & Training,Self-Care / ADL,Patient/Caregiver Education & Training,Equipment Evaluation, Education, & procurement,Positioning,Cognitive/Perceptual Training                        AM-PAC Score        AM-PAC Inpatient Daily Activity Raw Score: 13 (03/04/22 0946)  AM-PAC Inpatient ADL T-Scale Score : 32.03 (03/04/22 0946)  ADL Inpatient CMS 0-100% Score: 63.03 (03/04/22 0946)  ADL Inpatient CMS G-Code Modifier : CL (03/04/22 0946)    Goals  Short term goals  Time Frame for Short term goals: by discharge, pt will  Short term goal 1: demo CGA with ADL transfers with good safety, AD/DME as needed  Short term goal 2: demo CGA with functional mob in room distances for ADL completion with AD/DME as needed and good safety  Short term goal 3: demo CGA with toileting routine with good safety/DME as needed  Short term goal 4: demo SBA with UB ADLs and min A LB ADLs with good safety/pacing and AE/DME as needed  Short term goal 5: demo and verb good understanding of fall prevention techs, EC/WS techs, equip needs, B UE HEP, and d/c recommendations  Patient Goals   Patient goals : to go home (states he will hire whatever help he needs)       Therapy Time   Individual Concurrent Group Co-treatment   Time In       [phone]   Time Out       0931   Minutes       24     Co-treatment with PT warranted secondary to decreased safety and independence requiring 2 skilled therapy professionals to address individual discipline's goals.  OT addressing \"preparation for ADL transfer\",\"sitting balance for increased ADL performance\",\"sitting/activity tolerance\",\"functional reaching\",\"environmental safety/scanning\",\"fall prevention\",\"functional mobility for ADL transfers\",\"ability to sequence and follow directions\",\"functional UE strength\".          CHOLO Be

## 2022-03-04 NOTE — PROGRESS NOTES
Physical Therapy  Facility/Department: [facility]  Daily Treatment Note  NAME: Terrance Pineda Dr Mir Fairchild  : 1931  MRN: [id number]    Date of Service: 3/4/2022    Discharge Recommendations:  Patient would benefit from continued therapy after discharge   Pt is currently functional below baseline and would suggest intense therapy post acute care. Would expect patient to be able to tolerate 3 hours of therapy per day and able to tolerate at least one hour up in chair. Please refer to AM-PAC score for current mobility/adl level. Assessment   Body structures, Functions, Activity limitations: Decreased functional mobility ; Decreased safe awareness;Decreased balance;Decreased cognition;Decreased strength;Decreased endurance  Assessment: Patient demo'd limited activity tolerance and only able to transfer to bedside recliner at this time d/t elevated HR. RN present to give morning meds to lower HR. Patient requires continued skilled PT services to address deficits regarding bed mobility, transfers, endurance, safety awareness and blalance. Prognosis: Good  Decision Making: Medium Complexity  PT Education: Goals;Transfer Training;PT Role;General Safety;Plan of Care;Gait Training  REQUIRES PT FOLLOW UP: Yes  Activity Tolerance  Activity Tolerance: Patient limited by fatigue;Patient limited by endurance     Patient Diagnosis(es): The primary encounter diagnosis was General weakness. A diagnosis of Atrial fibrillation, unspecified type [facility]) was also pertinent to this visit. has a past medical history of Anemia, Atrial fibrillation (HCC), Atrophy of testis, Hyperlipidemia, Hypothyroidism, Osteoporosis, Overactive bladder, Palpitations, Prostate cancer ([facility] [address] Vitamin D deficiency, and Weight loss. has no past surgical history on file.     Restrictions  Restrictions/Precautions  Restrictions/Precautions: General Precautions,Fall Risk,Up as Tolerated  Required Braces or Orthoses?: No  Position Activity Restriction  Other position/activity restrictions: Up with assist, alarms, telemetry, AFIB/monitor HR, IV  Subjective   General  Chart Reviewed: Yes  Response To Previous Treatment: Patient with no complaints from previous session. Family / Caregiver Present: No  Subjective  Subjective: Patient agreeable to work with therapy  General Comment  Comments: OK for PT per Noel Hinton RN          Orientation  Orientation  Overall Orientation Status: Within Functional Limits  Cognition   Cognition  Overall Cognitive Status: Exceptions  Arousal/Alertness: Appropriate responses to stimuli;Delayed responses to stimuli  Following Commands: Follows one step commands with repetition; Follows one step commands with increased time; Inconsistently follows commands  Attention Span: Attends with cues to redirect; Difficulty attending to directions; Difficulty dividing attention  Memory: Decreased recall of recent events;Decreased short term memory  Safety Judgement: Decreased awareness of need for assistance;Decreased awareness of need for safety  Problem Solving: Assistance required to generate solutions;Assistance required to implement solutions;Decreased awareness of errors;Assistance required to correct errors made;Assistance required to identify errors made  Insights: Decreased awareness of deficits  Initiation: Requires cues for all  Sequencing: Requires cues for all  Objective   Bed mobility  Supine to Sit: Moderate assistance  Sit to Supine: Moderate assistance  Scooting: Moderate assistance  Comment: Patient required MOD VCs/tactile assist for sequencing, initation, awareness/assistnace with line mgmt, self pacing, hand placement and use of bed rails to increase overall safety/decrease fall risk. Transfers  Sit to Stand: Moderate Assistance;2 Person Assistance  Stand to sit: Moderate Assistance;2 Person Assistance  Stand Pivot Transfers:  Moderate Assistance;2 Person Assistance  Comment: cues for correct hand placement, significant forward flexed posture, able to correct with cues  Ambulation  Ambulation?: Yes  More Ambulation?: No  Ambulation 1  Surface: level tile  Device: [address]: Moderate assistance;2 Person assistance  Gait Deviations: Slow Rocio;Decreased step length  Distance: 3 steps to chair only  Comments: Assistance for proper sit<-->stand and transfer, poor endurance, Increased HR with mobility, RN present to give morning medication to lower HR. Stairs/Curb  Stairs?: No     Balance  Posture: Fair  Sitting - Static: Good  Sitting - Dynamic: Good;-  Standing - Static: Fair  Standing - Dynamic: Fair;-  Comments: w/ RW for standing balance  Exercises  Comments: Pt performed seated LE ex's x 10 reps to promote mobility exercises and promote joint congruency, completed sit to stands x 5 for functional quad strengthening. Completed ex's with emphasis & incorporation of breathing techniques to keep SpO2 in safe range. Strength RLE  Comment: 4/5  Strength LLE  Comment: 4/5    AM-PAC Score  AM-PAC Inpatient Mobility Raw Score : 11 (03/04/22 1305)  AM-PAC Inpatient T-Scale Score : 33.86 (03/04/22 1305)  Mobility Inpatient CMS 0-100% Score: 72.57 (03/04/22 1305)  Mobility Inpatient CMS G-Code Modifier : CL (03/04/22 1305)          Goals  Short term goals  Time Frame for Short term goals: 12 visits  Short term goal 1: Independent bed mobility  Short term goal 2:  Independent sit<>stand  Short term goal 3: Patient to ambulate 100 feet with roll walker SBA  Short term goal 4: Patient to perform 25 minutes ther act to facilitate improving strength, endurance, balance  Short term goal 5: Good balance/posture  Patient Goals   Patient goals : to go home    Plan    Plan  Times per week: 1-2x/day for 5-6 days/week  Current Treatment Recommendations: Strengthening,Transfer Training,Endurance Training,Cognitive Reorientation,Patient/Caregiver Education & Training,Balance Training,Gait Training,Home Exercise Program,Safety Education & Training  Safety Devices  Type of devices: Left in chair,Chair alarm in place,Nurse notified,Gait belt,Call light within reach  Restraints  Initially in place: No     Therapy Time   Individual Concurrent Group Co-treatment   Time In       0907   Time Out       0931   Minutes       24        Co-treatment with OT warranted secondary to decreased safety and independence requiring 2 skilled therapy professionals to address individual discipline's goals. PT addressing pre gait trunk strengthening, weight shifting prior to transfers, transfer training and postural control in sitting/standing.     April Mccracken, PTA

## 2022-03-04 NOTE — PROGRESS NOTES
Warfarin Dosing - Pharmacy Consult Note  Consulting Provider: Johnson Daly  Indication:  Atrial Fibrillation  Warfarin Dose prior to admission: 4mg daily    Concurrent anticoagulants/antiplatelets: none   Significant Drug Interactions: No obvious interactions  Recent Labs     03/02/22  0331 03/03/22  0359 03/04/22  0526   INR 3.4 2.5 2.1     Recent warfarin administrations                     warfarin (COUMADIN) tablet 4 mg (mg) 4 mg Given 03/03/22 1833                   Date   INR    Dose  2/28       3.4         ?  3/1          3.1       none  3/2         3.4       none   3/3          2.5      4 mg  3/4         2.1   Assessment/Plan  (Goal INR: 2 - 3)  Coumadin 4mg again  today. Inr in am.     Active problem list reviewed. INR orders are placed. Chart reviewed for pertinent labs, drug/diet interactions, and past doses. Documentation of patient's clinical condition was reviewed.     Pharmacy Dosing:  Pharmacy will continue to follow.   '

## 2022-03-04 NOTE — PROGRESS NOTES
This RN informed pt that he would be going to CT tonight at approximately 2030 for ordered CT head. Pt refusing to go to CT tonight. This RN attempted to educate pt on importance of completed ordered tests in a timely manner. Pt  A&O x3, verbalizes understanding but continues to refuse.  Pt states he is agreeable to go for CT in the AM.

## 2022-03-08 ENCOUNTER — HOSPITAL ENCOUNTER (OUTPATIENT)
Age: 87
Setting detail: SPECIMEN
Discharge: HOME | End: 2022-03-08

## 2022-03-08 LAB
ABSOLUTE EOS #: 0.1 K/UL (ref 0–0.44)
ABSOLUTE IMMATURE GRANULOCYTE: 0.04 K/UL (ref 0–0.3)
ABSOLUTE LYMPH #: 0.94 K/UL (ref 1.1–3.7)
ABSOLUTE MONO #: 0.76 K/UL (ref 0.1–1.2)
ALBUMIN SERPL-MCNC: 3.2 G/DL (ref 3.5–5.2)
ALBUMIN/GLOBULIN RATIO: 1.3 (ref 1–2.5)
ALP BLD-CCNC: 135 U/L (ref 40–129)
ALT SERPL-CCNC: 46 U/L (ref 5–41)
ANION GAP SERPL CALCULATED.3IONS-SCNC: 16 MMOL/L (ref 9–17)
AST SERPL-CCNC: 46 U/L
BASOPHILS # BLD: 0 % (ref 0–2)
BASOPHILS ABSOLUTE: <0.03 K/UL (ref 0–0.2)
BILIRUB SERPL-MCNC: 1.04 MG/DL (ref 0.3–1.2)
BUN BLDV-MCNC: 24 MG/DL (ref 8–23)
CALCIUM SERPL-MCNC: 8.7 MG/DL (ref 8.6–10.4)
CHLORIDE BLD-SCNC: 105 MMOL/L (ref 98–107)
CO2: 21 MMOL/L (ref 20–31)
CREAT SERPL-MCNC: 0.58 MG/DL (ref 0.7–1.2)
EOSINOPHILS RELATIVE PERCENT: 1 % (ref 1–4)
GFR AFRICAN AMERICAN: >60 ML/MIN
GFR NON-AFRICAN AMERICAN: >60 ML/MIN
GFR SERPL CREATININE-BSD FRML MDRD: ABNORMAL ML/MIN/{1.73_M2}
GLUCOSE BLD-MCNC: 92 MG/DL (ref 70–99)
HCT VFR BLD CALC: 34 % (ref 40.7–50.3)
HEMOGLOBIN: 10 G/DL (ref 13–17)
IMMATURE GRANULOCYTES: 1 %
LYMPHOCYTES # BLD: 11 % (ref 24–43)
MCH RBC QN AUTO: 24.8 PG (ref 25.2–33.5)
MCHC RBC AUTO-ENTMCNC: 29.4 G/DL (ref 28.4–34.8)
MCV RBC AUTO: 84.4 FL (ref 82.6–102.9)
MONOCYTES # BLD: 9 % (ref 3–12)
NRBC AUTOMATED: 0 PER 100 WBC
PDW BLD-RTO: 18.9 % (ref 11.8–14.4)
PLATELET # BLD: 266 K/UL (ref 138–453)
PMV BLD AUTO: 10.6 FL (ref 8.1–13.5)
POTASSIUM SERPL-SCNC: 4.3 MMOL/L (ref 3.7–5.3)
RBC # BLD: 4.03 M/UL (ref 4.21–5.77)
RBC # BLD: ABNORMAL 10*6/UL
SEG NEUTROPHILS: 79 % (ref 36–65)
SEGMENTED NEUTROPHILS ABSOLUTE COUNT: 6.94 K/UL (ref 1.5–8.1)
SODIUM BLD-SCNC: 142 MMOL/L (ref 135–144)
TOTAL PROTEIN: 5.6 G/DL (ref 6.4–8.3)
WBC # BLD: 8.8 K/UL (ref 3.5–11.3)

## 2022-03-08 PROCEDURE — P9603 ONE-WAY ALLOW PRORATED MILES: HCPCS

## 2022-03-08 PROCEDURE — 36415 COLL VENOUS BLD VENIPUNCTURE: CPT

## 2022-03-08 PROCEDURE — 80053 COMPREHEN METABOLIC PANEL: CPT

## 2022-03-08 PROCEDURE — 85025 COMPLETE CBC W/AUTO DIFF WBC: CPT

## 2022-03-10 NOTE — PROGRESS NOTES
Physician Progress Note      Juan Carlos Jarvis  CSN #:                  [id number]  :                       1931  ADMIT DATE:       2022 7:09 PM  [facility] [place] DATE:        3/4/2022 4:10 PM  RESPONDING  PROVIDER #:        Stephanie Sullvian MD          QUERY TEXT:    Patient admitted with weakness, dehydration, noted to have longstanding   persistent atrial fibrillation. and is maintained on coumadin. If possible,   please document in progress notes and discharge summary if you are evaluating   and/or treating any of the following: The medical record reflects the following:  Risk Factors: longstanding persistent atrial fib, coumadin , 80years of age  Clinical Indicators: PT  33.0, 3/1 31.0, 3/2 33.0, 3/3 26.1, 3/4 23.1  Treatment: pharmacy dose coumadin, daily labs  Options provided:  -- Secondary hypercoagulable state in a patient with atrial fibrillation  -- Other - I will add my own diagnosis  -- Disagree - Not applicable / Not valid  -- Disagree - Clinically unable to determine / Unknown  -- Refer to Clinical Documentation Reviewer    PROVIDER RESPONSE TEXT:    This patient has secondary hypercoagulable state related to atrial   fibrillation.     Query created by: Sarai Bullard on 3/10/2022 7:53 AM      Electronically signed by:  Stephanie Sullivan MD 3/10/2022 1:28 PM

## 2022-03-15 ENCOUNTER — HOSPITAL ENCOUNTER (OUTPATIENT)
Age: 87
Setting detail: SPECIMEN
Discharge: HOME | End: 2022-03-15

## 2022-03-15 LAB
-: ABNORMAL
AMORPHOUS: ABNORMAL
ANION GAP SERPL CALCULATED.3IONS-SCNC: 13 MMOL/L (ref 9–17)
BACTERIA: ABNORMAL
BILIRUBIN URINE: NEGATIVE
BUN BLDV-MCNC: 22 MG/DL (ref 8–23)
CALCIUM SERPL-MCNC: 8.3 MG/DL (ref 8.6–10.4)
CHLORIDE BLD-SCNC: 104 MMOL/L (ref 98–107)
CO2: 23 MMOL/L (ref 20–31)
COLOR: YELLOW
CREAT SERPL-MCNC: 0.62 MG/DL (ref 0.7–1.2)
CRYSTALS, UA: ABNORMAL /HPF
EPITHELIAL CELLS UA: ABNORMAL /HPF (ref 0–5)
GFR AFRICAN AMERICAN: >60 ML/MIN
GFR NON-AFRICAN AMERICAN: >60 ML/MIN
GFR SERPL CREATININE-BSD FRML MDRD: ABNORMAL ML/MIN/{1.73_M2}
GLUCOSE BLD-MCNC: 116 MG/DL (ref 70–99)
GLUCOSE URINE: NEGATIVE
HCT VFR BLD CALC: 31.3 % (ref 40.7–50.3)
HEMOGLOBIN: 9.6 G/DL (ref 13–17)
KETONES, URINE: NEGATIVE
LEUKOCYTE ESTERASE, URINE: NEGATIVE
MCH RBC QN AUTO: 25.9 PG (ref 25.2–33.5)
MCHC RBC AUTO-ENTMCNC: 30.7 G/DL (ref 28.4–34.8)
MCV RBC AUTO: 84.4 FL (ref 82.6–102.9)
NITRITE, URINE: NEGATIVE
NRBC AUTOMATED: 0 PER 100 WBC
PDW BLD-RTO: 21.2 % (ref 11.8–14.4)
PH UA: 7.5 (ref 5–8)
PLATELET # BLD: 231 K/UL (ref 138–453)
PMV BLD AUTO: 10.5 FL (ref 8.1–13.5)
POTASSIUM SERPL-SCNC: 3.4 MMOL/L (ref 3.7–5.3)
PROTEIN UA: NEGATIVE
RBC # BLD: 3.71 M/UL (ref 4.21–5.77)
RBC UA: ABNORMAL /HPF (ref 0–2)
SODIUM BLD-SCNC: 140 MMOL/L (ref 135–144)
SPECIFIC GRAVITY UA: 1.01 (ref 1–1.03)
TURBIDITY: ABNORMAL
URINE HGB: NEGATIVE
UROBILINOGEN, URINE: NORMAL
WBC # BLD: 6.4 K/UL (ref 3.5–11.3)
WBC UA: ABNORMAL /HPF (ref 0–5)

## 2022-03-15 PROCEDURE — 81001 URINALYSIS AUTO W/SCOPE: CPT

## 2022-03-15 PROCEDURE — 80048 BASIC METABOLIC PNL TOTAL CA: CPT

## 2022-03-15 PROCEDURE — 87186 SC STD MICRODIL/AGAR DIL: CPT

## 2022-03-15 PROCEDURE — 87077 CULTURE AEROBIC IDENTIFY: CPT

## 2022-03-15 PROCEDURE — 36415 COLL VENOUS BLD VENIPUNCTURE: CPT

## 2022-03-15 PROCEDURE — 85027 COMPLETE CBC AUTOMATED: CPT

## 2022-03-15 PROCEDURE — 87086 URINE CULTURE/COLONY COUNT: CPT

## 2022-03-15 PROCEDURE — P9603 ONE-WAY ALLOW PRORATED MILES: HCPCS

## 2022-03-15 NOTE — PROGRESS NOTES
Physician Progress Note      Juanita Gaitan  CSN #:                  [id number]  :                       1931  ADMIT DATE:       2022 7:09 PM  [facility] DATE:        3/4/2022 4:10 PM  RESPONDING  PROVIDER #:        Cipriano Mayfield MD          QUERY TEXT:    Patient admitted with A fib, Dehydration. If possible, please document in   progress notes and discharge summary if you are evaluating and /or treating   any of the following: The medical record reflects the following:  Risk Factors:  poor intake, weight loss ,  Acute Illness  Clinical Indicators: BMI 26, Positive Nutrition Screen 2-13 lb weight loss,   decrease appetite. pt reports being unable to walk due to weakness and   needing nutrition/fluids. Inadequate protein-energy intake related to   inadequate protein-energy intake as evidenced by poor intake prior to   admission, weight loss, weight loss 7.5% in 3 months  Treatment: nutritional consult,  monitor po intakes. ASPEN Criteria:    https://aspenjournals. onlinelibrary. garcía. com/doi/full/10.1177/846193642666253    Options provided:  -- Protein calorie malnutrition mild  -- Protein calorie malnutrition moderate  -- Protein calorie malnutrition severe  -- Other - I will add my own diagnosis  -- Disagree - Not applicable / Not valid  -- Disagree - Clinically unable to determine / Unknown  -- Refer to Clinical Documentation Reviewer    PROVIDER RESPONSE TEXT:    This patient has moderate protein calorie malnutrition.     Query created by: Sabina Rocha on 3/11/2022 12:20 PM      Electronically signed by:  Cipriano Mayfield MD 3/15/2022 8:18 AM

## 2022-03-18 LAB
CULTURE: ABNORMAL
CULTURE: ABNORMAL
Lab: ABNORMAL
SPECIMEN DESCRIPTION: ABNORMAL

## 2022-03-28 ENCOUNTER — APPOINTMENT (OUTPATIENT)
Dept: CT IMAGING | Age: 87
DRG: 389 | End: 2022-03-28
Payer: MEDICARE

## 2022-03-28 ENCOUNTER — HOSPITAL ENCOUNTER (INPATIENT)
Age: 87
LOS: 4 days | Discharge: HOME HEALTH | DRG: 389 | End: 2022-04-01
Attending: EMERGENCY MEDICINE | Admitting: INTERNAL MEDICINE
Payer: MEDICARE

## 2022-03-28 DIAGNOSIS — K52.9 ACUTE COLITIS: Primary | ICD-10-CM

## 2022-03-28 DIAGNOSIS — K59.00 CONSTIPATION, UNSPECIFIED CONSTIPATION TYPE: ICD-10-CM

## 2022-03-28 LAB
ABSOLUTE EOS #: 0.07 K/UL (ref 0–0.4)
ABSOLUTE IMMATURE GRANULOCYTE: 0 K/UL (ref 0–0.3)
ABSOLUTE LYMPH #: 1.12 K/UL (ref 1–4.8)
ABSOLUTE MONO #: 0.77 K/UL (ref 0.2–0.8)
ALBUMIN SERPL-MCNC: 2.9 G/DL (ref 3.5–5.2)
ALP BLD-CCNC: 125 U/L (ref 40–129)
ALT SERPL-CCNC: 19 U/L (ref 5–41)
ANION GAP SERPL CALCULATED.3IONS-SCNC: 10 MMOL/L (ref 9–17)
AST SERPL-CCNC: 25 U/L
BASOPHILS # BLD: 0 %
BASOPHILS ABSOLUTE: 0 K/UL (ref 0–0.2)
BILIRUB SERPL-MCNC: 0.97 MG/DL (ref 0.3–1.2)
BILIRUBIN URINE: NEGATIVE
BUN BLDV-MCNC: 24 MG/DL (ref 8–23)
BUN/CREAT BLD: 24 (ref 9–20)
CALCIUM SERPL-MCNC: 8.9 MG/DL (ref 8.6–10.4)
CHLORIDE BLD-SCNC: 98 MMOL/L (ref 98–107)
CO2: 26 MMOL/L (ref 20–31)
COLOR: YELLOW
CREAT SERPL-MCNC: 1.01 MG/DL (ref 0.7–1.2)
EOSINOPHILS RELATIVE PERCENT: 1 % (ref 1–4)
GFR AFRICAN AMERICAN: >60 ML/MIN
GFR NON-AFRICAN AMERICAN: >60 ML/MIN
GFR SERPL CREATININE-BSD FRML MDRD: ABNORMAL ML/MIN/{1.73_M2}
GLUCOSE BLD-MCNC: 111 MG/DL (ref 70–99)
GLUCOSE URINE: NEGATIVE
HCT VFR BLD CALC: 33.7 % (ref 40.7–50.3)
HEMOGLOBIN: 10.5 G/DL (ref 13–17)
IMMATURE GRANULOCYTES: 0 %
KETONES, URINE: NEGATIVE
LEUKOCYTE ESTERASE, URINE: NEGATIVE
LYMPHOCYTES # BLD: 16 % (ref 24–44)
MAGNESIUM: 2 MG/DL (ref 1.6–2.6)
MCH RBC QN AUTO: 25.5 PG (ref 25.2–33.5)
MCHC RBC AUTO-ENTMCNC: 31.2 G/DL (ref 28.4–34.8)
MCV RBC AUTO: 81.8 FL (ref 82.6–102.9)
MONOCYTES # BLD: 11 % (ref 1–7)
MORPHOLOGY: ABNORMAL
MORPHOLOGY: ABNORMAL
NITRITE, URINE: NEGATIVE
NRBC AUTOMATED: 0 PER 100 WBC
PDW BLD-RTO: 22.7 % (ref 11.8–14.4)
PH UA: 7.5 (ref 5–8)
PLATELET # BLD: 255 K/UL (ref 138–453)
PMV BLD AUTO: 9.3 FL (ref 8.1–13.5)
POTASSIUM SERPL-SCNC: 3.3 MMOL/L (ref 3.7–5.3)
PROTEIN UA: NEGATIVE
RBC # BLD: 4.12 M/UL (ref 4.21–5.77)
SEG NEUTROPHILS: 72 % (ref 36–66)
SEGMENTED NEUTROPHILS ABSOLUTE COUNT: 5.04 K/UL (ref 1.8–7.7)
SODIUM BLD-SCNC: 134 MMOL/L (ref 135–144)
SPECIFIC GRAVITY UA: 1.01 (ref 1–1.03)
TOTAL PROTEIN: 6.2 G/DL (ref 6.4–8.3)
TURBIDITY: CLEAR
URINE HGB: NEGATIVE
UROBILINOGEN, URINE: NORMAL
WBC # BLD: 7 K/UL (ref 3.5–11.3)

## 2022-03-28 PROCEDURE — 2060000000 HC ICU INTERMEDIATE R&B

## 2022-03-28 PROCEDURE — 83735 ASSAY OF MAGNESIUM: CPT

## 2022-03-28 PROCEDURE — 85025 COMPLETE CBC W/AUTO DIFF WBC: CPT

## 2022-03-28 PROCEDURE — 1200000000 HC SEMI PRIVATE

## 2022-03-28 PROCEDURE — 99285 EMERGENCY DEPT VISIT HI MDM: CPT

## 2022-03-28 PROCEDURE — 80053 COMPREHEN METABOLIC PANEL: CPT

## 2022-03-28 PROCEDURE — 51702 INSERT TEMP BLADDER CATH: CPT

## 2022-03-28 PROCEDURE — 74176 CT ABD & PELVIS W/O CONTRAST: CPT

## 2022-03-28 PROCEDURE — 6370000000 HC RX 637 (ALT 250 FOR IP): Performed by: EMERGENCY MEDICINE

## 2022-03-28 PROCEDURE — 81003 URINALYSIS AUTO W/O SCOPE: CPT

## 2022-03-28 RX ORDER — SODIUM PHOSPHATE, DIBASIC AND SODIUM PHOSPHATE, MONOBASIC 7; 19 G/133ML; G/133ML
1 ENEMA RECTAL
Status: COMPLETED | OUTPATIENT
Start: 2022-03-28 | End: 2022-03-28

## 2022-03-28 RX ADMIN — SODIUM PHOSPHATE, DIBASIC AND SODIUM PHOSPHATE, MONOBASIC 1 ENEMA: 7; 19 ENEMA RECTAL at 22:53

## 2022-03-29 PROBLEM — E44.0 MODERATE MALNUTRITION (HCC): Status: ACTIVE | Noted: 2022-03-29

## 2022-03-29 PROBLEM — N13.30 BILATERAL HYDRONEPHROSIS: Status: ACTIVE | Noted: 2022-03-29

## 2022-03-29 PROBLEM — R33.8 ACUTE URINARY RETENTION: Status: ACTIVE | Noted: 2022-03-29

## 2022-03-29 LAB
INR BLD: 6.7
PROCALCITONIN: 0.08 NG/ML
PROTHROMBIN TIME: 58.2 SEC (ref 11.5–14.2)
TSH SERPL DL<=0.05 MIU/L-ACNC: 2.67 UIU/ML (ref 0.3–5)

## 2022-03-29 PROCEDURE — 99222 1ST HOSP IP/OBS MODERATE 55: CPT | Performed by: INTERNAL MEDICINE

## 2022-03-29 PROCEDURE — 97163 PT EVAL HIGH COMPLEX 45 MIN: CPT

## 2022-03-29 PROCEDURE — 97116 GAIT TRAINING THERAPY: CPT

## 2022-03-29 PROCEDURE — 1200000000 HC SEMI PRIVATE

## 2022-03-29 PROCEDURE — 84443 ASSAY THYROID STIM HORMONE: CPT

## 2022-03-29 PROCEDURE — 97112 NEUROMUSCULAR REEDUCATION: CPT

## 2022-03-29 PROCEDURE — 97167 OT EVAL HIGH COMPLEX 60 MIN: CPT

## 2022-03-29 PROCEDURE — 6360000002 HC RX W HCPCS: Performed by: NURSE PRACTITIONER

## 2022-03-29 PROCEDURE — 97530 THERAPEUTIC ACTIVITIES: CPT

## 2022-03-29 PROCEDURE — 6370000000 HC RX 637 (ALT 250 FOR IP): Performed by: CLINICAL NURSE SPECIALIST

## 2022-03-29 PROCEDURE — 85610 PROTHROMBIN TIME: CPT

## 2022-03-29 PROCEDURE — 36415 COLL VENOUS BLD VENIPUNCTURE: CPT

## 2022-03-29 PROCEDURE — 2580000003 HC RX 258: Performed by: CLINICAL NURSE SPECIALIST

## 2022-03-29 PROCEDURE — APPSS30 APP SPLIT SHARED TIME 16-30 MINUTES: Performed by: NURSE PRACTITIONER

## 2022-03-29 PROCEDURE — 97535 SELF CARE MNGMENT TRAINING: CPT

## 2022-03-29 PROCEDURE — 84145 PROCALCITONIN (PCT): CPT

## 2022-03-29 RX ORDER — ONDANSETRON 4 MG/1
4 TABLET, ORALLY DISINTEGRATING ORAL EVERY 8 HOURS PRN
Status: DISCONTINUED | OUTPATIENT
Start: 2022-03-29 | End: 2022-04-01 | Stop reason: HOSPADM

## 2022-03-29 RX ORDER — POLYETHYLENE GLYCOL 3350 17 G/17G
17 POWDER, FOR SOLUTION ORAL 2 TIMES DAILY
Status: DISCONTINUED | OUTPATIENT
Start: 2022-03-29 | End: 2022-04-01 | Stop reason: HOSPADM

## 2022-03-29 RX ORDER — POTASSIUM CHLORIDE 20 MEQ/1
40 TABLET, EXTENDED RELEASE ORAL PRN
Status: DISCONTINUED | OUTPATIENT
Start: 2022-03-29 | End: 2022-04-01 | Stop reason: HOSPADM

## 2022-03-29 RX ORDER — LANOLIN ALCOHOL/MO/W.PET/CERES
325 CREAM (GRAM) TOPICAL 2 TIMES DAILY WITH MEALS
COMMUNITY

## 2022-03-29 RX ORDER — ONDANSETRON 2 MG/ML
4 INJECTION INTRAMUSCULAR; INTRAVENOUS EVERY 6 HOURS PRN
Status: DISCONTINUED | OUTPATIENT
Start: 2022-03-29 | End: 2022-04-01 | Stop reason: HOSPADM

## 2022-03-29 RX ORDER — FUROSEMIDE 10 MG/ML
40 INJECTION INTRAMUSCULAR; INTRAVENOUS DAILY
Status: DISCONTINUED | OUTPATIENT
Start: 2022-03-29 | End: 2022-03-31

## 2022-03-29 RX ORDER — LEVOFLOXACIN 5 MG/ML
750 INJECTION, SOLUTION INTRAVENOUS
Status: DISCONTINUED | OUTPATIENT
Start: 2022-03-29 | End: 2022-03-30

## 2022-03-29 RX ORDER — POTASSIUM CHLORIDE 7.45 MG/ML
10 INJECTION INTRAVENOUS PRN
Status: DISCONTINUED | OUTPATIENT
Start: 2022-03-29 | End: 2022-04-01 | Stop reason: HOSPADM

## 2022-03-29 RX ORDER — AMOXICILLIN 250 MG
1 CAPSULE ORAL 2 TIMES DAILY
COMMUNITY

## 2022-03-29 RX ORDER — ACETAMINOPHEN 650 MG/1
650 SUPPOSITORY RECTAL EVERY 6 HOURS PRN
Status: DISCONTINUED | OUTPATIENT
Start: 2022-03-29 | End: 2022-04-01 | Stop reason: HOSPADM

## 2022-03-29 RX ORDER — SODIUM CHLORIDE 0.9 % (FLUSH) 0.9 %
10 SYRINGE (ML) INJECTION PRN
Status: DISCONTINUED | OUTPATIENT
Start: 2022-03-29 | End: 2022-04-01 | Stop reason: HOSPADM

## 2022-03-29 RX ORDER — MAGNESIUM SULFATE 1 G/100ML
1000 INJECTION INTRAVENOUS PRN
Status: DISCONTINUED | OUTPATIENT
Start: 2022-03-29 | End: 2022-04-01 | Stop reason: HOSPADM

## 2022-03-29 RX ORDER — DILTIAZEM HYDROCHLORIDE 240 MG/1
240 CAPSULE, COATED, EXTENDED RELEASE ORAL DAILY
Status: DISCONTINUED | OUTPATIENT
Start: 2022-03-29 | End: 2022-04-01 | Stop reason: HOSPADM

## 2022-03-29 RX ORDER — SODIUM CHLORIDE 9 MG/ML
INJECTION, SOLUTION INTRAVENOUS PRN
Status: DISCONTINUED | OUTPATIENT
Start: 2022-03-29 | End: 2022-04-01 | Stop reason: HOSPADM

## 2022-03-29 RX ORDER — POTASSIUM CHLORIDE 1500 MG/1
20 TABLET, FILM COATED, EXTENDED RELEASE ORAL DAILY
COMMUNITY

## 2022-03-29 RX ORDER — OXYBUTYNIN CHLORIDE 5 MG/1
5 TABLET ORAL DAILY
Status: DISCONTINUED | OUTPATIENT
Start: 2022-03-29 | End: 2022-04-01 | Stop reason: HOSPADM

## 2022-03-29 RX ORDER — WARFARIN SODIUM 2 MG/1
4 TABLET ORAL DAILY
Status: DISCONTINUED | OUTPATIENT
Start: 2022-03-29 | End: 2022-03-29

## 2022-03-29 RX ORDER — SODIUM CHLORIDE 0.9 % (FLUSH) 0.9 %
5-40 SYRINGE (ML) INJECTION EVERY 12 HOURS SCHEDULED
Status: DISCONTINUED | OUTPATIENT
Start: 2022-03-29 | End: 2022-04-01 | Stop reason: HOSPADM

## 2022-03-29 RX ORDER — FUROSEMIDE 40 MG/1
40 TABLET ORAL DAILY
COMMUNITY

## 2022-03-29 RX ORDER — POLYETHYLENE GLYCOL 3350 17 G/17G
17 POWDER, FOR SOLUTION ORAL 2 TIMES DAILY
Status: DISCONTINUED | OUTPATIENT
Start: 2022-03-29 | End: 2022-03-29

## 2022-03-29 RX ORDER — ACETAMINOPHEN 325 MG/1
650 TABLET ORAL EVERY 6 HOURS PRN
Status: DISCONTINUED | OUTPATIENT
Start: 2022-03-29 | End: 2022-04-01 | Stop reason: HOSPADM

## 2022-03-29 RX ADMIN — DILTIAZEM HYDROCHLORIDE 240 MG: 240 CAPSULE, COATED, EXTENDED RELEASE ORAL at 12:48

## 2022-03-29 RX ADMIN — SODIUM CHLORIDE 10 ML/HR: 9 INJECTION, SOLUTION INTRAVENOUS at 02:22

## 2022-03-29 RX ADMIN — SODIUM CHLORIDE, PRESERVATIVE FREE 10 ML: 5 INJECTION INTRAVENOUS at 21:27

## 2022-03-29 RX ADMIN — SODIUM CHLORIDE, PRESERVATIVE FREE 10 ML: 5 INJECTION INTRAVENOUS at 11:08

## 2022-03-29 RX ADMIN — FUROSEMIDE 40 MG: 10 INJECTION, SOLUTION INTRAMUSCULAR; INTRAVENOUS at 11:08

## 2022-03-29 RX ADMIN — POTASSIUM CHLORIDE 40 MEQ: 20 TABLET, EXTENDED RELEASE ORAL at 11:08

## 2022-03-29 RX ADMIN — LEVOFLOXACIN 750 MG: 5 INJECTION, SOLUTION INTRAVENOUS at 02:23

## 2022-03-29 RX ADMIN — POLYETHYLENE GLYCOL 3350 17 G: 17 POWDER, FOR SOLUTION ORAL at 11:08

## 2022-03-29 RX ADMIN — POLYETHYLENE GLYCOL 3350 17 G: 17 POWDER, FOR SOLUTION ORAL at 21:24

## 2022-03-29 ASSESSMENT — ENCOUNTER SYMPTOMS
SHORTNESS OF BREATH: 0
DIARRHEA: 0
ABDOMINAL DISTENTION: 1
SINUS PAIN: 0
CHEST TIGHTNESS: 0
ABDOMINAL PAIN: 0
APNEA: 0
CONSTIPATION: 1
COLOR CHANGE: 0
VOMITING: 0
COUGH: 0
NAUSEA: 0
BLOOD IN STOOL: 0
EYES NEGATIVE: 1

## 2022-03-29 ASSESSMENT — PAIN SCALES - GENERAL
PAINLEVEL_OUTOF10: 0
PAINLEVEL_OUTOF10: 0

## 2022-03-29 NOTE — PROGRESS NOTES
Report called to Med/surg, computers were down so medications were not administered prior to transport and transfer in system. Patient VSS, transfer explained, and patient in route via bed.

## 2022-03-29 NOTE — PROGRESS NOTES
Occupational Therapy   Occupational Therapy Initial Assessment  Date: 3/29/2022   Patient Name: Nancy Monroy  MRN: [id number]     : 1931    RN Jared Grimes reports patient is medically stable for therapy treatment this date. Chart reviewed prior to treatment and patient is agreeable for therapy. All lines intact and patient positioned comfortably at end of treatment. All patient needs addressed prior to ending therapy session. Pt currently functioning below baseline. Would suggest additional therapy at time of discharge to maximize long term outcomes and prevent re-admission. Please refer to AM-PAC score for current level of function. Date of Service: 3/29/2022    Discharge Recommendations:  Patient would benefit from continued therapy after discharge  OT Equipment Recommendations  Equipment Needed: Yes  Mobility Devices: ADL Assistive Devices  ADL Assistive Devices: Grab Bars - shower; Reacher;Sock-Aid Soft;Long-handled Shoe Horn;Long-handled Sponge;Emergency Alert System    Assessment   Performance deficits / Impairments: Decreased functional mobility ; Decreased safe awareness;Decreased balance;Decreased coordination;Decreased ADL status; Decreased cognition;Decreased vision/visual deficit; Decreased posture;Decreased endurance;Decreased high-level IADLs;Decreased strength  Assessment: At this time, pt requires assist of 2 staff when up in function with AD and is a high fall risk. Skilled OT is indicated for maximizing functional I and safety as able to reduce caregiver assist/burden.   Prognosis: Fair  Decision Making: High Complexity  OT Education: Orientation;OT Role;Plan of Care;Energy Conservation;Transfer Training  Patient Education: safety in function, call light use/fall prevention, proper bed mob tech, pursed lip breathing, recommendations for continued therapy/benefits of being up OOB, postural control, edema mgt tech  Barriers to Learning: memory and cognitive deficits  REQUIRES OT FOLLOW UP: Yes  Activity Tolerance  Activity Tolerance: Patient limited by fatigue;Treatment limited secondary to decreased cognition  Activity Tolerance: poor plus and pt fatigues easily  Safety Devices  Safety Devices in place: Yes  Type of devices: Call light within reach; Chair alarm in place; Left in chair;Patient at risk for falls;Gait belt;Nurse notified (BLE's elevated on stool/pillow under; pt was edu on benefits of elevation and B ankle AROM/pumping as able to reduce swelling. Pt with fair understanding and question carry over.)           Patient Diagnosis(es): The primary encounter diagnosis was Acute colitis. A diagnosis of Constipation, unspecified constipation type was also pertinent to this visit. has a past medical history of Anemia, Atrial fibrillation (HCC), Atrophy of testis, Hyperlipidemia, Hypothyroidism, Osteoporosis, Overactive bladder, Palpitations, Prostate cancer ([address] Vitamin D deficiency, and Weight loss. has a past surgical history that includes hernia repair (Right). PER H&P: Miko Rodrigez Nurse is a 80 y.o. Non- / non  male who presents with Abdominal Pain (raised ball on abdomen, no pain)   and is admitted to the hospital for the management of Colitis     Restrictions  Restrictions/Precautions  Restrictions/Precautions: General Precautions,Fall Risk  Position Activity Restriction  Other position/activity restrictions:  Up with assist, clear liquid diet, heels off bed at all times, RUE IV, catheter, alarms    Subjective   General  Chart Reviewed: Yes  Patient assessed for rehabilitation services?: Yes  Family / Caregiver Present:  (Mai-states she was pt's )  Patient Currently in Pain: Denies    Social/Functional History  Social/Functional History  Lives With: Alone  Type of Home: House  Home Layout: One level  Home Access: Stairs to enter without rails  Entrance Stairs - Number of Steps: 2  Bathroom Shower/Tub: Walk-in shower (has small lip to enter)  Bathroom tasks  Functional Mobility  Functional - Mobility Device: Rolling Walker  Activity:  (bed to door and back to bedside chair with increased time)  Assist Level: Moderate assistance (x2 for safety/balance support)  Functional Mobility Comments: MAX cues/tactile assist and demo for upright posture, looking up and scanning room, RW safety/mgt and staying inside AD and keeping close to body, pacing, pursed lip breathing, weight shifting and awareness/assist with lines to increase safety/reduce fall risk. Toilet Transfers  Toilet Transfers Comments: N/T as pt has mathews  ADL  Feeding: Setup  Grooming: Setup/Minimal assistance seated  UE Bathing: Setup/SBA  LE Bathing: Dependent/Total   UE Dressing: Mod assistance donning 2nd hosp gown as a robe   LE Dressing: Maximum assistance;Dependent/Total (max assist for B socks supine in bed; x2 staff to stand with RW for any brief/clothing mgt)  Toileting: Dependent/Total (mathews)   *pt is DEP when up with RW and 2 staff assist needed for safety/balnce support in function. TONE BUE's: Normotonic   *Pt with B FMC deficits and movements are not fluid and coordinated. Bed mobility  Supine to Sit: Moderate assistance;2 Person assistance  Sit to Supine: Unable to assess (pt agreed to sit up in chair after edu on benefits of being up OOB and secretay also in agreement of pt being up OOB)  Scooting: Moderate assistance;2 Person assistance  Comment: MAX cues/tactile assist for hand placement on bed rail, pursed lip breathing, proper log rolling/iniating movements, use of BUE's to assist with full scoot to EOB and place BLE's on floor as well as awareness/assist with lines to increase overall safety. Transfers  Stand Step Transfers: Moderate assistance;2 Person assistance (with RW)  Sit to stand:  Moderate assistance;2 Person assistance  Stand to sit: Moderate assistance;2 Person assistance  Transfer Comments: MAX cues/tactile assist and demo for B hand placement pushing from surface seated on as well as reaching back, upright posture, pacing, RW safety/mgt, scanning, pursed lip breathing, squaring self/AD up to surface prior to sitting and controlled as well as awareness/assist with lines to increase overall safety. Cognition  Overall Cognitive Status: Exceptions  Arousal/Alertness: Delayed responses to stimuli  Following Commands: Follows one step commands with increased time; Follows one step commands with repetition  Attention Span: Attends with cues to redirect  Memory: Decreased recall of recent events;Decreased short term memory  Safety Judgement: Decreased awareness of need for safety;Decreased awareness of need for assistance  Problem Solving: Assistance required to identify errors made;Assistance required to implement solutions;Assistance required to correct errors made;Assistance required to generate solutions  Insights: Decreased awareness of deficits  Initiation: Requires cues for all  Sequencing: Requires cues for some  Perception  Overall Perceptual Status: WFL     Sensation  Overall Sensation Status: WFL (pt denies any paresthesias in hands/feet)        LUE AROM (degrees)  LUE AROM : WFL  RUE AROM (degrees)  RUE AROM : WFL  LUE Strength  Gross LUE Strength: Exceptions to [facility]  LUE Strength Comment: BUE strength grossly 4/5  RUE Strength  Gross RUE Strength: Exceptions to [address] [place] [address]  Times per week: 4-5x/week 1x/day as kike  Current Treatment Recommendations: Strengthening,Balance Training,Functional Mobility Training,Safety Education & Susannah Gum Re-education,Patient/Caregiver Education & Training,Equipment Evaluation, Education, & procurement,Cognitive Reorientation,Self-Care / ADL,Cognitive/Perceptual Training                                                    AM-PAC Score   12    *Co-treatment with PT warranted secondary to decreased safety and independence requiring 2 skilled therapy professionals to

## 2022-03-29 NOTE — PROGRESS NOTES
Mechanical debridement with surgical resident at bedside. Small amount of soft brown stool with small amount of bleeding removed to aid in passing of stool.

## 2022-03-29 NOTE — FLOWSHEET NOTE
Patient receives Sacrament of the Sick (anointing) from [facility] .    Writer charting for [employer].       12/89/95 4889   Encounter Summary   Services provided to: Patient   Referral/Consult From: [address] [place] [address] Children;Home care staff   Continue Visiting   (3/29/22 Anointed)   Complexity of Encounter Low   Length of Encounter 15 minutes   Routine   Type Initial   Assessment Approachable   Sacraments   Sacrament of Sick-Anointing Anointed  (3/29/22 Fr. Trell Rashid)

## 2022-03-29 NOTE — ED NOTES
Fleet enema given to pt. Pt unable to hold enema in, draining on to salomon pad.       Bartolome Hyde, RN  03/28/22 4954

## 2022-03-29 NOTE — ED NOTES
Pt presents to ER for abdominal mass. Mass is in center of abdomen. Pt states his home health nurse noticed it on Friday and told him to go to ER. Pt denies any pain.       Melecio Edwards RN  03/28/22 7527

## 2022-03-29 NOTE — ED PROVIDER NOTES
EMERGENCY DEPARTMENT ENCOUNTER    Pt Name: Zamzam Velasquez  MRN: [id number]  Justingfshaheed 11/24/1931  Date of evaluation: 3/28/22  CHIEF COMPLAINT       Chief Complaint   Patient presents with    Abdominal Pain     raised ball on abdomen, no pain     HISTORY OF PRESENT ILLNESS   22-year-old male presents the emergency room for lack of bowel movement over the last 2 weeks. Patient has been on multiple medications at home to help with constipation including suppositories stool softener and milk of molasses. Patient is not having any abdominal pain but he has distention in the abdomen and caregivers are concerned about possible mass in the abdomen. REVIEW OF SYSTEMS     Review of Systems   Constitutional: Negative for activity change, chills and diaphoresis. HENT: Negative for congestion, sinus pain and tinnitus. Eyes: Negative. Respiratory: Negative for apnea, chest tightness and shortness of breath. Gastrointestinal: Positive for abdominal distention and constipation. Negative for diarrhea and vomiting. Genitourinary: Negative for difficulty urinating and frequency. Musculoskeletal: Negative for arthralgias and myalgias. Skin: Negative for color change and rash. Neurological: Negative for dizziness. Hematological: Negative. Psychiatric/Behavioral: Negative.         PASTMEDICAL HISTORY     Past Medical History:   Diagnosis Date    Anemia     Atrial fibrillation (HCC)     Atrophy of testis     Hyperlipidemia     Hypothyroidism     Osteoporosis     Overactive bladder     Palpitations     Prostate cancer ([address]     Vitamin D deficiency     Weight loss      Past Problem List  Patient Active Problem List   Diagnosis Code    Atrial fibrillation ([address] I48.91    Hypokalemia E87.6    Hypothyroidism E03.9    Overactive bladder N32.81    Elevated brain natriuretic peptide (BNP) level R79.89    General weakness R53.1    Chronic anticoagulation Z79.01    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  Pneumonia of right lower lobe due to infectious organism J18.9     SURGICAL HISTORY       Past Surgical History:   Procedure Laterality Date    HERNIA REPAIR Right      CURRENT MEDICATIONS       Current Discharge Medication List      CONTINUE these medications which have NOT CHANGED    Details   senna-docusate (PERICOLACE) 8.6-50 MG per tablet Take 1 tablet by mouth 2 times daily      furosemide (LASIX) 40 MG tablet Take 40 mg by mouth daily      potassium chloride (MICRO-K) 10 MEQ extended release capsule Take 20 mEq by mouth daily      ferrous sulfate (FE TABS 325) 325 (65 Fe) MG EC tablet Take 325 mg by mouth 2 times daily (with meals)      magnesium hydroxide (MILK OF MAGNESIA) 400 MG/5ML suspension Take 10 mLs by mouth daily as needed for Constipation      dilTIAZem (CARDIZEM CD) 240 MG extended release capsule Take 1 capsule by mouth daily  Qty: 30 capsule, Refills: 3      digoxin (LANOXIN) 125 MCG tablet Take 1 tablet by mouth daily  Qty: 30 tablet, Refills: 3      oxybutynin (DITROPAN) 5 MG tablet Take 5 mg by mouth daily       warfarin (COUMADIN) 4 MG tablet Take 4 mg by mouth daily           ALLERGIES     is allergic to lactose intolerance (gi) and sulfa antibiotics. FAMILY HISTORY     He indicated that his mother is . He indicated that his father is . SOCIAL HISTORY       Social History     Tobacco Use    Smoking status: Never Smoker    Smokeless tobacco: Never Used   Vaping Use    Vaping Use: Not on file   Substance Use Topics    Alcohol use: Never    Drug use: Never     PHYSICAL EXAM     INITIAL VITALS: BP (!) 140/63   Pulse 64   Temp 97.5 °F (36.4 °C) (Axillary)   Resp 18   Ht 5' 4\" (1.626 m)   Wt 162 lb (73.5 kg)   SpO2 96%   BMI 27.81 kg/m²    Physical Exam  Constitutional:       General: He is not in acute distress. Appearance: He is well-developed. HENT:      Head: Normocephalic. Eyes:      Pupils: Pupils are equal, round, and reactive to light. Cardiovascular:      Rate and Rhythm: Normal rate and regular rhythm. Heart sounds: Normal heart sounds. Pulmonary:      Effort: Pulmonary effort is normal. No respiratory distress. Breath sounds: Normal breath sounds. Abdominal:      General: Bowel sounds are decreased. There is distension. Palpations: Abdomen is soft. There is mass. Tenderness: There is no abdominal tenderness. Musculoskeletal:         General: Normal range of motion. Skin:     General: Skin is warm and dry. Neurological:      Mental Status: He is alert and oriented to person, place, and time. MEDICAL DECISION MAKIN-year-old male presenting to the emergency room with constipation and large amount of distention on physical exam.  He is not in any pain however has significant amount of stool burden with concern for stercoral colitis/ inflammatory changes of the bowel. He also has bilateral hydronephrosis with severe bladder distention on CT imaging. Reyes was placed here in the ED with large amount of urine flowing freely into the bag. Plan is for admission for bowel regimen. Case discussed with [facility] who will accept. CRITICAL CARE:       PROCEDURES:    Procedures    DIAGNOSTIC RESULTS   EKG:All EKG's are interpreted by the Emergency Department Physician who either signs or Co-signs this chart in the absence of a cardiologist.        RADIOLOGY:All plain film, CT, MRI, and formal ultrasound images (except ED bedside ultrasound) are read by the radiologist, see reports below, unless otherwisenoted in MDM or here. CT ABDOMEN PELVIS WO CONTRAST Additional Contrast? None   Final Result   1. Large amount of stool within the distal colon and rectum with wall   thickening, concerning for stercoral colitis. 2. Marked distention of the urinary bladder with bilateral   hydroureteronephrosis, concerning for urinary outlet obstruction. No   nephrolithiasis.    3. Bilateral pleural effusions with adjacent consolidation, atelectasis   versus pneumonia. LABS: All lab results were reviewed by myself, and all abnormals are listed below. Labs Reviewed   CBC WITH AUTO DIFFERENTIAL - Abnormal; Notable for the following components:       Result Value    RBC 4.12 (*)     Hemoglobin 10.5 (*)     Hematocrit 33.7 (*)     MCV 81.8 (*)     RDW 22.7 (*)     Seg Neutrophils 72 (*)     Lymphocytes 16 (*)     Monocytes 11 (*)     All other components within normal limits   COMPREHENSIVE METABOLIC PANEL W/ REFLEX TO MG FOR LOW K - Abnormal; Notable for the following components:    Glucose 111 (*)     BUN 24 (*)     Bun/Cre Ratio 24 (*)     Sodium 134 (*)     Potassium 3.3 (*)     Total Protein 6.2 (*)     Albumin 2.9 (*)     All other components within normal limits   MAGNESIUM   URINALYSIS WITH REFLEX TO CULTURE   PROTIME-INR   PROCALCITONIN   TSH WITH REFLEX       EMERGENCY DEPARTMENTCOURSE:         Vitals:    Vitals:    03/28/22 2039 03/29/22 0106   BP: 139/81 (!) 140/63   Pulse: 70 64   Resp: 18 18   Temp: 97.5 °F (36.4 °C) 97.5 °F (36.4 °C)   TempSrc: Oral Axillary   SpO2: 97% 96%   Weight: 162 lb (73.5 kg)    Height: 5' 4\" (1.626 m)        The patient was given the following medications while in the emergency department:  Orders Placed This Encounter   Medications    fleet rectal enema 1 enema    dilTIAZem (CARDIZEM CD) extended release capsule 240 mg    oxybutynin (DITROPAN) tablet 5 mg    warfarin (COUMADIN) tablet 4 mg     Order Specific Question:   What is the patient's goal INR?      Answer:   2.0 - 3.0    sodium chloride flush 0.9 % injection 5-40 mL    sodium chloride flush 0.9 % injection 10 mL    0.9 % sodium chloride infusion    OR Linked Order Group     potassium chloride (KLOR-CON M) extended release tablet 40 mEq     potassium bicarb-citric acid (EFFER-K) effervescent tablet 40 mEq     potassium chloride 10 mEq/100 mL IVPB (Peripheral Line)    magnesium sulfate 1000 mg in dextrose 5% 100 mL IVPB    OR Linked Order Group     ondansetron (ZOFRAN-ODT) disintegrating tablet 4 mg     ondansetron (ZOFRAN) injection 4 mg    DISCONTD: polyethylene glycol (GLYCOLAX) packet 17 g    OR Linked Order Group     acetaminophen (TYLENOL) tablet 650 mg     acetaminophen (TYLENOL) suppository 650 mg    polyethylene glycol (GLYCOLAX) packet 17 g    warfarin placeholder: dosing by pharmacy    magnesium hydroxide (MILK OF MAGNESIA) 400 MG/5ML suspension 30 mL    furosemide (LASIX) injection 40 mg    levoFLOXacin (LEVAQUIN) 750 MG/150ML infusion 750 mg     Order Specific Question:   Antimicrobial Indications     Answer:   Pneumonia (CAP)     CONSULTS:  IP CONSULT TO INTERNAL MEDICINE  PHARMACY TO DOSE WARFARIN  IP CONSULT TO GENERAL SURGERY    FINAL IMPRESSION      1. Acute colitis    2. Constipation, unspecified constipation type          DISPOSITION/PLAN   DISPOSITION Admitted 03/29/2022 12:09:38 AM      PATIENT REFERRED TO:  No follow-up provider specified. DISCHARGE MEDICATIONS:  Current Discharge Medication List        Whit Lara MD  Attending Emergency Physician      Care during this encounter was due to an unprecedented national emergency due to COVID-19.            Ana Luisa Goodwin MD  03/29/22 4978

## 2022-03-29 NOTE — FLOWSHEET NOTE
03/29/22 0656   Treatment Team Notification   Reason for Communication Critical results   Type of Critical Result Laboratory   Critical Lab Result Type Coagulation  (INR 6.7)   Team Member Name Sussy Wilkerson   Treatment Team Role Advanced Practice Nurse   Method of Communication Secure Message   Response See orders  (hold coumadin, monitor for signs of bleeding)   Notification Time 2084

## 2022-03-29 NOTE — CONSULTS
General Surgery:    Consult Note      PATIENT NAME: Chantal Santamaria Dr Lopez Kohler: 11/24/1931    ADMISSION DATE: 3/28/2022  8:45 PM     Admitting Provider: Dr. Greg Rice Physician: Dr. Dick Whelan: 3/29/2022    Chief Complaint:  Abdominal bloating   Consult Regarding:  Constipation, colitis     HISTORY OF PRESENT ILLNESS:  The patient is a 80 y.o. male who presented to the ED due to concerns for abdominal distention. History is obtained with assistance from the patient's caregiver. Patient lives at home with 24-hour assistance. His caregiver states that they noticed that his abdomen was more distended prompting a visit to the emergency department. She states she believes she is not had a bowel movement other than streaks despite MiraLAX and suppositories for approximately 16 days. States that he has been passing some flatus. Denies nausea or recent emesis. He has been tolerating a soft diet at home. Patient denies any abdominal pain. On exam, he is afebrile and hemodynamically stable. Abdomen is soft, however, stool is palpable throughout the colon on exam.  CT of the abdomen pelvis was obtained in the emergency department which revealed a large stool burden including stercoral colitis. There is also significant distention of the urinary bladder. Reyes is in place with 2850cc of urine output. Enema was administered last night without significant results, small amount of liquid bowel movement was noted in brief upon admission to the floor.     Past Medical History:        Diagnosis Date    Anemia     Atrial fibrillation (HCC)     Atrophy of testis     Hyperlipidemia     Hypothyroidism     Osteoporosis     Overactive bladder     Palpitations     Prostate cancer ([facility] [address]     Vitamin D deficiency     Weight loss        Past Surgical History:        Procedure Laterality Date    HERNIA REPAIR Right        Medications Prior to Admission:   Medications Prior to Admission: senna-docusate (PERICOLACE) 8.6-50 MG per tablet, Take 1 tablet by mouth 2 times daily  furosemide (LASIX) 40 MG tablet, Take 40 mg by mouth daily  potassium chloride (MICRO-K) 10 MEQ extended release capsule, Take 20 mEq by mouth daily  ferrous sulfate (FE TABS 325) 325 (65 Fe) MG EC tablet, Take 325 mg by mouth 2 times daily (with meals)  magnesium hydroxide (MILK OF MAGNESIA) 400 MG/5ML suspension, Take 10 mLs by mouth daily as needed for Constipation  dilTIAZem (CARDIZEM CD) 240 MG extended release capsule, Take 1 capsule by mouth daily (Patient taking differently: Take 360 mg by mouth daily )  digoxin (LANOXIN) 125 MCG tablet, Take 1 tablet by mouth daily  oxybutynin (DITROPAN) 5 MG tablet, Take 5 mg by mouth daily  (Patient not taking: Reported on 3/29/2022)  warfarin (COUMADIN) 4 MG tablet, Take 4 mg by mouth daily    Allergies:  Lactose intolerance (gi) and Sulfa antibiotics    Social History:   Social History     Socioeconomic History    Marital status: Single     Spouse name: Not on file    Number of children: Not on file    Years of education: Not on file    Highest education level: Not on file   Occupational History    Not on file   Tobacco Use    Smoking status: Never Smoker    Smokeless tobacco: Never Used   Vaping Use    Vaping Use: Not on file   Substance and Sexual Activity    Alcohol use: Never    Drug use: Never    Sexual activity: Not on file   Other Topics Concern    Not on file   Social History Narrative    Not on file     Social Determinants of Health     Financial Resource Strain:     Difficulty of Paying Living Expenses: Not on file   Food Insecurity:     Worried About Running Out of Food in the Last Year: Not on file    Salome of Food in the Last Year: Not on file   Transportation Needs:     Lack of Transportation (Medical): Not on file    Lack of Transportation (Non-Medical):  Not on file   Physical Activity:     Days of Exercise per Week: Not on file    Minutes of Exercise per Session: Not on file   Stress:     Feeling of Stress : Not on file   Social Connections:     Frequency of Communication with Friends and Family: Not on file    Frequency of Social Gatherings with Friends and Family: Not on file    Attends Mu-ism Services: Not on file    Active Member of Clubs or Organizations: Not on file    Attends Club or Organization Meetings: Not on file    Marital Status: Not on file   Intimate Partner Violence:     Fear of Current or Ex-Partner: Not on file    Emotionally Abused: Not on file    Physically Abused: Not on file    Sexually Abused: Not on file   Housing Stability:     Unable to Pay for Housing in the Last Year: Not on file    Number of [place] in the Last Year: Not on file    Unstable Housing in the Last Year: Not on file       Family History:   History reviewed. No pertinent family history. REVIEW OF SYSTEMS:    CONSTITUTIONAL: No fevers, chills no decreased appetite  HEENT:  No nasal congestion or drainage. CARDIOVASCULAR: No chest pain, palpitation  GASTROINTESTINAL: No abdominal pain. No nausea or emesis. Positive for constipation. GENITOURINARY: Positive for urinary retention. No dysuria, hematuria. HEMATOLOGIC/LYMPHATIC: History of anemia, prostate cancer  ENDOCRINE: No polydipsia, heat or cold intolerance. History of hypothyroidism.     PHYSICAL EXAM:    VITALS:  /65   Pulse 65   Temp 97.5 °F (36.4 °C) (Axillary)   Resp 16   Ht 5' 4\" (1.626 m)   Wt 162 lb (73.5 kg)   SpO2 96%   BMI 27.81 kg/m²   INTAKE/OUTPUT:     Intake/Output Summary (Last 24 hours) at 3/29/2022 0106  Last data filed at 3/29/2022 0645  Gross per 24 hour   Intake --   Output 2850 ml   Net -2850 ml       CONSTITUTIONAL: Awake, alert, not acutely distressed  ENT: Normocephalic, atraumatic, EOMI  NECK:  supple, symmetrical, trachea midline   LUNGS: Normal effort, no acute respiratory distress, no accessory muscle usage  CARDIOVASCULAR: Regular rate and rhythm  ABDOMEN: Soft, nontender. Evidence of stool throughout the colon palpable on exam.  SKIN: No erythema or open wounds  MUSCULOSKELETAL:  No deformity or tenderness. Bilateral lower extremity edema. NEUROLOGIC: No focal deficits. Some confusion on questioning. CBC:   Lab Results   Component Value Date    WBC 7.0 03/28/2022    RBC 4.12 03/28/2022    HGB 10.5 03/28/2022    HCT 33.7 03/28/2022    MCV 81.8 03/28/2022    MCH 25.5 03/28/2022    MCHC 31.2 03/28/2022    RDW 22.7 03/28/2022     03/28/2022    MPV 9.3 03/28/2022     BMP:    Lab Results   Component Value Date     03/28/2022    K 3.3 03/28/2022    CL 98 03/28/2022    CO2 26 03/28/2022    BUN 24 03/28/2022    LABALBU 2.9 03/28/2022    CREATININE 1.01 03/28/2022    CALCIUM 8.9 03/28/2022    GFRAA >60 03/28/2022    LABGLOM >60 03/28/2022    GLUCOSE 111 03/28/2022       Pertinent Radiology:   CT ABDOMEN PELVIS WO CONTRAST Additional Contrast? None    Result Date: 3/28/2022  EXAMINATION: CT OF THE ABDOMEN AND PELVIS WITHOUT CONTRAST 3/28/2022 9:13 pm TECHNIQUE: CT of the abdomen and pelvis was performed without the administration of intravenous contrast. Multiplanar reformatted images are provided for review. Dose modulation, iterative reconstruction, and/or weight based adjustment of the mA/kV was utilized to reduce the radiation dose to as low as reasonably achievable. COMPARISON: None. HISTORY: ORDERING SYSTEM PROVIDED HISTORY: distention and mass palpable on abd, constipation TECHNOLOGIST PROVIDED HISTORY: distention and mass palpable on abd, constipation Decision Support Exception - unselect if not a suspected or confirmed emergency medical condition->Emergency Medical Condition (MA) Reason for Exam: distention and mass palpable on abd, constipation FINDINGS: Lower Chest: Moderate right and small left pleural effusions with adjacent consolidation. Coronary artery atherosclerosis. Cardiomegaly. Organs:  The limitations of a noncontrast examination, no acute abnormality within the liver, gallbladder, spleen, pancreas, or adrenal glands. No nephrolithiasis. Bilateral hydroureteronephrosis. GI/Bowel: Stomach is partially distended. The small bowel is nondilated. There is a large amount of stool within the colon, particularly the distal colon and rectum. There is wall thickening in the rectum. Pelvis: Bladder is distended without vesicular stone. Peritoneum/Retroperitoneum: No ascites or pneumoperitoneum. Atherosclerosis in the nondilated abdominal aorta. Bones/Soft Tissues: Multilevel degenerative changes of the lumbar spine. Compression deformity at T12. Anasarca. 1. Large amount of stool within the distal colon and rectum with wall thickening, concerning for stercoral colitis. 2. Marked distention of the urinary bladder with bilateral hydroureteronephrosis, concerning for urinary outlet obstruction. No nephrolithiasis. 3. Bilateral pleural effusions with adjacent consolidation, atelectasis versus pneumonia. 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 80 y.o. male with constipation, significant stool burden, colitis    Plan:  1. Continue medical mgmt and supportive care per primary  2. Diet: CLD  3. Will plan for candelaria disimpaction this morning   4. Plan for enemas following disimpaction   5. Monitor for bowel function   6. Replace potassium, mag PRN     Demetrius Fairchild, DO  General Surgery PGY-3   Attending Physician Statement  I have discussed the case, including pertinent history and exam findings with the resident. I agree with the assessment, plan and orders as documented by the resident.     Obstipation  CT reviewed  Manual disimpaction, hydration, ambulation

## 2022-03-29 NOTE — PROGRESS NOTES
Patient admitted from ER. See admission assessment for details. Pt accompanied by cg Pao. Pt forgetful and poor historian. Many questions answered by cg and by daughter Blayne Walls via telephone. Pt had small amount of liquid BM in brief upon transfer Pt denies pain/needs at this time.

## 2022-03-29 NOTE — PROGRESS NOTES
Physical Therapy    Facility/Department: [facility]  Initial Assessment    NAME: Lawrencemendyakua Ferrara Dr Merline Craven  : 1931  MRN: [id number]    Date of Service: 3/29/2022    Discharge Recommendations:  Patient would benefit from continued therapy after discharge     Pt presented to ED on 3/28/22 for lack of bowel movement over the last 2 weeks. Pt has been on multiple medications at home to help with constipation including suppositories stool softener and milk of molasses. Pt is not having any abdominal pain but he has distention in the abdomen and caregivers are concerned about possible mass in the abdomen. Pt admitted to the hospital for the management of Colitis. RN reports patient is medically stable for therapy treatment this date. Chart reviewed prior to treatment and patient is agreeable for therapy. Assessment   Body structures, Functions, Activity limitations: Decreased functional mobility ; Decreased strength;Decreased safe awareness;Decreased endurance;Decreased balance;Decreased posture;Decreased ADL status  Assessment: Pt with deficits of bed mobility, transfers, ambulation, balance, cognition, safety awareness and endurance, is very deconditioned,  & required 2 assist for safe functional mobility, transfers & gait. , & is decline compared to prior level of function. With current deficits, Pt HIGH risk for falls & requires continued PT to maximize independence with functional mobility, balance, safety awareness & activity tolerance to improve overall tolerance of I ADL's. Pt currently functioning below baseline. Would suggest additional therapy at time of discharge to maximize long term outcomes and prevent re-admission. Please refer to AM-PAC score for current level of function.   Prognosis: Good  Decision Making: High Complexity  Exam: ROM, MMT, functional mobility, activity tolerance, Balance, & [address] [facility] [address] [address] AM-PAC 6 Clicks Basic Mobility  Clinical Presentation: evolving  PT Education: Goals;PT Role;Plan of Care;Transfer Training;Functional Mobility Training;General Safety; Energy Conservation;Pressure Relief;Gait Training  Patient Education: Ed pt on functional mobility, safety awareness, importance of being up & OOB to regain strength, & prevention of sedentary complications, circulation ex's,  pressure relief & optimal breathing techniques  REQUIRES PT FOLLOW UP: Yes  Activity Tolerance  Activity Tolerance: Patient limited by fatigue;Patient limited by endurance       Patient Diagnosis(es): The primary encounter diagnosis was Acute colitis. A diagnosis of Constipation, unspecified constipation type was also pertinent to this visit. has a past medical history of Anemia, Atrial fibrillation (HCC), Atrophy of testis, Hyperlipidemia, Hypothyroidism, Osteoporosis, Overactive bladder, Palpitations, Prostate cancer ([facility] [address] Vitamin D deficiency, and Weight loss. has a past surgical history that includes hernia repair (Right). Restrictions  Restrictions/Precautions  Restrictions/Precautions: General Precautions,Fall Risk  Position Activity Restriction  Other position/activity restrictions: Up with assist, clear liquid diet, heels off bed at all times, RUE IV, catheter  Vision/Hearing  Vision: Impaired  Vision Exceptions: Wears glasses at all times (pt denies new vision changes or eye sx)  Hearing: Exceptions to [facility]  Hearing Exceptions: Hard of hearing/hearing concerns; No hearing aid     Subjective  General  Chart Reviewed: Yes  Patient assessed for rehabilitation services?: Yes  Additional Pertinent Hx: anemia, atrial fib  Response To Previous Treatment: Not applicable  General Comment  Comments: RN okays PT  Subjective  Subjective: Pt agreeable to PT  Pain Screening  Patient Currently in Pain: Denies  Vital Signs  Patient Currently in Pain: Denies       Orientation  Orientation  Overall Orientation Status: Impaired  Orientation Level: Disoriented to situation;Oriented to person;Disoriented to time;Oriented to place (pt knew his birthday but not current time)  Social/Functional History  Social/Functional History  Lives With: Alone  Type of Home: House  Home Layout: One level  Home Access: Stairs to enter without rails  Entrance Stairs - Number of Steps: 2  Bathroom Shower/Tub: Walk-in shower (has small lip to enter)  Bathroom Toilet: Handicap height  Bathroom Equipment: Shower chair  Home Equipment: Rolling walker  Receives Help From: Friend(s),Personal care attendant (pt has 24 hr caregivers)  ADL Assistance: Needs assistance (caregivers assist, pt does not use toilet(caregivers just let him go in his brief))  Homemaking Assistance: Needs assistance  Homemaking Responsibilities: No  Ambulation Assistance: Independent (pt uses R/walker)  Transfer Assistance: Needs assistance (to get OOB)  Active : No  Patient's  Info: caregivers  Occupation: Retired  Type of occupation: cardiologist  Leisure & Hobbies: nothing lately per his   IADL Comments: Pt has been very depressed for past several days per his [place]  Additional Comments: Pt fell 12/2021 & few weeks ago, was at [facility] after that recent fall, & D/C to [facility](pt home for 10 days before he was readmitted)  Cognition   Cognition  Overall Cognitive Status: Exceptions  Arousal/Alertness: Delayed responses to stimuli  Following Commands: Follows one step commands with increased time; Follows one step commands with repetition  Attention Span: Attends with cues to redirect  Memory: Decreased recall of biographical Information;Decreased recall of recent events  Safety Judgement: Decreased awareness of need for safety;Decreased awareness of need for assistance  Problem Solving: Assistance required to identify errors made;Assistance required to implement solutions;Assistance required to correct errors made;Assistance required to generate solutions  Insights: Decreased awareness of deficits  Initiation: Requires cues for all  Sequencing: Requires cues for some    Objective     Observation/Palpation  Posture: Fair  Observation: pt resting in bed, has very flat affect  Edema: BLE's    AROM RLE (degrees)  RLE AROM: WFL  AROM LLE (degrees)  LLE AROM : WFL  AROM RUE (degrees)  RUE General AROM: see OT assessment  AROM LUE (degrees)  LUE General AROM: see OT assessment  Strength RLE  Comment: 4/5  Strength LLE  Comment: 4/5  Strength RUE  Comment: see OT assessment  Strength LUE  Comment: see OT assessment  Tone RLE  RLE Tone: Normotonic  Tone LLE  LLE Tone: Normotonic  Coordination  Movements Are Fluid And Coordinated: Yes  Motor Control  Gross Motor?: WFL  Sensation  Overall Sensation Status: WFL (pt denies any paresthesias in hands/feet)  Bed mobility  Supine to Sit: Moderate assistance;2 Person assistance  Scooting: Moderate assistance;2 Person assistance  Comment: Max verbal instruction/tactile assist for UE hand placement on rail and proper log rolling tech + use of UB to scoot completely out to EOB with B foot placement to establish safe sitting balance, pursed lip breathing, encouragement, assist with line mgt, with increased time needed all to increase safety & reduce fall risk  Transfers  Sit to Stand: Moderate Assistance;2 Person Assistance  Stand to sit: Moderate Assistance;2 Person Assistance  Bed to Chair: Moderate assistance;2 Person Assistance  Stand Pivot Transfers: Moderate Assistance;2 Person Assistance  Lateral Transfers: Moderate Assistance;2 Person Assistance  Comment: MOD VC + tactile assist on correct use of upper body for safe sit/stand + to back all way back to surface with walker until she feels touch behind legs & to ensure she reaches with UB support to arms of chair  Ambulation  Ambulation?: Yes  Ambulation 1  Surface: level tile  Device: Rolling Walker  Assistance:  Moderate assistance;2 Person assistance  Quality of Gait: step to pattern, MOD cues to keep walker close & to amb inside base of walker & upright posture for safety/scanning  Gait Deviations: Decreased step length;Decreased step height;Decreased head and trunk rotation  Distance: 15ft     Balance  Posture: Fair  Sitting - Static: Good  Sitting - Dynamic: Good;-  Standing - Static: Fair;+ (R/W)  Standing - Dynamic: Fair (R/W)  Exercises  Comments: Ed circulation ex's, pursed lip breathing techniques,  pressure relief, & ex's to move what moves to maintain strength & joint congruency y      Core strengthening:  Pt completed seated ant/post & lateral WS seated & completed sit to stands x 3 to promote mobility and functional pre gait activities & completed static standing weight shifts & picking feet up off floor with UB support at R/walker to improve core strength & stability      Plan   Plan  Times per week: 1-2x/D, 5-6D/week  Current Treatment Recommendations: Strengthening,Balance Training,Functional Mobility Training,Transfer Training,Endurance Training,Gait Training,Home Exercise Program,Safety Education & Training,Patient/Caregiver Education & Training  Safety Devices  Type of devices: Call light within reach,Gait belt,Chair alarm in place,Patient at risk for falls,Left in chair,Nurse notified    G-Code       OutComes Score                                                  AM-PAC Score  AM-PAC Inpatient Mobility Raw Score : 11 (03/29/22 0943)  AM-PAC Inpatient T-Scale Score : 33.86 (03/29/22 0943)  Mobility Inpatient CMS 0-100% Score: 72.57 (03/29/22 0943)  Mobility Inpatient CMS G-Code Modifier : CL (03/29/22 0106)          Goals  Short term goals  Time Frame for Short term goals: 12 visits  Short term goal 1: Inc bed-mobility & transfers to independent to enable pt to safely get in/OOB & chair  Short term goal 2: Inc gait to amb 150ft or > indep w/ RW to enable pt to return to previous level of independence & able to demonstrate indep/ safe use of RW in functional activities including bending/ reaching, approaching counters and turning to sit.   Short term goal 3: Inc strength to [facility] & standing balance to good with device to facilitate pt independence for performance of ADL's & functional mobility, & reduce fall risk  Short term goal 4: Pt able to tolerate 30-40 min of activity to include 15-20 reps of ex, NMR & functional mobility with device to facilitate activity tolerance to [facility]  Short term goal 5: Ed pt on home ex's, safety & energy principles, fall prevention, & issue written home program       Therapy Time   Individual Concurrent Group Co-treatment   Time In 0903         Time Out 0943         Minutes 40           Treatment time: 40 minutes      Additional 10 minutes for chart review        Co-treatment with OT warranted secondary to decreased safety and independence requiring 2 skilled therapy professionals to address individual discipline's goals. PT addressing core control in transitions with bed mobility & transfers, seated/standing posture, standing postural alignment and breathing techniques, safety/scanning, & fall prevention in ambulation techniques.         [address], PT General

## 2022-03-29 NOTE — PLAN OF CARE
Nutrition Problem #1: Moderate malnutrition  Intervention: Food and/or Nutrient Delivery: Continue Current Diet,Start Oral Nutrition Supplement  Nutritional Goals: PO intakes to provide >75% of estimated nutritional needs

## 2022-03-29 NOTE — PROGRESS NOTES
Pt resting in bed with eyes closed. CG at bedside No attempts to get up unassisted. Fall precautions in place, call light in reach, falling star in place, alarm on bed, non skid socks in place.

## 2022-03-29 NOTE — CONSULTS
Warfarin Dosing - Pharmacy Consult Note  Consulting Provider: ROMEO Queen Indication:  Atrial Fibrillation  Warfarin Dose prior to admission: 4mg daily    Concurrent anticoagulants/antiplatelets: none  Significant Drug Interactions: quinolones (incr INR)  Recent Labs     03/28/22  2115 03/29/22  0524   INR  --  6.7*   HGB 10.5*  --      --    LABALBU 2.9*  --      Recent warfarin administrations        No warfarin orders with administrations found. Orders not given:            warfarin placeholder: dosing by pharmacy                   Date   INR    Dose  3/29        6.7    Assessment/Plan  (Goal INR: 2 - 3)  Inr is elevated. No coumadin today. Inr in am.     Active problem list reviewed. INR orders are placed. Chart reviewed for pertinent labs, drug/diet interactions, and past doses. Documentation of patient's clinical condition was reviewed. Pharmacy Dosing:  Pharmacy will continue to follow.

## 2022-03-29 NOTE — H&P (VIEW-ONLY)
[facility]  Office: [address], DO, Guy Olson, DO, Maikol Curry, DO, Hiram Queen, DO, Corin Dugan MD, Diana Mckeon MD, Harjit Fitzpatrick MD, Cheryl Coon MD, Cherry Leigh MD, Bhavna Gorman MD, Solitario Santiago MD, Zach Nikc, DO, Jocelyn Lara, DO, Josue Mustafa MD,  Chris Olson DO, Pravin Key MD, Gus Mendoza MD, Caty Boo MD, Vashti Rios DO, Danisha Quinteros MD, Shelby Regalado MD, Mari Tuttle, [facility], [facility] Abel, CNP, Reba Harley, CNP, Shanda Berg, CNS, Nate Grier, CNP, Tracy Morris, CNP, Lovely Bliss, CNP, Yamel Rodriguez, CNP, Marichuy Nieto, CNP, Mello Remy PA-C, Sania Silva, OSMAN, Ember Bahena, OSMAN, Juancarlos Saleh, CNP, Veronica Drew, CNP, Katherine Feliciano, [facility]    HISTORY AND PHYSICAL EXAMINATION            Date:   3/29/2022  Patient name:  Stefanie Marsh  Date of admission:  3/28/2022  8:45 PM  MRN:   [id number]  Account:  [de-identified]  YOB: 1931  PCP:    No primary care provider on file. Room:   [address] [place] [address]  Code Status:    Full Code    Chief Complaint:     Chief Complaint   Patient presents with    Abdominal Pain     raised ball on abdomen, no pain     History Obtained From:     Patient, patient's home health aid and electronic medical record. History of Present Illness:     Stefanie Marsh is a 80 y.o. Non- / non  male who presents with Abdominal Pain (raised ball on abdomen, no pain)   and is admitted to the hospital for the management of Colitis. The patient presents to the hospital with complaint of abdominal distention. The patient is seen by home health services and was noted to have a mass to his abdomen. The patient is a poor historian and is oriented to self and year. He is accompanied by his home health aide who provides the majority of history.   She states that the patient has not had a bowel movement in 15 days. He has been given a bottle of milk of magnesium, 2 suppositories and been taking daily stool softeners without result. She reports he has had several wet briefs daily. The home health aide stated that it was recommended the patient come to the hospital about a week ago, but he declined until today. The patient himself denies any abdominal pain, nausea, vomiting or additional symptomology. No further modifying factors. He has past medical history that includes atrial fibrillation (coumadin), prostate cancer (treated with 5yrs androgen deprivation therapy and external beam radiation), hypothyroidism (not on levothyroxine), dyslipidemia. He follows outpatient with Dr. Ferris with urology, he previously saw Dr. Saurav Penny. CT abdomen and pelvis without contrast: 1. Large amount of stool within the distal colon and rectum with wall thickening, concerning for stercoral colitis. 2. Marked distention of the urinary bladder with bilateral hydroureteronephrosis, concerning for urinary outlet obstruction. No nephrolithiasis. 3. Bilateral pleural effusions with adjacent consolidation, atelectasis versus pneumonia. Past Medical History:     Past Medical History:   Diagnosis Date    Anemia     Atrial fibrillation (HCC)     Atrophy of testis     Hyperlipidemia     Hypothyroidism     Osteoporosis     Overactive bladder     Palpitations     Prostate cancer ([facility] [address]     Vitamin D deficiency     Weight loss         Past Surgical History:     Past Surgical History:   Procedure Laterality Date    HERNIA REPAIR Right       Medications Prior to Admission:     Prior to Admission medications    Medication Sig Start Date End Date Taking?  Authorizing Provider   senna-docusate (PERICOLACE) 8.6-50 MG per tablet Take 1 tablet by mouth 2 times daily   Yes Historical Provider, MD   furosemide (LASIX) 40 MG tablet Take 40 mg by mouth daily   Yes Historical Provider, MD   potassium chloride (MICRO-K) 10 MEQ extended release capsule Take 20 mEq by mouth daily   Yes Historical Provider, MD   ferrous sulfate (FE TABS 325) 325 (65 Fe) MG EC tablet Take 325 mg by mouth 2 times daily (with meals)   Yes Historical Provider, MD   magnesium hydroxide (MILK OF MAGNESIA) 400 MG/5ML suspension Take 10 mLs by mouth daily as needed for Constipation   Yes Historical Provider, MD   dilTIAZem (CARDIZEM CD) 240 MG extended release capsule Take 1 capsule by mouth daily  Patient taking differently: Take 360 mg by mouth daily  3/4/22   Marisol Guerrero MD   digoxin (LANOXIN) 125 MCG tablet Take 1 tablet by mouth daily 3/3/22   Marisol Guerrero MD   oxybutynin (DITROPAN) 5 MG tablet Take 5 mg by mouth daily   Patient not taking: Reported on 3/29/2022    Historical Provider, MD   warfarin (COUMADIN) 4 MG tablet Take 4 mg by mouth daily    Historical Provider, MD        Allergies:     Sulfa antibiotics    Social History:     Tobacco:    reports that he has never smoked. He has never used smokeless tobacco.  Alcohol:      reports no history of alcohol use. Drug Use:  reports no history of drug use. Family History:     History reviewed. No pertinent family history. Patient denies past family history of hypertension, diabetes, asthma or cancer. Review of Systems:     Positive and Negative as described in HPI. Review of Systems   Constitutional: Negative for chills, diaphoresis and fever. HENT: Negative for congestion. Eyes: Negative for visual disturbance. Respiratory: Negative for cough and shortness of breath. Cardiovascular: Negative for chest pain and palpitations. Gastrointestinal: Positive for abdominal distention and constipation. Negative for abdominal pain, blood in stool, diarrhea, nausea and vomiting. Endocrine: Negative for cold intolerance and heat intolerance. Genitourinary: Negative for difficulty urinating, dysuria, frequency and urgency. Musculoskeletal: Negative for arthralgias and myalgias.    Skin: Negative for color change and rash. Neurological: Negative for dizziness, numbness and headaches. Hematological: Bruises/bleeds easily. Due to coumadin    Psychiatric/Behavioral: The patient is not nervous/anxious. All other systems reviewed and are negative. Physical Exam:   BP (!) 140/63   Pulse 64   Temp 97.5 °F (36.4 °C) (Axillary)   Resp 18   Ht 5' 4\" (1.626 m)   Wt 162 lb (73.5 kg)   SpO2 96%   BMI 27.81 kg/m²   Temp (24hrs), Av.5 °F (36.4 °C), Min:97.5 °F (36.4 °C), Max:97.5 °F (36.4 °C)    No results for input(s): POCGLU in the last 72 hours. Intake/Output Summary (Last 24 hours) at 3/29/2022 0117  Last data filed at 3/28/2022 2330  Gross per 24 hour   Intake    Output 900 ml   Net -900 ml       Physical Exam  Vitals and nursing note reviewed. Constitutional:       Appearance: He is underweight. HENT:      Head: Normocephalic and atraumatic. Right Ear: Hearing normal.      Left Ear: Hearing normal.      Nose: Nose normal. No rhinorrhea. Eyes:      General: Lids are normal.      Extraocular Movements:      Right eye: Normal extraocular motion. Left eye: Normal extraocular motion. Conjunctiva/sclera: Conjunctivae normal.      Right eye: Right conjunctiva is not injected. Left eye: Left conjunctiva is not injected. Pupils: Pupils are equal, round, and reactive to light. Pupils are equal.      Right eye: Pupil is reactive. Left eye: Pupil is reactive. Neck:      Trachea: Trachea normal. No tracheal deviation. Cardiovascular:      Rate and Rhythm: Normal rate. Rhythm irregularly irregular. Pulses:           Dorsalis pedis pulses are 1+ on the right side and 1+ on the left side. Posterior tibial pulses are 1+ on the right side and 1+ on the left side. Heart sounds: Normal heart sounds.    Pulmonary:      Effort: Pulmonary effort is normal.      Breath sounds: Examination of the right-lower field reveals decreased breath sounds. Examination of the left-lower field reveals decreased breath sounds. Decreased breath sounds present. Abdominal:      General: Bowel sounds are normal.      Palpations: Abdomen is soft. There is no mass. Tenderness: There is no abdominal tenderness. There is no guarding. Musculoskeletal:         General: No tenderness. Cervical back: Neck supple. Right lower le+ Edema present. Left lower le+ Edema present. Skin:     General: Skin is warm and dry. Comments: Scattered bruises to bilateral upper extremities. Neurological:      Mental Status: He is alert. Comments: Oriented to person and year. Disoriented to time, cannot tell me who the president is.     Psychiatric:         Speech: Speech normal.         Investigations:      Laboratory Testing:  Recent Results (from the past 24 hour(s))   CBC with Auto Differential    Collection Time: 22  9:15 PM   Result Value Ref Range    WBC 7.0 3.5 - 11.3 k/uL    RBC 4.12 (L) 4.21 - 5.77 m/uL    Hemoglobin 10.5 (L) 13.0 - 17.0 g/dL    Hematocrit 33.7 (L) 40.7 - 50.3 %    MCV 81.8 (L) 82.6 - 102.9 fL    MCH 25.5 25.2 - 33.5 pg    MCHC 31.2 28.4 - 34.8 g/dL    RDW 22.7 (H) 11.8 - 14.4 %    Platelets 132 864 - 665 k/uL    MPV 9.3 8.1 - 13.5 fL    NRBC Automated 0.0 0.0 per 100 WBC    Seg Neutrophils 72 (H) 36 - 66 %    Lymphocytes 16 (L) 24 - 44 %    Monocytes 11 (H) 1 - 7 %    Eosinophils % 1 1 - 4 %    Basophils 0 %    Immature Granulocytes 0 0 %    Segs Absolute 5.04 1.8 - 7.7 k/uL    Absolute Lymph # 1.12 1.0 - 4.8 k/uL    Absolute Mono # 0.77 0.2 - 0.8 k/uL    Absolute Eos # 0.07 0.0 - 0.4 k/uL    Basophils Absolute 0.00 0.0 - 0.2 k/uL    Absolute Immature Granulocyte 0.00 0.00 - 0.30 k/uL    Morphology ANISOCYTOSIS PRESENT     Morphology 1+ ACANTHOCYTES    Comprehensive Metabolic Panel w/ Reflex to MG    Collection Time: 22  9:15 PM   Result Value Ref Range    Glucose 111 (H) 70 - 99 mg/dL    BUN 24 (H) 8 - 23 mg/dL    CREATININE 1.01 0.70 - 1.20 mg/dL    Bun/Cre Ratio 24 (H) 9 - 20    Calcium 8.9 8.6 - 10.4 mg/dL    Sodium 134 (L) 135 - 144 mmol/L    Potassium 3.3 (L) 3.7 - 5.3 mmol/L    Chloride 98 98 - 107 mmol/L    CO2 26 20 - 31 mmol/L    Anion Gap 10 9 - 17 mmol/L    Alkaline Phosphatase 125 40 - 129 U/L    ALT 19 5 - 41 U/L    AST 25 <40 U/L    Total Bilirubin 0.97 0.3 - 1.2 mg/dL    Total Protein 6.2 (L) 6.4 - 8.3 g/dL    Albumin 2.9 (L) 3.5 - 5.2 g/dL    GFR Non-African American >60 >60 mL/min    GFR African American >60 >60 mL/min    GFR Comment         Magnesium    Collection Time: 03/28/22  9:15 PM   Result Value Ref Range    Magnesium 2.0 1.6 - 2.6 mg/dL   Urinalysis with Reflex to Culture    Collection Time: 03/28/22 10:45 PM    Specimen: Urine, indwelling catheter   Result Value Ref Range    Color, UA Yellow Yellow    Turbidity UA Clear Clear    Glucose, Ur NEGATIVE NEGATIVE    Bilirubin Urine NEGATIVE NEGATIVE    Ketones, Urine NEGATIVE NEGATIVE    Specific Gravity, UA 1.015 1.005 - 1.030    Urine Hgb NEGATIVE NEGATIVE    pH, UA 7.5 5.0 - 8.0    Protein, UA NEGATIVE NEGATIVE    Urobilinogen, Urine Normal Normal    Nitrite, Urine NEGATIVE NEGATIVE    Leukocyte Esterase, Urine NEGATIVE NEGATIVE       Imaging/Diagnostics:  CT ABDOMEN PELVIS WO CONTRAST Additional Contrast? None    Result Date: 3/28/2022  1. Large amount of stool within the distal colon and rectum with wall thickening, concerning for stercoral colitis. 2. Marked distention of the urinary bladder with bilateral hydroureteronephrosis, concerning for urinary outlet obstruction. No nephrolithiasis. 3. Bilateral pleural effusions with adjacent consolidation, atelectasis versus pneumonia.        Assessment :      Hospital Problems           Last Modified POA    * (Principal) Colitis 3/29/2022 Yes    Atrial fibrillation ([address] 3/29/2022 Yes    Chronic anticoagulation 3/29/2022 Yes    Other constipation 3/29/2022 Yes    Pneumonia of right lower lobe due to infectious organism 3/29/2022 Yes        Plan:     Patient status inpatient in the  Med/Surge    1. Consult general surgery, patient may require disimpaction. 2. Stercoral colitis secondary to constipation- fleet enema, bowl regimen. 3. Continue warfarin for now. Monitor daily INR. 4. Afib- continue diltiazem. 5. Suspected pneumonia- start Levaquin. Check procalcitonin. If negative consider discontinuing. 6. Reyes catheter for urinary retention- nursing reports patient had 1500ml output. Consider inpatient urology consult versus outpatient follow up. 7. Gentle diuresis- scrotal and leg edema. 8. Monitor vital signs. 9. Follow chemistries. 10. Correct electrolyte imbalances. 11. Clear liquid diet. 12. Activity as tolerated with assist.    Plan of care discussed in room with patient, nursing staff and patient's home health aide. Total time spent on direct and indirect patient care, orders, documentation and discussions with other providers and staff > 20 minutes. Consultations:   IP CONSULT TO INTERNAL MEDICINE  PHARMACY TO DOSE WARFARIN  IP CONSULT TO GENERAL SURGERY    Patient is admitted as inpatient status because of co-morbidities listed above, severity of signs and symptoms as outlined, requirement for current medical therapies and most importantly because of direct risk to patient if care not provided in a hospital setting. Expected length of stay > 48 hours. MISTI Gallegos - CNP  3/29/2022  1:17 AM    Copy sent to Dr. Cuevas primary care provider on file.

## 2022-03-29 NOTE — CARE COORDINATION
Case Management Initial Discharge Aidee Chilel,         Readmission Risk              Risk of Unplanned Readmission:  19             Met with:patient or care takers  to discuss discharge plans. Information verified: address, contacts, phone number, , insurance Yes  PCP: Santiago Chung DO  Date of last visit: Friday in the home     Insurance Provider: medicare and [facility]     Discharge Planning  Current Residence:  Private home  Living Arrangements:  Alone : 24 hour care       Home has 1 stories/0 stairs to climb  Support Systems:  [place] Staff       Current Services PTA:    Home care skilled thru [facility]  [facility] both [facility] and friends of family along with 2 private pay with Candice Durham [phone]      Patient able to perform ADL's:Dependent  DME in home:  Rubie Mcardle, [facility], w/c and shower chair   DME used to aid ambulation prior to admission:   Rubie Mcardle   DME used during admission:  tbd     Potential Assistance Needed:  [address]: [place] Medications:  No  Does patient want to participate in local refill/ meds to beds program?  Yes    Patient agreeable to home care: Yes  Freedom of choice provided:  yes     The Plan for Transition of Care is related to the following treatment goals: skilled RN therapy     The Patient and/or patient representative caretakers  was provided with a choice of provider and agrees   with the discharge plan. [x] Yes [] No    Freedom of choice list was provided with basic dialogue that supports the patient's individualized plan of care/goals, treatment preferences and shares the quality data associated with the providers.  [x] Yes [] No      Type of Home Care Services:  [facility] [address]  Patient expects to be discharged to:    snf vs home care     Prior SNF/Rehab Placement and Facility: [facility]   Agreeable to SNF/Rehab: No  [place] of choice provided: n/a   Evaluation: n/a    Expected Discharge date:    3/31  Follow Up Appointment: Best Day/ Time:    After noon      Transportation provider: per caretakers   Transportation arrangements needed for discharge: No    Discharge Plan:   Met with patient patient and caretaker Camilo Joyce ( [phone]) in room. Patient is a retired physician. He has visiting Dr Traci Walton as his PCP. Patient lives at home and is dependent on 24 hour caretakers. He is on coumadin at home under his pcp. He can walk with assistance. He has 2 private A [address] [place] [address] and [facility] . he also has his  and assistant Mere Slaughter at [phone]. He has 2 agencies that help provide the 24 hour care bot [facility] and friends of family. He was recently here 2/28-3/4 for a fib and discharged to [facility]. From there he returned home with raghu. Notified raghu of the admission. salome does not think doctor yamileth wishes to go back to a rehab. Patient has daughter out in [address] [place] [address] at [phone]. CHRISTA in epic and will follow for therapy recommendations. Anticipate patient to decline snf and return home with 24 hour care as prior to admission.  CHRISTA in epic     Electronically signed by Edmund Pop RN on 3/29/22 at 11:33 AM EDT

## 2022-03-29 NOTE — PROGRESS NOTES
Comprehensive Nutrition Assessment    Type and Reason for Visit:  Initial,Positive Nutrition Screen (Food Preference: Buddhism)    Nutrition Recommendations/Plan:   1. Continue Clear Liquids  2. Added Ensure Clear TID   3. Monitor po intakes, ONS acceptance, diet advancement/tolerance, GI function, weights and labs     Nutrition Assessment:  Patient admission r/t colitis. Patient caregiver reports pt has not had a BM in 16 days even with MiraLAX and suppositories. Pt was not tolerating soft diet. Stated weight for admission weight -- YANIRA weight changes. PO intakes minimal d/t CLD (only had lemonade). Spoke with caregiver- she states he was only eating jello, tomato soup, oatmeal and protein shakes. She is unable to provide a UBW for him. No N/V noted. Will send Ensure Clear supplements TID due to CLD and poor intake. Malnutrition Assessment:  Malnutrition Status: Moderate malnutrition    Context:  Acute Illness     Findings of the 6 clinical characteristics of malnutrition:  Energy Intake:  1 - 75% or less of estimated energy requirements for 7 or more days  Weight Loss:  Unable to assess     Body Fat Loss:  1 - Mild body fat loss Orbital   Muscle Mass Loss:  1 - Mild muscle mass loss Temples (temporalis)  Fluid Accumulation:  7 - Moderate to Severe Extremities   Strength:  Not Performed    Estimated Daily Nutrient Needs:  Energy (kcal):  9134-9035 (23-25 kcal/kg); Weight Used for Energy Requirements:  Current     Protein (g):  77-89 gm protein (1.3-1.5 g/kg); Weight Used for Protein Requirements:  Ideal          Nutrition Related Findings:  Hypoactive bowel sounds (4Q). Edema: BLE +3 pitting. Wounds:  None       Current Nutrition Therapies:    ADULT DIET;  Clear Liquid    Anthropometric Measures:  · Height: 5' 4\" (162.6 cm)  · Current Body Weight: 162 lb (73.5 kg)   · Admission Body Weight: 162 lb (73.5 kg)    · Usual Body Weight: 156 lb (70.8 kg) (bedscale weight from 2/28/22)     · Ideal Body Weight: 130 lbs; % Ideal Body Weight 124.6 %   · BMI: 27.8  · Adjusted Body Weight:  ; No Adjustment   · BMI Categories: Overweight (BMI 25.0-29. 9)       Nutrition Diagnosis:   · Moderate malnutrition related to inadequate protein-energy intake,altered GI function as evidenced by NPO or clear liquid status due to medical condition,constipation,mild loss of subcutaneous fat,mild muscle loss,intake 0-25%    Nutrition Interventions:   Food and/or Nutrient Delivery:  Continue Current Diet,Start Oral Nutrition Supplement  Nutrition Education/Counseling:  Education not indicated   Coordination of Nutrition Care:  Continue to monitor while inpatient    Goals:  PO intakes to provide >75% of estimated nutritional needs       Nutrition Monitoring and Evaluation:   Behavioral-Environmental Outcomes:  None Identified   Food/Nutrient Intake Outcomes:  Diet Advancement/Tolerance,Food and Nutrient Intake,Supplement Intake  Physical Signs/Symptoms Outcomes:  Biochemical Data,Weight,Skin,GI Status,Constipation,Fluid Status or Edema     Discharge Planning:     Too soon to determine     Lacretia Denver, [address] [place] [address], [address] [place] [address]  Office Number: [phone]

## 2022-03-29 NOTE — H&P
[facility]  Office: [address], DO, Shurti Ivonne DO, Pamellaswathi Tejeda, DO, Yashira Mikal Queen, DO, Sabino Robbins MD, Suzie Yao MD, Darryle So, MD, Sakina Clancy MD, Abdirashid Zimmerman MD, Haider Sweeney MD, Chery Breen MD, Jenn De La Cruz, DO, Ana Luisa Bae DO, Jesus Mcdaniel MD,  Anu Alvarez DO, Jak Cadena MD, Isrrael Mccarthy MD, Johnathan Jeffrey MD, Jarvis Dial DO, Josr Rothman MD, Ariana Ching MD, Yara Valadez, [facility], [facility], CNP, Piter Williamson, CNP, Cornelius Beth, CNS, Alexey Kovacs, CNP, Laureano Choudhury, CNP, Sayra Crenshaw, CNP, Lyle Barros, CNP, Janet Lancaster, CNP, Rufina Herrera PA-C, Porsha Price DNP, Ghislaine Dos Santos DNP, Lloyd Gabriel, CNP, Lindy Nevarez, CNP, Katie Haile, [facility]    HISTORY AND PHYSICAL EXAMINATION            Date:   3/29/2022  Patient name:  Eddy Hawkins  Date of admission:  3/28/2022  8:45 PM  MRN:   [id number]  Account:  [de-identified]  YOB: 1931  PCP:    No primary care provider on file. Room:   [address] [place] [address]  Code Status:    Full Code    Chief Complaint:     Chief Complaint   Patient presents with    Abdominal Pain     raised ball on abdomen, no pain     History Obtained From:     Patient, patient's home health aid and electronic medical record. History of Present Illness:     Eddy Hawkins is a 80 y.o. Non- / non  male who presents with Abdominal Pain (raised ball on abdomen, no pain)   and is admitted to the hospital for the management of Colitis. The patient presents to the hospital with complaint of abdominal distention. The patient is seen by home health services and was noted to have a mass to his abdomen. The patient is a poor historian and is oriented to self and year. He is accompanied by his home health aide who provides the majority of history.   She states that the patient has not had a bowel movement in 15 days. He has been given a bottle of milk of magnesium, 2 suppositories and been taking daily stool softeners without result. She reports he has had several wet briefs daily. The home health aide stated that it was recommended the patient come to the hospital about a week ago, but he declined until today. The patient himself denies any abdominal pain, nausea, vomiting or additional symptomology. No further modifying factors. He has past medical history that includes atrial fibrillation (coumadin), prostate cancer (treated with 5yrs androgen deprivation therapy and external beam radiation), hypothyroidism (not on levothyroxine), dyslipidemia. He follows outpatient with  [facility] with urology, he previously saw Dr. Ivis Cruz. CT abdomen and pelvis without contrast: 1. Large amount of stool within the distal colon and rectum with wall thickening, concerning for stercoral colitis. 2. Marked distention of the urinary bladder with bilateral hydroureteronephrosis, concerning for urinary outlet obstruction. No nephrolithiasis. 3. Bilateral pleural effusions with adjacent consolidation, atelectasis versus pneumonia. Past Medical History:     Past Medical History:   Diagnosis Date    Anemia     Atrial fibrillation (HCC)     Atrophy of testis     Hyperlipidemia     Hypothyroidism     Osteoporosis     Overactive bladder     Palpitations     Prostate cancer ([facility] [address]     Vitamin D deficiency     Weight loss         Past Surgical History:     Past Surgical History:   Procedure Laterality Date    HERNIA REPAIR Right       Medications Prior to Admission:     Prior to Admission medications    Medication Sig Start Date End Date Taking?  Authorizing Provider   senna-docusate (PERICOLACE) 8.6-50 MG per tablet Take 1 tablet by mouth 2 times daily   Yes Historical Provider, MD   furosemide (LASIX) 40 MG tablet Take 40 mg by mouth daily   Yes Historical Provider, MD   potassium chloride (MICRO-K) 10 MEQ extended release capsule Take 20 mEq by mouth daily   Yes Historical Provider, MD   ferrous sulfate (FE TABS 325) 325 (65 Fe) MG EC tablet Take 325 mg by mouth 2 times daily (with meals)   Yes Historical Provider, MD   magnesium hydroxide (MILK OF MAGNESIA) 400 MG/5ML suspension Take 10 mLs by mouth daily as needed for Constipation   Yes Historical Provider, MD   dilTIAZem (CARDIZEM CD) 240 MG extended release capsule Take 1 capsule by mouth daily  Patient taking differently: Take 360 mg by mouth daily  3/4/22   Judy Rhodess, MD   digoxin (LANOXIN) 125 MCG tablet Take 1 tablet by mouth daily 3/3/22   Judy Rhodess, MD   oxybutynin (DITROPAN) 5 MG tablet Take 5 mg by mouth daily   Patient not taking: Reported on 3/29/2022    Historical Provider, MD   warfarin (COUMADIN) 4 MG tablet Take 4 mg by mouth daily    Historical Provider, MD        Allergies:     Sulfa antibiotics    Social History:     Tobacco:    reports that he has never smoked. He has never used smokeless tobacco.  Alcohol:      reports no history of alcohol use. Drug Use:  reports no history of drug use. Family History:     History reviewed. No pertinent family history. Patient denies past family history of hypertension, diabetes, asthma or cancer. Review of Systems:     Positive and Negative as described in HPI. Review of Systems   Constitutional: Negative for chills, diaphoresis and fever. HENT: Negative for congestion. Eyes: Negative for visual disturbance. Respiratory: Negative for cough and shortness of breath. Cardiovascular: Negative for chest pain and palpitations. Gastrointestinal: Positive for abdominal distention and constipation. Negative for abdominal pain, blood in stool, diarrhea, nausea and vomiting. Endocrine: Negative for cold intolerance and heat intolerance. Genitourinary: Negative for difficulty urinating, dysuria, frequency and urgency. Musculoskeletal: Negative for arthralgias and myalgias.    Skin: Negative for color change and rash. Neurological: Negative for dizziness, numbness and headaches. Hematological: Bruises/bleeds easily. Due to coumadin    Psychiatric/Behavioral: The patient is not nervous/anxious. All other systems reviewed and are negative. Physical Exam:   BP (!) 140/63   Pulse 64   Temp 97.5 °F (36.4 °C) (Axillary)   Resp 18   Ht 5' 4\" (1.626 m)   Wt 162 lb (73.5 kg)   SpO2 96%   BMI 27.81 kg/m²   Temp (24hrs), Av.5 °F (36.4 °C), Min:97.5 °F (36.4 °C), Max:97.5 °F (36.4 °C)    No results for input(s): POCGLU in the last 72 hours. Intake/Output Summary (Last 24 hours) at 3/29/2022 0117  Last data filed at 3/28/2022 2330  Gross per 24 hour   Intake --   Output 900 ml   Net -900 ml       Physical Exam  Vitals and nursing note reviewed. Constitutional:       Appearance: He is underweight. HENT:      Head: Normocephalic and atraumatic. Right Ear: Hearing normal.      Left Ear: Hearing normal.      Nose: Nose normal. No rhinorrhea. Eyes:      General: Lids are normal.      Extraocular Movements:      Right eye: Normal extraocular motion. Left eye: Normal extraocular motion. Conjunctiva/sclera: Conjunctivae normal.      Right eye: Right conjunctiva is not injected. Left eye: Left conjunctiva is not injected. Pupils: Pupils are equal, round, and reactive to light. Pupils are equal.      Right eye: Pupil is reactive. Left eye: Pupil is reactive. Neck:      Trachea: Trachea normal. No tracheal deviation. Cardiovascular:      Rate and Rhythm: Normal rate. Rhythm irregularly irregular. Pulses:           Dorsalis pedis pulses are 1+ on the right side and 1+ on the left side. Posterior tibial pulses are 1+ on the right side and 1+ on the left side. Heart sounds: Normal heart sounds.    Pulmonary:      Effort: Pulmonary effort is normal.      Breath sounds: Examination of the right-lower field reveals decreased breath sounds. Examination of the left-lower field reveals decreased breath sounds. Decreased breath sounds present. Abdominal:      General: Bowel sounds are normal.      Palpations: Abdomen is soft. There is no mass. Tenderness: There is no abdominal tenderness. There is no guarding. Musculoskeletal:         General: No tenderness. Cervical back: Neck supple. Right lower le+ Edema present. Left lower le+ Edema present. Skin:     General: Skin is warm and dry. Comments: Scattered bruises to bilateral upper extremities. Neurological:      Mental Status: He is alert. Comments: Oriented to person and year. Disoriented to time, cannot tell me who the president is.     Psychiatric:         Speech: Speech normal.         Investigations:      Laboratory Testing:  Recent Results (from the past 24 hour(s))   CBC with Auto Differential    Collection Time: 22  9:15 PM   Result Value Ref Range    WBC 7.0 3.5 - 11.3 k/uL    RBC 4.12 (L) 4.21 - 5.77 m/uL    Hemoglobin 10.5 (L) 13.0 - 17.0 g/dL    Hematocrit 33.7 (L) 40.7 - 50.3 %    MCV 81.8 (L) 82.6 - 102.9 fL    MCH 25.5 25.2 - 33.5 pg    MCHC 31.2 28.4 - 34.8 g/dL    RDW 22.7 (H) 11.8 - 14.4 %    Platelets 987 133 - 871 k/uL    MPV 9.3 8.1 - 13.5 fL    NRBC Automated 0.0 0.0 per 100 WBC    Seg Neutrophils 72 (H) 36 - 66 %    Lymphocytes 16 (L) 24 - 44 %    Monocytes 11 (H) 1 - 7 %    Eosinophils % 1 1 - 4 %    Basophils 0 %    Immature Granulocytes 0 0 %    Segs Absolute 5.04 1.8 - 7.7 k/uL    Absolute Lymph # 1.12 1.0 - 4.8 k/uL    Absolute Mono # 0.77 0.2 - 0.8 k/uL    Absolute Eos # 0.07 0.0 - 0.4 k/uL    Basophils Absolute 0.00 0.0 - 0.2 k/uL    Absolute Immature Granulocyte 0.00 0.00 - 0.30 k/uL    Morphology ANISOCYTOSIS PRESENT     Morphology 1+ ACANTHOCYTES    Comprehensive Metabolic Panel w/ Reflex to MG    Collection Time: 22  9:15 PM   Result Value Ref Range    Glucose 111 (H) 70 - 99 mg/dL    BUN 24 (H) 8 - 23 mg/dL    CREATININE 1.01 0.70 - 1.20 mg/dL    Bun/Cre Ratio 24 (H) 9 - 20    Calcium 8.9 8.6 - 10.4 mg/dL    Sodium 134 (L) 135 - 144 mmol/L    Potassium 3.3 (L) 3.7 - 5.3 mmol/L    Chloride 98 98 - 107 mmol/L    CO2 26 20 - 31 mmol/L    Anion Gap 10 9 - 17 mmol/L    Alkaline Phosphatase 125 40 - 129 U/L    ALT 19 5 - 41 U/L    AST 25 <40 U/L    Total Bilirubin 0.97 0.3 - 1.2 mg/dL    Total Protein 6.2 (L) 6.4 - 8.3 g/dL    Albumin 2.9 (L) 3.5 - 5.2 g/dL    GFR Non-African American >60 >60 mL/min    GFR African American >60 >60 mL/min    GFR Comment         Magnesium    Collection Time: 03/28/22  9:15 PM   Result Value Ref Range    Magnesium 2.0 1.6 - 2.6 mg/dL   Urinalysis with Reflex to Culture    Collection Time: 03/28/22 10:45 PM    Specimen: Urine, indwelling catheter   Result Value Ref Range    Color, UA Yellow Yellow    Turbidity UA Clear Clear    Glucose, Ur NEGATIVE NEGATIVE    Bilirubin Urine NEGATIVE NEGATIVE    Ketones, Urine NEGATIVE NEGATIVE    Specific Gravity, UA 1.015 1.005 - 1.030    Urine Hgb NEGATIVE NEGATIVE    pH, UA 7.5 5.0 - 8.0    Protein, UA NEGATIVE NEGATIVE    Urobilinogen, Urine Normal Normal    Nitrite, Urine NEGATIVE NEGATIVE    Leukocyte Esterase, Urine NEGATIVE NEGATIVE       Imaging/Diagnostics:  CT ABDOMEN PELVIS WO CONTRAST Additional Contrast? None    Result Date: 3/28/2022  1. Large amount of stool within the distal colon and rectum with wall thickening, concerning for stercoral colitis. 2. Marked distention of the urinary bladder with bilateral hydroureteronephrosis, concerning for urinary outlet obstruction. No nephrolithiasis. 3. Bilateral pleural effusions with adjacent consolidation, atelectasis versus pneumonia.        Assessment :      Hospital Problems           Last Modified POA    * (Principal) Colitis 3/29/2022 Yes    Atrial fibrillation ([address] 3/29/2022 Yes    Chronic anticoagulation 3/29/2022 Yes    Other constipation 3/29/2022 Yes    Pneumonia of right lower lobe due to infectious organism 3/29/2022 Yes        Plan:     Patient status inpatient in the  Med/Surge    1. Consult general surgery, patient may require disimpaction. 2. Stercoral colitis secondary to constipation- fleet enema, bowl regimen. 3. Continue warfarin for now. Monitor daily INR. 4. Afib- continue diltiazem. 5. Suspected pneumonia- start Levaquin. Check procalcitonin. If negative consider discontinuing. 6. Reyes catheter for urinary retention- nursing reports patient had 1500ml output. Consider inpatient urology consult versus outpatient follow up. 7. Gentle diuresis- scrotal and leg edema. 8. Monitor vital signs. 9. Follow chemistries. 10. Correct electrolyte imbalances. 11. Clear liquid diet. 12. Activity as tolerated with assist.    Plan of care discussed in room with patient, nursing staff and patient's home health aide. Total time spent on direct and indirect patient care, orders, documentation and discussions with other providers and staff > 20 minutes. Consultations:   IP CONSULT TO INTERNAL MEDICINE  PHARMACY TO DOSE WARFARIN  IP CONSULT TO GENERAL SURGERY    Patient is admitted as inpatient status because of co-morbidities listed above, severity of signs and symptoms as outlined, requirement for current medical therapies and most importantly because of direct risk to patient if care not provided in a hospital setting. Expected length of stay > 48 hours. MISTI Vela - CNP  3/29/2022  1:17 AM    Copy sent to Dr. Cuevas primary care provider on file.

## 2022-03-29 NOTE — PLAN OF CARE
Problem: Falls - Risk of:  Goal: Will remain free from falls  Description: Will remain free from falls  Outcome: Ongoing  Note: Siderails up x 2  Hourly rounding  Call light in reach  Instructed to call for assist before attempting out of bed. Remains free from falls and accidental injury at this time   Floor free from obstacles  Bed is locked and in lowest position  Adequate lighting provided  Bed alarm on, Red Falling star and Stay with Me signs posted  Goal: Absence of physical injury  Description: Absence of physical injury  Outcome: Ongoing     Problem: Skin Integrity:  Goal: Will show no infection signs and symptoms  Description: Will show no infection signs and symptoms  Outcome: Ongoing  Note: Checked for incontinence every 2 hours and prn. Pericare as needed. Assisted to reposition off back frequently. On waffle mattress. Heels off bed with pillows.   Goal: Absence of new skin breakdown  Description: Absence of new skin breakdown  Outcome: Ongoing     Problem: Nutrition  Goal: Optimal nutrition therapy  Outcome: Ongoing

## 2022-03-30 LAB
-: NORMAL
ANION GAP SERPL CALCULATED.3IONS-SCNC: 10 MMOL/L (ref 9–17)
BILIRUBIN URINE: NEGATIVE
BUN BLDV-MCNC: 15 MG/DL (ref 8–23)
BUN/CREAT BLD: 21 (ref 9–20)
CALCIUM SERPL-MCNC: 8.5 MG/DL (ref 8.6–10.4)
CASTS UA: NORMAL /LPF
CASTS UA: NORMAL /LPF
CHLORIDE BLD-SCNC: 99 MMOL/L (ref 98–107)
CO2: 30 MMOL/L (ref 20–31)
COLOR: YELLOW
CREAT SERPL-MCNC: 0.7 MG/DL (ref 0.7–1.2)
EPITHELIAL CELLS UA: NORMAL /HPF (ref 0–5)
GFR AFRICAN AMERICAN: >60 ML/MIN
GFR NON-AFRICAN AMERICAN: >60 ML/MIN
GFR SERPL CREATININE-BSD FRML MDRD: ABNORMAL ML/MIN/{1.73_M2}
GLUCOSE BLD-MCNC: 103 MG/DL (ref 70–99)
GLUCOSE URINE: NEGATIVE
INR BLD: 8.8
KETONES, URINE: NEGATIVE
LEUKOCYTE ESTERASE, URINE: NEGATIVE
MAGNESIUM: 1.7 MG/DL (ref 1.6–2.6)
NITRITE, URINE: NEGATIVE
PH UA: 7 (ref 5–8)
POTASSIUM SERPL-SCNC: 2.5 MMOL/L (ref 3.7–5.3)
PROSTATE SPECIFIC ANTIGEN: <0.02 UG/L
PROTEIN UA: NEGATIVE
PROTHROMBIN TIME: 71.9 SEC (ref 11.5–14.2)
RBC UA: NORMAL /HPF (ref 0–2)
SODIUM BLD-SCNC: 139 MMOL/L (ref 135–144)
SPECIFIC GRAVITY UA: 1.01 (ref 1–1.03)
TURBIDITY: CLEAR
URINE HGB: ABNORMAL
UROBILINOGEN, URINE: NORMAL
WBC UA: NORMAL /HPF (ref 0–5)

## 2022-03-30 PROCEDURE — 81001 URINALYSIS AUTO W/SCOPE: CPT

## 2022-03-30 PROCEDURE — 36415 COLL VENOUS BLD VENIPUNCTURE: CPT

## 2022-03-30 PROCEDURE — 6370000000 HC RX 637 (ALT 250 FOR IP): Performed by: INTERNAL MEDICINE

## 2022-03-30 PROCEDURE — 6360000002 HC RX W HCPCS: Performed by: NURSE PRACTITIONER

## 2022-03-30 PROCEDURE — 84153 ASSAY OF PSA TOTAL: CPT

## 2022-03-30 PROCEDURE — 6370000000 HC RX 637 (ALT 250 FOR IP): Performed by: CLINICAL NURSE SPECIALIST

## 2022-03-30 PROCEDURE — 85610 PROTHROMBIN TIME: CPT

## 2022-03-30 PROCEDURE — 99232 SBSQ HOSP IP/OBS MODERATE 35: CPT | Performed by: INTERNAL MEDICINE

## 2022-03-30 PROCEDURE — 1200000000 HC SEMI PRIVATE

## 2022-03-30 PROCEDURE — 83735 ASSAY OF MAGNESIUM: CPT

## 2022-03-30 PROCEDURE — 2580000003 HC RX 258: Performed by: CLINICAL NURSE SPECIALIST

## 2022-03-30 PROCEDURE — 6370000000 HC RX 637 (ALT 250 FOR IP): Performed by: UROLOGY

## 2022-03-30 PROCEDURE — 80048 BASIC METABOLIC PNL TOTAL CA: CPT

## 2022-03-30 RX ORDER — TAMSULOSIN HYDROCHLORIDE 0.4 MG/1
0.4 CAPSULE ORAL DAILY
Status: DISCONTINUED | OUTPATIENT
Start: 2022-03-30 | End: 2022-04-01 | Stop reason: HOSPADM

## 2022-03-30 RX ORDER — POTASSIUM CHLORIDE 20 MEQ/1
40 TABLET, EXTENDED RELEASE ORAL EVERY 4 HOURS
Status: DISCONTINUED | OUTPATIENT
Start: 2022-03-30 | End: 2022-03-30

## 2022-03-30 RX ORDER — CIPROFLOXACIN 250 MG/1
250 TABLET, FILM COATED ORAL 2 TIMES DAILY
Status: ON HOLD | COMMUNITY
End: 2022-04-01 | Stop reason: HOSPADM

## 2022-03-30 RX ORDER — ALFUZOSIN HYDROCHLORIDE 10 MG/1
10 TABLET, EXTENDED RELEASE ORAL
Status: DISCONTINUED | OUTPATIENT
Start: 2022-03-30 | End: 2022-03-30

## 2022-03-30 RX ORDER — DILTIAZEM HYDROCHLORIDE 360 MG/1
360 CAPSULE, EXTENDED RELEASE ORAL DAILY
COMMUNITY

## 2022-03-30 RX ADMIN — TAMSULOSIN HYDROCHLORIDE 0.4 MG: 0.4 CAPSULE ORAL at 19:07

## 2022-03-30 RX ADMIN — POTASSIUM BICARBONATE 40 MEQ: 782 TABLET, EFFERVESCENT ORAL at 18:11

## 2022-03-30 RX ADMIN — POLYETHYLENE GLYCOL 3350 17 G: 17 POWDER, FOR SOLUTION ORAL at 09:21

## 2022-03-30 RX ADMIN — POTASSIUM BICARBONATE 40 MEQ: 782 TABLET, EFFERVESCENT ORAL at 10:27

## 2022-03-30 RX ADMIN — POLYETHYLENE GLYCOL 3350 17 G: 17 POWDER, FOR SOLUTION ORAL at 20:33

## 2022-03-30 RX ADMIN — FUROSEMIDE 40 MG: 10 INJECTION, SOLUTION INTRAMUSCULAR; INTRAVENOUS at 09:21

## 2022-03-30 RX ADMIN — DILTIAZEM HYDROCHLORIDE 240 MG: 240 CAPSULE, COATED, EXTENDED RELEASE ORAL at 09:22

## 2022-03-30 RX ADMIN — SODIUM CHLORIDE, PRESERVATIVE FREE 5 ML: 5 INJECTION INTRAVENOUS at 09:15

## 2022-03-30 RX ADMIN — POTASSIUM BICARBONATE 40 MEQ: 782 TABLET, EFFERVESCENT ORAL at 13:18

## 2022-03-30 NOTE — PROGRESS NOTES
Physical Therapy  DATE: 3/30/2022    NAME: Hilary Arte[address] Dr Bhavna Crespo  MRN: [id number]   : 1931    Patient not seen this date for Physical Therapy due to:      [] Cancel by RN or physician due to:    [] Hemodialysis    [] Critical Lab Value Level     [] Blood transfusion in progress    [] Acute or unstable cardiovascular status   _MAP < 55 or more than >115  _HR < 40 or > 130    [] Acute or unstable pulmonary status   -FiO2 > 60%   _RR < 5 or >40    _O2 sats < 85%    [] Strict Bedrest    [] Off Unit for surgery or procedure    [] Off Unit for testing       [] Pending imaging to R/O fracture    [x] Refusal by Patient (Pt. Declined treatment after much encouragement stating \"I am going home tomorrow, I have hired help, I have the right to refuse\". Nursing notified.)    [] Other      [] PT being discontinued at this time. Patient independent. No further needs. [] PT being discontinued at this time as the patient has been transferred to hospice care. No further needs.       Sanjana Saldivar, PTA

## 2022-03-30 NOTE — PROGRESS NOTES
Transitions of Care Pharmacy Service   Medication Review    The patient's list of current home medications has been reviewed. Source(s) of information: darrick, patient    Based on information provided by the above source(s), I have updated the patient's home med list as described below. Please review the ACTION REQUESTED section of this note for any discrepancies on current hospital orders. I changed or updated the following medications on the patient's home medication list:  Discontinued Oxybutynin 5 mg tab     Added Ciprofloxacin 250 mg BID x 7 days      Adjusted   Diltiazem 360 mg ER cap PO QD  Potassium chloride 20 mEq ER cap PO QD     Other Notes Patient has an e-script for a decreased dose of diltiazem to 240 mg that was written on 3/4/2022 but was never filled. Patient states that he wants a \"clear and legit medication list at discharge for someone who is not educated about medications to be able to use and give me my medications\"  When asked patient about who manages his warfarin he relied \" I don't know. I told you no body tells me anything here. Maybe you can recommend something for me\". Patient was on Ciprofloxacin for a UTI infection and is supposed to take it until Thursday 3/31        PROVIDER ACTION REQUESTED  Medications that need to be addressed by a physician/nurse practitioner:    Medication Action Requested   Oxybutynin 5 mg tab   Not a current home med. Please review and d/c if appropriate. Ciprofloxacin 250 mg  Please review and reorder if appropriate         Please feel free to call me with any questions about this encounter. Thank you.     This note will be reviewed and co-signed by the Transitions of Care Pharmacist.    Joan Hartman, CharlesD student  Transitions of Care Pharmacy Service  Phone:  [phone]  Fax: [phone]      Electronically signed by Joan Hartman on 3/30/2022 at 3:31 PM       Prior to Admission medications    Medication Sig Start Date End Date Taking?  Authorizing Provider   ciprofloxacin (CIPRO) 250 MG tablet Take 250 mg by mouth 2 times daily For 7 days   Yes Historical Provider, MD   dilTIAZem (TIAZAC) 360 MG extended release capsule Take 360 mg by mouth daily   Yes Historical Provider, MD   senna-docusate (Dyer Suni) 8.6-50 MG per tablet Take 1 tablet by mouth 2 times daily   Yes Historical Provider, MD   furosemide (LASIX) 40 MG tablet Take 40 mg by mouth daily   Yes Historical Provider, MD   potassium chloride (KLOR-CON M) 20 MEQ TBCR extended release tablet Take 20 mEq by mouth daily    Yes Historical Provider, MD   ferrous sulfate (FE TABS 325) 325 (65 Fe) MG EC tablet Take 325 mg by mouth 2 times daily (with meals)   Yes Historical Provider, MD   magnesium hydroxide (MILK OF MAGNESIA) 400 MG/5ML suspension Take 10 mLs by mouth daily as needed for Constipation   Yes Historical Provider, MD   digoxin (LANOXIN) 125 MCG tablet Take 1 tablet by mouth daily 3/3/22   Genie Dakin, MD   warfarin (COUMADIN) 4 MG tablet Take 4 mg by mouth daily    Historical Provider, MD

## 2022-03-30 NOTE — PROGRESS NOTES
[facility]  Office: [address], DO, Tory Soto, DO, Micaela Soledad, DO, Wade Krause Blood, DO, Hamida Dodd MD, Trina Pollard MD, Regulo Chandler MD, Wesley An MD, Paz Robbins MD, Adan Silva MD, Glory Oliveira MD, Cornelio Blair, DO, Ever Muniz, DO, Elizabeth Hoffman MD,  Elizabeth Jasmine, DO, Salvador Burk MD, Tori Robb MD, Hope Opitz, MD, Carmen Keane, DO, Aime Parker MD, Mark Dickson MD, Shelley Zabala, [facility], [facility] Abel, CNP, Kody Lock, CNP, Diana Rene, CNS, Jeanne Walker, CNP, Dioni Bishop, CNP, Chari Romero, CNP, Nieves Gordillo, CNP, Adwoa Coyle, CNP, Olive Stover PA-C, [facility], [facility], Srinath Mckeon, CNP, Nayely Gaytan, [facility], Jody Noriega, [facility]    Progress Note    3/30/2022    8:26 AM    Name:   Yoav Antunez Me  MRN:     [id number]     Acct:      [de-identified]   Room:   2104/2104-01   Day:  2  Admit Date:  3/28/2022  8:45 PM    PCP:   Santiago Chung DO  Code Status:  Full Code    Subjective:     C/C:   Chief Complaint   Patient presents with    Abdominal Pain     raised ball on abdomen, no pain     Interval History Status: improved. Patient not having regular BMs. Denies any chest pain, shortness of breath, nausea or vomiting, fevers or chills. Brief History: This is a 15-year-old male who presents with a complaint of abdominal pain and distention with reports of no BM for approximately 2 weeks. He has been evaluated general surgery and treated with soapsuds enema with resolution of his constipation. Initially had signs of urinary retention with hydronephrosis and a Reyes catheter was placed. He will undergo void trial once constipation is resolved and functional capacity improves with therapy.     Review of Systems:     Constitutional:  negative for chills, fevers, sweats  Respiratory:  negative for cough, dyspnea on exertion, shortness of breath, wheezing  Cardiovascular:  negative for chest pain, chest pressure/discomfort, lower extremity edema, palpitations  Gastrointestinal:  negative for abdominal pain, constipation, diarrhea, nausea, vomiting  Neurological:  negative for dizziness, headache    Medications: Allergies: Allergies   Allergen Reactions    Lactose Intolerance (Gi)     Sulfa Antibiotics        Current Meds:   Scheduled Meds:    potassium chloride  40 mEq Oral Q4H    dilTIAZem  240 mg Oral Daily    [Held by provider] oxybutynin  5 mg Oral Daily    sodium chloride flush  5-40 mL IntraVENous 2 times per day    polyethylene glycol  17 g Oral BID    warfarin placeholder: dosing by pharmacy   Other RX Placeholder    [Held by provider] magnesium hydroxide  30 mL Oral Daily    furosemide  40 mg IntraVENous Daily    levofloxacin  750 mg IntraVENous Q48H     Continuous Infusions:    sodium chloride Stopped (22 0405)     PRN Meds: sodium chloride flush, sodium chloride, potassium chloride **OR** potassium alternative oral replacement **OR** potassium chloride, magnesium sulfate, ondansetron **OR** ondansetron, acetaminophen **OR** acetaminophen    Data:     Past Medical History:   has a past medical history of Anemia, Atrial fibrillation (HCC), Atrophy of testis, Hyperlipidemia, Hypothyroidism, Osteoporosis, Overactive bladder, Palpitations, Prostate cancer ([facility] [address] Vitamin D deficiency, and Weight loss. Social History:   reports that he has never smoked. He has never used smokeless tobacco. He reports that he does not drink alcohol and does not use drugs. Family History: History reviewed. No pertinent family history.     Vitals:  BP (!) 117/56   Pulse 65   Temp 97.4 °F (36.3 °C) (Oral)   Resp 18   Ht 5' 4\" (1.626 m)   Wt 162 lb (73.5 kg)   SpO2 95%   BMI 27.81 kg/m²   Temp (24hrs), Av.5 °F (36.4 °C), Min:97.4 °F (36.3 °C), Max:97.6 °F (36.4 °C)    No results for input(s): POCGLU in the last 72 General appearance:  alert, cooperative and no distress  Mental Status:  oriented to person, place and time and normal affect  Lungs:  clear to auscultation bilaterally, normal effort  Heart:  regular rate and rhythm, no murmur  Abdomen:  soft, nontender, nondistended, normal bowel sounds, no masses, hepatomegaly, splenomegaly  Extremities:  no edema, redness, tenderness in the calves  Skin:  no gross lesions, rashes, induration    Assessment:        Hospital Problems           Last Modified POA    * (Principal) Stercoral colitis 3/29/2022 Yes    Atrial fibrillation ([address] 3/29/2022 Yes    Chronic anticoagulation 3/29/2022 Yes    Pneumonia of right lower lobe due to infectious organism 3/29/2022 Yes    Moderate malnutrition ([address] 3/29/2022 Yes    Acute urinary retention 3/29/2022 Yes    Bilateral hydronephrosis 3/29/2022 Yes          Plan:        1. Continue stool softeners and laxatives as ordered  2. Urology evaluation  3. Void trial  4. Hold Coumadin as INR is supratherapeutic  5. Discontinue Levaquin  6. Supplement potassium  7. Continue home cardiac medications  8. Monitoring control blood pressure  9.  Discharge planning pending progress with void trial in therapy    Katelyn Kirkpatrick DO  3/30/2022  8:26 AM

## 2022-03-30 NOTE — PROGRESS NOTES
General Surgery:  Daily Progress Note                  PATIENT NAME: Mattie Richards Dr Ashwini Simmons     TODAY'S DATE: 3/30/2022, 5:50 AM  CC:  Abdominal distention     SUBJECTIVE:     Pt seen and examined at bedside. Afebrile, vital signs stable. Status post renal disimpaction as well as to output with enema yesterday. No nausea or emesis. Tolerating clear liquid diet. General: No fevers, chills  Respiratory: No shortness of breath, wheezing  Cardiac: No chest pain, palpitation  Abdomen: No abdominal pain, nausea, emesis    OBJECTIVE:   VITALS:  BP (!) 117/56   Pulse 65   Temp 97.4 °F (36.3 °C) (Oral)   Resp 18   Ht 5' 4\" (1.626 m)   Wt 162 lb (73.5 kg)   SpO2 95%   BMI 27.81 kg/m²      INTAKE/OUTPUT:      Intake/Output Summary (Last 24 hours) at 3/30/2022 0550  Last data filed at 3/30/2022 0231  Gross per 24 hour   Intake --   Output 4975 ml   Net -4975 ml       PHYSICAL EXAM:  General Appearance:  awake, alert, oriented, in no acute distress  HEENT:  Normocephalic, atraumatic, mucus membranes moist   Skin:  Skin color, texture, turgor normal.  Warm and dry.   Lungs:  Normal effort on room air, no respirations, no accessory muscle usage  Heart:  Heart regular rate, irregular rhythm  Abdomen: Soft (improved from day prior), distention improved, nontender to palpation  Extremities: Extremities warm to touch, pink, bilateral lower extremity edema    Data:  CBC:   Lab Results   Component Value Date    WBC 7.0 03/28/2022    RBC 4.12 03/28/2022    HGB 10.5 03/28/2022    HCT 33.7 03/28/2022    MCV 81.8 03/28/2022    MCH 25.5 03/28/2022    MCHC 31.2 03/28/2022    RDW 22.7 03/28/2022     03/28/2022    MPV 9.3 03/28/2022     BMP:    Lab Results   Component Value Date     03/28/2022    K 3.3 03/28/2022    CL 98 03/28/2022    CO2 26 03/28/2022    BUN 24 03/28/2022    LABALBU 2.9 03/28/2022    CREATININE 1.01 03/28/2022    CALCIUM 8.9 03/28/2022    GFRAA >60 03/28/2022    LABGLOM >60 03/28/2022    GLUCOSE 111 03/28/2022       ASSESSMENT:  Active Hospital Problems    Diagnosis Date Noted    Moderate malnutrition ([facility] [address] [E44.0] 03/29/2022    Acute urinary retention [R33.8] 03/29/2022    Bilateral hydronephrosis [N13.30] 03/29/2022    Chronic anticoagulation [Z79.01]     Stercoral colitis [K52.89]     Pneumonia of right lower lobe due to infectious organism [J18.9]     Atrial fibrillation ([facility] [address] [I48.91]      1. 80 y.o. male with constipation, significant stool burden, colitis     Plan:  1. Continue medical mgmt and supportive care per primary  2. Diet: Okay to advance to full liquid diet. Advance diet as tolerated if bowel function continues. 3. Bowel movement yesterday with enema. Continue oral regimen of MiraLAX. Will need to be discharged on consistent MiraLAX regimen. 4. Enemas PRN  5. Monitor bowel function   6. Replace electrolytes PRN  7. PT/OT, OOB, and ambulation as able    Patient's abdomen is much softer today and he is having bowel function. Continue with consistent bowel regimen. Surgery will sign off but be available for any questions or concerns     Ada Woodward DO  General Surgery PGY-3   Attending Physician Statement  I have discussed the case, including pertinent history and exam findings with the resident. I agree with the assessment, plan and orders as documented by the resident. Large stools   Abdomen soft  Oral laxatives. Ambulate.  Advance diet  Will sign off

## 2022-03-30 NOTE — PROGRESS NOTES
Warfarin Dosing - Pharmacy Consult Note  Consulting Provider: Dr. Donald Gonzalez  Indication:  Atrial Fibrillation  Warfarin Dose prior to admission: 4mg daily   Concurrent anticoagulants/antiplatelets: none  Significant Drug Interactions: quinolones (incr INR)  Recent Labs     03/28/22  2115 03/29/22  0524 03/30/22  0629   INR  --  6.7* 8.8*   HGB 10.5*  --   --      --   --    LABALBU 2.9*  --   --      Recent warfarin administrations        No warfarin orders with administrations found. Orders not given:            warfarin placeholder: dosing by pharmacy                   Date   INR    Dose  3/30       8.8       none    Assessment/Plan  (Goal INR: 2 - 3)  INR still above goal. No coumadin today. Active problem list reviewed. INR orders are placed. Chart reviewed for pertinent labs, drug/diet interactions, and past doses. Documentation of patient's clinical condition was reviewed. Pharmacy Dosing:  Pharmacy will continue to follow.

## 2022-03-30 NOTE — PROGRESS NOTES
RN attempted soap suds enema. Patient only able to hold about 400mL total with 2 attempts. Patient had moderate formed, light brown, soft stool. Patient cleaned, barrier cream applied and patient repositioned.

## 2022-03-30 NOTE — CARE COORDINATION
DC Planning    Spoke with pt caregiver Ramon Yani from [facility] is for [facility] home with current services. She informed writer they will transport home privately. Spoke with Dr. Yasemin James to go home at [facility]. Diet advanced today to Low fiber regular, surgery signed off, plan for dc home tomorrow.

## 2022-03-30 NOTE — PLAN OF CARE
Problem: Falls - Risk of:  Goal: Will remain free from falls  Description: Will remain free from falls  3/29/2022 2221 by Debra Johnston RN  Outcome: Ongoing  3/29/2022 1822 by Gail Camp RN  Outcome: Ongoing  Note: Siderails up x 2  Hourly rounding  Call light in reach  Instructed to call for assist before attempting out of bed. Remains free from falls and accidental injury at this time   Floor free from obstacles  Bed is locked and in lowest position  Adequate lighting provided  Bed alarm on, Red [place] star and Stay with Me signs posted  Goal: Absence of physical injury  Description: Absence of physical injury  3/29/2022 2221 by Debra Johnston RN  Outcome: Ongoing  3/29/2022 1822 by Gail Camp RN  Outcome: Ongoing     Problem: Skin Integrity:  Goal: Will show no infection signs and symptoms  Description: Will show no infection signs and symptoms  3/29/2022 2221 by Debra Johnston RN  Outcome: Ongoing  3/29/2022 1822 by Gail Camp RN  Outcome: Ongoing  Note: Checked for incontinence every 2 hours and prn. Pericare as needed. Assisted to reposition off back frequently. On waffle mattress. Heels off bed with pillows.   Goal: Absence of new skin breakdown  Description: Absence of new skin breakdown  3/29/2022 2221 by Debra Jhonston RN  Outcome: Ongoing  3/29/2022 1822 by Gail Camp RN  Outcome: Ongoing     Problem: Nutrition  Goal: Optimal nutrition therapy  3/29/2022 2221 by Debra Johnston RN  Outcome: Ongoing  3/29/2022 1822 by Gail Camp RN  Outcome: Ongoing

## 2022-03-30 NOTE — PROGRESS NOTES
Occupational Therapy  DATE: 3/30/2022    NAME: Lauren Rashidjj Rao Erin Dejesus  MRN: [id number]   : 1931    Patient not seen this date for Occupational Therapy due to:      [] Cancel by RN or physician due to:    [] Hemodialysis    [] Critical Lab Value Level     [] Blood transfusion in progress    [] Acute or unstable cardiovascular status   _MAP < 55 or more than >115  _HR < 40 or > 130    [] Acute or unstable pulmonary status   -FiO2 > 60%   _RR < 5 or >40    _O2 sats < 85%    [] Strict Bedrest    [] Off Unit for surgery or procedure    [] Off Unit for testing       [] Pending imaging to R/O fracture    [x] Refusal by Patient : Pt. Declined treatment after much encouragement stating \"I am going home tomorrow, I have hired help, I have the right to refuse\". Nursing notified. [] Other      [] OT being discontinued at this time. Patient independent. No further needs. [] OT being discontinued at this time as the patient has been transferred to hospice care. No further needs.       CHOLO Garcia

## 2022-03-30 NOTE — CONSULTS
[facility]  Urology Consultation    Patient:  [place] Ayala  Vivienne Asif  MRN: [id number]  YOB: 1931    CHIEF COMPLAINT: Penoscrotal swelling, interstitial edema    HISTORY OF PRESENT ILLNESS:   The patient is a 80 y.o. male who presented to my office with significant penoscrotal swelling, as well as interstitial edema, patient is a retired cardiologist, he was requesting fluid evacuation such as drainage of hydrocele. Upon examination we explained to the patient that most of the fluid that he is witnessing is interstitial and that there is no evidence of any hydrocele that could be aspirated. He has a history of prostatism, bladder outlet obstruction of volume reduction of the prostate with finasteride as well as alpha-blocker for management of obstruction and chronic prostatitis with acute exacerbation    Patient's old records, notes and chart reviewed and summarized above.     Past Medical History:    Past Medical History:   Diagnosis Date    Anemia     Atrial fibrillation (HCC)     Atrophy of testis     Hyperlipidemia     Hypothyroidism     Osteoporosis     Overactive bladder     Palpitations     Prostate cancer ([facility] [address]     Vitamin D deficiency     Weight loss        Past Surgical History:    Past Surgical History:   Procedure Laterality Date    HERNIA REPAIR Right      Previous  surgery: Patient had urinary retention associated with prostate hypertrophy he had significant bleeding associated with bilateral hernia repair few years back that required catheterization and cystoscopy     Medications:    Scheduled Meds:   potassium bicarb-citric acid  40 mEq Oral Q4H    dilTIAZem  240 mg Oral Daily    [Held by provider] oxybutynin  5 mg Oral Daily    sodium chloride flush  5-40 mL IntraVENous 2 times per day    polyethylene glycol  17 g Oral BID    warfarin placeholder: dosing by pharmacy   Other RX Placeholder    [Held by provider] magnesium hydroxide  30 mL Oral Daily    furosemide  40 mg IntraVENous Daily     Continuous Infusions:   sodium chloride Stopped (03/29/22 1673)     PRN Meds:.sodium chloride flush, sodium chloride, potassium chloride **OR** potassium alternative oral replacement **OR** potassium chloride, magnesium sulfate, ondansetron **OR** ondansetron, acetaminophen **OR** acetaminophen    Allergies:  Lactose intolerance (gi) and Sulfa antibiotics    Social History:    Social History     Socioeconomic History    Marital status: Single     Spouse name: Not on file    Number of children: Not on file    Years of education: Not on file    Highest education level: Not on file   Occupational History    Not on file   Tobacco Use    Smoking status: Never Smoker    Smokeless tobacco: Never Used   Vaping Use    Vaping Use: Not on file   Substance and Sexual Activity    Alcohol use: Never    Drug use: Never    Sexual activity: Not on file   Other Topics Concern    Not on file   Social History Narrative    Not on file     Social Determinants of Health     Financial Resource Strain:     Difficulty of Paying Living Expenses: Not on file   Food Insecurity:     Worried About Running Out of Food in the Last Year: Not on file    Salome of Food in the Last Year: Not on file   Transportation Needs:     Lack of Transportation (Medical): Not on file    Lack of Transportation (Non-Medical):  Not on file   Physical Activity:     Days of Exercise per Week: Not on file    Minutes of Exercise per Session: Not on file   Stress:     Feeling of Stress : Not on file   Social Connections:     Frequency of Communication with Friends and Family: Not on file    Frequency of Social Gatherings with Friends and Family: Not on file    Attends Mandaeism Services: Not on file    Active Member of Clubs or Organizations: Not on file    Attends Club or Organization Meetings: Not on file    Marital Status: Not on file   Intimate Partner Violence:     Fear of Current or Ex-Partner: Not on file    Emotionally Abused: Not on file    Physically Abused: Not on file    Sexually Abused: Not on file   Housing Stability:     Unable to Pay for Housing in the Last Year: Not on file    Number of Places Lived in the Last Year: Not on file    Unstable Housing in the Last Year: Not on file       Family History:  History reviewed. No pertinent family history. Previous Urologic Family history: none    REVIEW OF SYSTEMS:  Constitutional: negative  Eyes: negative  Respiratory: negative  Cardiovascular: negative  Gastrointestinal: See history of present illness  Genitourinary: see HPI  Musculoskeletal: negative  Skin: negative   Neurological: negative  Hematological/Lymphatic: negative  Psychological: negative    Physical Exam:    This a 80 y.o. male   Patient Vitals for the past 24 hrs:   BP Temp Temp src Pulse Resp SpO2   03/30/22 0930 (!) 126/52 97.7 °F (36.5 °C) Oral 74 16 96 %   03/29/22 2347 (!) 117/56 97.4 °F (36.3 °C) Oral 65 18 95 %   03/29/22 1855 (!) 123/53 97.6 °F (36.4 °C) Oral 112 16 96 %     Constitutional: Patient in no acute distress; Neuro: alert and oriented to person place and time. Psych: Mood and affect normal.  Skin: Normal  Lungs: Respiratory effort normal  Cardiovascular:  Normal peripheral pulses  Abdomen: Soft, non-tender, non-distended with no CVA, flank pain, hepatosplenomegaly or hernia. Kidneys normal.  Bladder non-tender and not distended.   Lymphatics: no palpable lymphadenopathy  Penis normal and circumcised  Urethral meatus normal  There is significant reduction of the interstitial edema affecting the penile shaft and the scrotal, examination of the testicles did not reveal any testicular masses, no epididymoorchitis    No bladder distention  LABS:  Recent Labs     03/28/22 2115   WBC 7.0   HGB 10.5*   HCT 33.7*   MCV 81.8*        Recent Labs     03/28/22 2115 03/30/22  0629   * 139   K 3.3* 2.5*   CL 98 99   CO2 26 30   BUN 24* 15   CREATININE 1.01 0.70 No results found for: PSA    Additional Lab/culture results:    Urinalysis:   Recent Labs     03/28/22  2245   COLORU Yellow   PHUR 7.5   SPECGRAV 1.015   LEUKOCYTESUR NEGATIVE   UROBILINOGEN Normal   BILIRUBINUR NEGATIVE        -----------------------------------------------------------------  Imaging Results:    Assessment and Plan   Impression:    Patient Active Problem List   Diagnosis    [facility]    Hypokalemia    Hypothyroidism    Overactive bladder    Elevated brain natriuretic peptide (BNP) level    General weakness    Chronic anticoagulation    Stercoral colitis    Pneumonia of right lower lobe due to infectious organism    Moderate malnutrition ([facility] [address]    Acute urinary retention    Bilateral hydronephrosis       Plan: Enlarged prostate with lower urinary tract symptoms, no evidence of urinary retention, penoscrotal interstitial edema resolved as well as lower extremities edema with appropriate diuresis.     A follow-up office  visit will be scheduled for 2 weeks      Santa Kumar MD  2:46 PM 3/30/2022

## 2022-03-31 LAB
ANION GAP SERPL CALCULATED.3IONS-SCNC: 10 MMOL/L (ref 9–17)
BUN BLDV-MCNC: 13 MG/DL (ref 8–23)
BUN/CREAT BLD: 22 (ref 9–20)
CALCIUM SERPL-MCNC: 8 MG/DL (ref 8.6–10.4)
CHLORIDE BLD-SCNC: 99 MMOL/L (ref 98–107)
CO2: 29 MMOL/L (ref 20–31)
CREAT SERPL-MCNC: 0.58 MG/DL (ref 0.7–1.2)
GFR AFRICAN AMERICAN: >60 ML/MIN
GFR NON-AFRICAN AMERICAN: >60 ML/MIN
GFR SERPL CREATININE-BSD FRML MDRD: ABNORMAL ML/MIN/{1.73_M2}
GLUCOSE BLD-MCNC: 97 MG/DL (ref 70–99)
INR BLD: 8.4
MAGNESIUM: 1.6 MG/DL (ref 1.6–2.6)
POTASSIUM SERPL-SCNC: 2.9 MMOL/L (ref 3.7–5.3)
PROTHROMBIN TIME: 69.5 SEC (ref 11.5–14.2)
SODIUM BLD-SCNC: 138 MMOL/L (ref 135–144)

## 2022-03-31 PROCEDURE — 36415 COLL VENOUS BLD VENIPUNCTURE: CPT

## 2022-03-31 PROCEDURE — 6370000000 HC RX 637 (ALT 250 FOR IP): Performed by: UROLOGY

## 2022-03-31 PROCEDURE — 99232 SBSQ HOSP IP/OBS MODERATE 35: CPT | Performed by: INTERNAL MEDICINE

## 2022-03-31 PROCEDURE — 97535 SELF CARE MNGMENT TRAINING: CPT

## 2022-03-31 PROCEDURE — 6370000000 HC RX 637 (ALT 250 FOR IP): Performed by: INTERNAL MEDICINE

## 2022-03-31 PROCEDURE — 83735 ASSAY OF MAGNESIUM: CPT

## 2022-03-31 PROCEDURE — 51798 US URINE CAPACITY MEASURE: CPT

## 2022-03-31 PROCEDURE — 85610 PROTHROMBIN TIME: CPT

## 2022-03-31 PROCEDURE — 97530 THERAPEUTIC ACTIVITIES: CPT

## 2022-03-31 PROCEDURE — 6370000000 HC RX 637 (ALT 250 FOR IP): Performed by: CLINICAL NURSE SPECIALIST

## 2022-03-31 PROCEDURE — 2580000003 HC RX 258: Performed by: CLINICAL NURSE SPECIALIST

## 2022-03-31 PROCEDURE — 1200000000 HC SEMI PRIVATE

## 2022-03-31 PROCEDURE — 80048 BASIC METABOLIC PNL TOTAL CA: CPT

## 2022-03-31 RX ORDER — FUROSEMIDE 40 MG/1
40 TABLET ORAL DAILY
Status: DISCONTINUED | OUTPATIENT
Start: 2022-04-01 | End: 2022-04-01 | Stop reason: HOSPADM

## 2022-03-31 RX ADMIN — TAMSULOSIN HYDROCHLORIDE 0.4 MG: 0.4 CAPSULE ORAL at 11:29

## 2022-03-31 RX ADMIN — SODIUM CHLORIDE, PRESERVATIVE FREE 10 ML: 5 INJECTION INTRAVENOUS at 09:00

## 2022-03-31 RX ADMIN — POTASSIUM BICARBONATE 40 MEQ: 782 TABLET, EFFERVESCENT ORAL at 12:56

## 2022-03-31 RX ADMIN — POLYETHYLENE GLYCOL 3350 17 G: 17 POWDER, FOR SOLUTION ORAL at 11:30

## 2022-03-31 RX ADMIN — SODIUM CHLORIDE, PRESERVATIVE FREE 10 ML: 5 INJECTION INTRAVENOUS at 19:58

## 2022-03-31 RX ADMIN — DILTIAZEM HYDROCHLORIDE 240 MG: 240 CAPSULE, COATED, EXTENDED RELEASE ORAL at 11:30

## 2022-03-31 RX ADMIN — POTASSIUM BICARBONATE 40 MEQ: 782 TABLET, EFFERVESCENT ORAL at 19:51

## 2022-03-31 RX ADMIN — POTASSIUM BICARBONATE 40 MEQ: 782 TABLET, EFFERVESCENT ORAL at 17:58

## 2022-03-31 RX ADMIN — POLYETHYLENE GLYCOL 3350 17 G: 17 POWDER, FOR SOLUTION ORAL at 19:52

## 2022-03-31 NOTE — PROGRESS NOTES
Warfarin Dosing - Pharmacy Consult Note  Consulting Provider: Dr. Pardeep Lorenz  Indication:  Atrial Fibrillation  Warfarin Dose prior to admission: 4mg daily   Concurrent anticoagulants/antiplatelets: none  Significant Drug Interactions: quinolones (incr INR)  Recent Labs     03/28/22  2115 03/29/22  0524 03/30/22  0629 03/31/22  0749   INR  --  6.7* 8.8* 8.4*   HGB 10.5*  --   --   --      --   --   --    LABALBU 2.9*  --   --   --      Recent warfarin administrations        No warfarin orders with administrations found. Orders not given:            warfarin placeholder: dosing by pharmacy                   Date   INR    Dose  3/30       8.8       none  3/31       8.4       none    Assessment/Plan  (Goal INR: 2 - 3)  INR still above goal. No coumadin today. Active problem list reviewed. INR orders are placed. Chart reviewed for pertinent labs, drug/diet interactions, and past doses. Documentation of patient's clinical condition was reviewed. Pharmacy Dosing:  Pharmacy will continue to follow.

## 2022-03-31 NOTE — PLAN OF CARE
Problem: Falls - Risk of:  Goal: Will remain free from falls  Description: Will remain free from falls  Outcome: Ongoing  Goal: Absence of physical injury  Description: Absence of physical injury  Outcome: Ongoing     Problem: Skin Integrity:  Goal: Will show no infection signs and symptoms  Description: Will show no infection signs and symptoms  Outcome: Ongoing  Goal: Absence of new skin breakdown  Description: Absence of new skin breakdown  Outcome: Ongoing     Problem: Nutrition  Goal: Optimal nutrition therapy  Outcome: Ongoing     Problem: Urinary Retention:  Goal: Urinary elimination within specified parameters  Description: Urinary elimination within specified parameters  Outcome: Ongoing  Goal: Able to perform urinary catheter care  Description: Able to perform urinary catheter care  Outcome: Ongoing  Goal: Able to perform urinary self-catheterization  Description: Able to perform urinary self-catheterization  Outcome: Ongoing  Goal: Ability to reestablish a normal urinary elimination pattern will improve - after catheter removal  Description: Ability to reestablish a normal urinary elimination pattern will improve - after catheter removal  Outcome: Ongoing  Goal: Ability to recognize the need to void and respond appropriately will improve  Description: Ability to recognize the need to void and respond appropriately will improve  Outcome: Ongoing  Goal: Absence of postvoid residual urine  Description: Absence of postvoid residual urine  Outcome: Ongoing

## 2022-03-31 NOTE — PROGRESS NOTES
Occupational Therapy  DATE: 3/31/2022    NAME: Rose Old  Whit Peralta  MRN: [id number]   : 1931    Patient not seen this date for Occupational Therapy due to:      [] Cancel by RN or physician due to:    [] Hemodialysis    [] Critical Lab Value Level     [] Blood transfusion in progress    [] Acute or unstable cardiovascular status   _MAP < 55 or more than >115  _HR < 40 or > 130    [] Acute or unstable pulmonary status   -FiO2 > 60%   _RR < 5 or >40    _O2 sats < 85%    [] Strict Bedrest    [] Off Unit for surgery or procedure    [] Off Unit for testing       [] Pending imaging to R/O fracture    [x] Refusal by Patient : Pt. Declined treatment stating \"No,No,No! Good Bye! \" Nursing notified. [] Other      []OT being discontinued at this time. Patient independent. No further needs. []OT being discontinued at this time as the patient has been transferred to hospice care. No further needs.       CHOLO Londono

## 2022-03-31 NOTE — PROGRESS NOTES
Occupational Therapy  Facility/Department: [facility]  Daily Treatment Note  NAME: Cherelle Reyes  : 1931  MRN: [id number]    Date of Service: 3/31/2022    Discharge Recommendations:  Patient would benefit from continued therapy after discharge  OT Equipment Recommendations  ADL Assistive Devices: Grab Bars - shower; Reacher;Sock-Aid Soft;Long-handled Shoe Horn;Long-handled Sponge;Emergency Alert System    Assessment   Performance deficits / Impairments: Decreased functional mobility ; Decreased safe awareness;Decreased balance;Decreased coordination;Decreased ADL status; Decreased cognition;Decreased vision/visual deficit; Decreased posture;Decreased endurance;Decreased high-level IADLs;Decreased strength  Assessment: Pt. completed functional transfer up to bedside chair with mod assist with use of RW. Pt. requires much encouragement to work with therapy and is very self limiting to what he will participate in. Pt. displayed weakness with increased fatigue with simple tasks. Skilled OT warranted to promote I/safety to return pt to prior living arrangement with assist as needed. Prognosis: Fair  OT Education: Orientation;OT Role;Plan of Care;Energy Conservation;Transfer Training  Patient Education: Pt. educated on proper use of RW and role of OT to promote strength and mobility for self care. Barriers to Learning: memory and cognitive deficits  REQUIRES OT FOLLOW UP: Yes  Activity Tolerance  Activity Tolerance: Patient limited by fatigue;Treatment limited secondary to decreased cognition  Activity Tolerance: poor+  Safety Devices  Safety Devices in place: Yes  Type of devices: Call light within reach; Chair alarm in place; Left in chair;Patient at risk for falls;Gait belt;Nurse notified         Patient Diagnosis(es): The primary encounter diagnosis was Acute colitis. A diagnosis of Constipation, unspecified constipation type was also pertinent to this visit.       has a past medical history of Anemia, Atrial fibrillation ([facility] [address] Atrophy of testis, Hyperlipidemia, Hypothyroidism, Osteoporosis, Overactive bladder, Palpitations, Prostate cancer ([facility] [address] Vitamin D deficiency, and Weight loss. has a past surgical history that includes hernia repair (Right). Restrictions  Restrictions/Precautions  Restrictions/Precautions: General Precautions,Fall Risk  Required Braces or Orthoses?: No  Position Activity Restriction  Other position/activity restrictions: Up with assist, clear liquid diet, heels off bed at all times, RUE IV, catheter, alarms  Subjective   General  Chart Reviewed: Yes  Patient assessed for rehabilitation services?: Yes  Additional Pertinent Hx: Pt. agreeable for treatment  Response to previous treatment: Patient with no complaints from previous session  Family / Caregiver Present: Yes (Caregiver)      Orientation  Orientation  Overall Orientation Status: Impaired  Orientation Level: Oriented to place; Disoriented to time;Disoriented to situation;Oriented to person  Objective    ADL  Feeding: Setup  Additional Comments: declined ADLS        Balance  Sitting Balance: Contact guard assistance  Standing Balance: Moderate assistance  Standing Balance  Time: stand kike 1-2 mins with RW for functional tasks  Activity: ADL transfer  Comment: with use of RW  Functional Mobility  Functional - Mobility Device: Rolling Walker  Assist Level: Moderate assistance (x2)  Functional Mobility Comments: Max verbal cues provided for proper use of RW and bearing wt with arms to properly use walker for support. Toilet Transfers  Toilet Transfers Comments: NT  Bed mobility  Supine to Sit: Moderate assistance;2 Person assistance  Scooting: Moderate assistance;2 Person assistance  Comment: Pt. agreed to get out of bed and sit up in bedside chair for lunch. Mod assist x2 for supine to sit. Transfers  Stand Step Transfers: Moderate assistance;2 Person assistance  Sit to stand:  Moderate assistance;2 Person assistance  Stand to sit: Moderate assistance;2 Person assistance  Transfer Comments: Positive encouragement provided to use RW for support during transfer. Pt. completed sit to stand with mod assist x2. Max verbal cues required for proper use of walker and  for reaching back for sitting surface. Cognition  Overall Cognitive Status: Exceptions  Arousal/Alertness: Delayed responses to stimuli  Following Commands: Follows one step commands with increased time; Follows one step commands with repetition  Attention Span: Attends with cues to redirect  Memory: Decreased recall of recent events;Decreased short term memory  Safety Judgement: Decreased awareness of need for safety;Decreased awareness of need for assistance  Problem Solving: Assistance required to identify errors made;Assistance required to implement solutions;Assistance required to correct errors made;Assistance required to generate solutions  Insights: Decreased awareness of deficits  Initiation: Requires cues for all  Sequencing: Requires cues for some     Perception  Overall Perceptual Status: [facility]      Plan   Plan  Times per week: 4-5x/week 1x/day as kike  Times per day: Daily  Current Treatment Recommendations: Strengthening,Balance Training,Functional Mobility Training,Safety Education & Jarett Lange Re-education,Patient/Caregiver Education & Training,Equipment Evaluation, Education, & procurement,Cognitive Reorientation,Self-Care / ADL,Cognitive/Perceptual Training  Plan Comment: Cont with stated POC           AM-PAC Score        AM-PAC Inpatient Daily Activity Raw Score: 12 (03/31/22 1242)  AM-PAC Inpatient ADL T-Scale Score : 30.6 (03/31/22 1242)  ADL Inpatient CMS 0-100% Score: 66.57 (03/31/22 1242)  ADL Inpatient CMS G-Code Modifier : CL (03/31/22 1242)    Goals  Short term goals  Time Frame for Short term goals: by discharge, pt to demo  Short term goal 1: bed mob tasks with use of rail as needed to mod assist of 1.  Short term goal 2: increase BUE strength by a 1/2 grade to assist with ADL tasks. Short term goal 3: UB ADL to set up and LB ADL to mod assist of 1 and use of AD/AE as needed. Short term goal 4: ADL transfers and functional mob with AD to min assist of 1. Short term goal 5: toileting tasks with use of AD/grab bar to mod assist of 1. Long term goals  Long term goal 1: Pt to stand with CG and AD and kike > 4 mins as able to reduce falls in function. Long term goal 2: Caregivers to be I with pressure relief, BUE HEP, EC/WS and fall prevention tech, DME/AE recommendations with use of handouts. Patient Goals   Patient goals : Pt was unable to state however per  get him up OOB and moving better! Therapy Time   Individual Concurrent Group Co-treatment   Time In 5         Time Out 200         Minutes 15              Co-treatment with PT warranted secondary to decreased safety and independence requiring 2 skilled therapy professionals to address individual discipline's goals. OT addressing preparation for ADL transfer, fall prevention, functional mobility for ADL transfers and ability to sequence and follow directions.     Berton Goltz, OTA

## 2022-03-31 NOTE — PROGRESS NOTES
Josselyn Ferrell   Urology Progress Note            Subjective: Follow-up urinary retention, failed void trial    Patient Vitals for the past 24 hrs:   BP Temp Temp src Pulse Resp SpO2 Weight   03/31/22 0117 -- -- -- -- -- -- 167 lb (75.8 kg)   03/30/22 1958 (!) 134/55 97.3 °F (36.3 °C) Oral 52 18 97 % --   03/30/22 1630 (!) 150/65 97.1 °F (36.2 °C) Oral 72 16 95 % --   03/30/22 0930 (!) 126/52 97.7 °F (36.5 °C) Oral 74 16 96 % --       Intake/Output Summary (Last 24 hours) at 3/31/2022 0729  Last data filed at 3/31/2022 0510  Gross per 24 hour   Intake --   Output 2501 ml   Net -2501 ml       Recent Labs     03/28/22 2115   WBC 7.0   HGB 10.5*   HCT 33.7*   MCV 81.8*        Recent Labs     03/28/22 2115 03/30/22  0629   * 139   K 3.3* 2.5*   CL 98 99   CO2 26 30   BUN 24* 15   CREATININE 1.01 0.70       Recent Labs     03/30/22  1845   COLORU Yellow   PHUR 7.0   WBCUA 0 TO 2   RBCUA 10 TO 20   SPECGRAV 1.015   LEUKOCYTESUR NEGATIVE   UROBILINOGEN Normal   BILIRUBINUR NEGATIVE       Additional Lab/culture results:    Physical Exam: Postvoid residual greater than 700 mL yesterday, catheter was not reinserted. Patient was started on Uroxatrol 10 mg he is allergic to sulfa and Flomax. Catheter removal again today for void trial    Interval Imaging Findings:    Impression:    Patient Active Problem List   Diagnosis    Atrial fibrillation (HCC)    Hypokalemia    Hypothyroidism    Overactive bladder    Elevated brain natriuretic peptide (BNP) level    General weakness    Chronic anticoagulation    Stercoral colitis    Pneumonia of right lower lobe due to infectious organism    Moderate malnutrition ([address]    Acute urinary retention    Bilateral hydronephrosis       Plan:  We will bladder scan around noontime to check postvoid residual    Que Buck MD  7:29 AM 3/31/2022

## 2022-03-31 NOTE — PROGRESS NOTES
Physical Therapy  Facility/Department: [facility]  Daily Treatment Note  NAME: Hilary Arteaga Dr Bhavna Crespo  : 1931  MRN: [id number]    Date of Service: 3/31/2022    Discharge Recommendations:  Pt currently functioning below baseline. Would suggest additional therapy at time of discharge to maximize long term outcomes and prevent re-admission. Please refer to AM-PAC score for current level of function. Assessment   Pt currently refusing all PT services and only agrees to get up into chair for lunch. States \"no no I do not need therapy. \" Pt required Mod assist x2 for safe transfer as pt demos POOR safety awareness and is a high fall risk due to this. Required cues throughout for safe tech and safe use of RW. Body structures, Functions, Activity limitations: Decreased functional mobility ; Decreased strength;Decreased safe awareness;Decreased endurance;Decreased balance;Decreased posture;Decreased ADL status  Prognosis: Good  Decision Making: High Complexity  PT Education: Goals;PT Role;Plan of Care;Transfer Training;Functional Mobility Training;General Safety; Energy Conservation;Pressure Relief;Gait Training  Patient Education: Ed pt on functional mobility, safety awareness, importance of being up & OOB to regain strength, & prevention of sedentary complications, circulation ex's,  pressure relief & optimal breathing techniques  REQUIRES PT FOLLOW UP: Yes  Activity Tolerance  Activity Tolerance: Treatment limited secondary to agitation;Patient limited by endurance     Patient Diagnosis(es): The primary encounter diagnosis was Acute colitis. A diagnosis of Constipation, unspecified constipation type was also pertinent to this visit. has a past medical history of Anemia, Atrial fibrillation (HCC), Atrophy of testis, Hyperlipidemia, Hypothyroidism, Osteoporosis, Overactive bladder, Palpitations, Prostate cancer ([facility] [address] Vitamin D deficiency, and Weight loss.    has a past surgical history that includes hernia repair (Right). Restrictions  Restrictions/Precautions  Restrictions/Precautions: General Precautions,Fall Risk  Required Braces or Orthoses?: No  Position Activity Restriction  Other position/activity restrictions: Up with assist, clear liquid diet, heels off bed at all times, RUE IV, catheter, alarms  Subjective   General  Chart Reviewed: Yes  Additional Pertinent Hx: anemia, atrial fib  Family / Caregiver Present: Yes  Subjective  Subjective: Pt states \"I do not need therapy, go away\" but is agreeable with getting up into chair for lunch. General Comment  Comments: RN jef PT          Orientation  Orientation  Overall Orientation Status: Impaired  Cognition      Objective   Bed mobility  Rolling to Left: Stand by assistance  Rolling to Right: Stand by assistance  Supine to Sit: Moderate assistance;2 Person assistance  Scooting: Moderate assistance;2 Person assistance  Comment: Pt agreeable with getting into recliner for lunch, mod x2 assist for sup>sit with cues for hand placement of hand rails and sequencing. Transfers  Sit to Stand: Moderate Assistance;2 Person Assistance  Stand to sit: Moderate Assistance;2 Person Assistance  Bed to Chair: Moderate assistance;2 Person Assistance  Stand Pivot Transfers: Moderate Assistance;2 Person Assistance  Lateral Transfers: Moderate Assistance;2 Person Assistance  Comment: Pt demos unsafe tech requiring mod assist x2 throughout transfer with cues to keep RW on ground at ALL times, pushing up from bed and holding onto RW upon standing, and to feel chair behind legs/reach back before sitting down.   Ambulation  Ambulation?: No (able to take a few steps to get into chair, mod x2 assist)    AM-PAC Score     AM-PAC Inpatient Mobility without Stair Climbing Raw Score : 12 (03/31/22 1316)  AM-PAC Inpatient without Stair Climbing T-Scale Score : 37.26 (03/31/22 1316)  Mobility Inpatient CMS 0-100% Score: 63.03 (03/31/22 1316)  Mobility Inpatient without Stair CMS G-Code Modifier : CL (03/31/22 1316)     Goals  Short term goals  Time Frame for Short term goals: 12 visits  Short term goal 1: Inc bed-mobility & transfers to independent to enable pt to safely get in/OOB & chair  Short term goal 2: Inc gait to amb 150ft or > indep w/ RW to enable pt to return to previous level of independence & able to demonstrate indep/ safe use of RW in functional activities including bending/ reaching, approaching counters and turning to sit. Short term goal 3: Inc strength to [facility] & standing balance to good with device to facilitate pt independence for performance of ADL's & functional mobility, & reduce fall risk  Short term goal 4: Pt able to tolerate 30-40 min of activity to include 15-20 reps of ex, NMR & functional mobility with device to facilitate activity tolerance to [facility]  Short term goal 5: Ed pt on home ex's, safety & energy principles, fall prevention, & issue written home program    Plan    Plan  Times per week: 1-2x/D, 5-6D/week  Current Treatment Recommendations: Strengthening,Balance Training,Functional Mobility Training,Transfer Training,Endurance Training,Gait Training,Home Exercise Program,Safety Education & Training,Patient/Caregiver Education & Training  Safety Devices  Type of devices: Call light within reach,Gait belt,Chair alarm in place,Patient at risk for falls,Left in chair,Nurse notified     Therapy Time   Individual Concurrent Group Co-treatment   Time In 5         Time Out 18         Minutes 15            Co-treatment with OT warranted secondary to decreased safety and independence requiring 2 skilled therapy professionals to address individual discipline's goals. PT addressing weight shifting prior to transfers and transfer training.     Woody Dsouza, PTA

## 2022-03-31 NOTE — PROGRESS NOTES
Updated Dr Bakari Bonilla on patient unable to void and bladder scan around 250 mL. Instructed to continue to monitor and bladder scan in a couple of hours.

## 2022-03-31 NOTE — PROGRESS NOTES
[facility]  Office: [address], DO, Monica Hauser, DO, Brittny Mercado, DO, Claribel Mejia Blood, DO, Alda rCespo MD, Madelyn Henriquez MD, Lou Ybarra MD, Mirna Coates MD, Akanksha Benitez MD, Steven Cassidy MD, Jaylene Powell MD, Mathieu Dubose, DO, Shaniqua Turcios, DO, Leigha Mehta MD,  Rhonda Gage, DO, Faraz Pritchard MD, Miah Solano MD, Shivani Joyce MD, Connie Han, DO, Amada Bolanos MD, Mary Anne Nieves MD, Maris Ayala, CNP, [facility], CNP, Swapna Rizzo, CNP, Jaqueline Carter, CNS, Susanna Edwards, CNP, Arsalan Sanchez, CNP, Anurag Littlejohn, CNP, Miquel Payne, CNP, Debbi Edwards, CNP, Bobby Camejo PA-C, Po Mckenize DNP, Ron Heller DNP, Odalys Rosas, CNP, Oscar Erazo [facility], Manasa Billings, [facility]    Progress Note    3/31/2022    8:07 AM    Name:   Malachi Easley  MRN:     [id number]     Acct:      [de-identified]   Room:   2015/2015-01   Day:  3  Admit Date:  3/28/2022  8:45 PM    PCP:   Ellie Lima DO  Code Status:  Full Code    Subjective:     C/C:   Chief Complaint   Patient presents with    Abdominal Pain     raised ball on abdomen, no pain     Interval History Status: improved. Patient is resting comfortably, Reyes placed yesterday for recurrence of retention. Denies any chest pain, shortness of breath, nausea vomiting, fevers or chills or acute complaints. Brief History: This is a 80-year-old male who presents with a complaint of abdominal pain and distention with reports of no BM for approximately 2 weeks. He has been evaluated general surgery and treated with soapsuds enema with resolution of his constipation. Initially had signs of urinary retention with hydronephrosis and a Reyes catheter was placed. He will undergo void trial once constipation is resolved and functional capacity improves with therapy.     Review of Systems:     Constitutional:  negative for chills, fevers, sweats  Respiratory:  negative for cough, dyspnea on exertion, shortness of breath, wheezing  Cardiovascular:  negative for chest pain, chest pressure/discomfort, lower extremity edema, palpitations  Gastrointestinal:  negative for abdominal pain, constipation, diarrhea, nausea, vomiting  Neurological:  negative for dizziness, headache    Medications: Allergies: Allergies   Allergen Reactions    Lactose Intolerance (Gi)     Sulfa Antibiotics        Current Meds:   Scheduled Meds:    tamsulosin  0.4 mg Oral Daily    dilTIAZem  240 mg Oral Daily    [Held by provider] oxybutynin  5 mg Oral Daily    sodium chloride flush  5-40 mL IntraVENous 2 times per day    polyethylene glycol  17 g Oral BID    warfarin placeholder: dosing by pharmacy   Other RX Placeholder    [Held by provider] magnesium hydroxide  30 mL Oral Daily    furosemide  40 mg IntraVENous Daily     Continuous Infusions:    sodium chloride Stopped (22 0405)     PRN Meds: sodium chloride flush, sodium chloride, potassium chloride **OR** potassium alternative oral replacement **OR** potassium chloride, magnesium sulfate, ondansetron **OR** ondansetron, acetaminophen **OR** acetaminophen    Data:     Past Medical History:   has a past medical history of Anemia, Atrial fibrillation ([facility] [address] Atrophy of testis, Hyperlipidemia, Hypothyroidism, Osteoporosis, Overactive bladder, Palpitations, Prostate cancer ([facility] [address] Vitamin D deficiency, and Weight loss. Social History:   reports that he has never smoked. He has never used smokeless tobacco. He reports that he does not drink alcohol and does not use drugs. Family History: History reviewed. No pertinent family history.     Vitals:  BP 84/71   Pulse 97   Temp 98.2 °F (36.8 °C) (Axillary)   Resp 16   Ht 5' 4\" (1.626 m)   Wt 167 lb (75.8 kg)   SpO2 95%   BMI 28.67 kg/m²   Temp (24hrs), Av.6 °F (36.4 °C), Min:97.1 °F (36.2 °C), Max:98.2 °F (36.8 °C)    No results for input(s): POCGLU in the last 72 hours. I/O (24Hr): Intake/Output Summary (Last 24 hours) at 3/31/2022 0807  Last data filed at 3/31/2022 0510  Gross per 24 hour   Intake --   Output 2501 ml   Net -2501 ml       Labs:  Hematology:  Recent Labs     03/28/22 2115 03/29/22 0524 03/30/22  0629   WBC 7.0  --   --    RBC 4.12*  --   --    HGB 10.5*  --   --    HCT 33.7*  --   --    MCV 81.8*  --   --    MCH 25.5  --   --    MCHC 31.2  --   --    RDW 22.7*  --   --      --   --    MPV 9.3  --   --    INR  --  6.7* 8.8*     Chemistry:  Recent Labs     03/28/22 2115 03/30/22 0629 03/30/22  1506   * 139  --    K 3.3* 2.5*  --    CL 98 99  --    CO2 26 30  --    GLUCOSE 111* 103*  --    BUN 24* 15  --    CREATININE 1.01 0.70  --    MG 2.0 1.7  --    ANIONGAP 10 10  --    LABGLOM >60 >60  --    GFRAA >60 >60  --    CALCIUM 8.9 8.5*  --    PSA  --   --  <0.02     Recent Labs     03/28/22 2115 03/29/22 0524   PROT 6.2*  --    LABALBU 2.9*  --    TSH  --  2.67   AST 25  --    ALT 19  --    ALKPHOS 125  --    BILITOT 0.97  --      ABG:No results found for: POCPH, PHART, PH, POCPCO2, NMW4ZXL, PCO2, POCPO2, PO2ART, PO2, POCHCO3, LMP6IWV, HCO3, NBEA, PBEA, BEART, BE, THGBART, THB, QOI9YTD, RRGO1QQA, U8TZCTXV, O2SAT, FIO2  Lab Results   Component Value Date/Time    SPECIAL ROOM 130 03/15/2022 05:15 PM     Lab Results   Component Value Date/Time    CULTURE MORGANELLA MORGANII >[id number] CFU/ML (A) 03/15/2022 05:15 PM    CULTURE  03/15/2022 05:15 PM     GRAM NEGATIVE RODS 50 to 100,000 CFU/ML Susceptibility testing not performed on low colony count organisms. Radiology:  CT ABDOMEN PELVIS WO CONTRAST Additional Contrast? None    Result Date: 3/28/2022  1. Large amount of stool within the distal colon and rectum with wall thickening, concerning for stercoral colitis. 2. Marked distention of the urinary bladder with bilateral hydroureteronephrosis, concerning for urinary outlet obstruction. No nephrolithiasis.  3. Bilateral pleural effusions with adjacent consolidation, atelectasis versus pneumonia. Physical Examination:        General appearance:  alert, cooperative and no distress  Mental Status:  oriented to person, place and time and normal affect  Lungs:  clear to auscultation bilaterally, normal effort  Heart:  regular rate and rhythm, no murmur  Abdomen:  soft, nontender, nondistended, normal bowel sounds, no masses, hepatomegaly, splenomegaly  Extremities:  no edema, redness, tenderness in the calves  Skin:  no gross lesions, rashes, induration    Assessment:        Hospital Problems           Last Modified POA    * (Principal) Stercoral colitis 3/29/2022 Yes    Atrial fibrillation ([address] 3/29/2022 Yes    Chronic anticoagulation 3/29/2022 Yes    Pneumonia of right lower lobe due to infectious organism 3/29/2022 Yes    Moderate malnutrition ([address] 3/29/2022 Yes    Acute urinary retention 3/29/2022 Yes    Bilateral hydronephrosis 3/29/2022 Yes          Plan:        1. Reyes management per urology  2. Stool softener laxatives as ordered  3. GI and DVT prophylaxis   4. cardiac medications as ordered  5. Continue to hold Coumadin  6. Antihypertensives as ordered  7.  Discharge planning pending progress    Juliane Bautista DO  3/31/2022  8:07 AM

## 2022-04-01 VITALS
SYSTOLIC BLOOD PRESSURE: 125 MMHG | HEART RATE: 77 BPM | HEIGHT: 64 IN | DIASTOLIC BLOOD PRESSURE: 53 MMHG | TEMPERATURE: 97.9 F | RESPIRATION RATE: 19 BRPM | OXYGEN SATURATION: 97 % | WEIGHT: 167 LBS | BODY MASS INDEX: 28.51 KG/M2

## 2022-04-01 LAB
INR BLD: 6.5
PROTHROMBIN TIME: 57.1 SEC (ref 11.5–14.2)

## 2022-04-01 PROCEDURE — 85610 PROTHROMBIN TIME: CPT

## 2022-04-01 PROCEDURE — 6370000000 HC RX 637 (ALT 250 FOR IP): Performed by: INTERNAL MEDICINE

## 2022-04-01 PROCEDURE — 2580000003 HC RX 258: Performed by: CLINICAL NURSE SPECIALIST

## 2022-04-01 PROCEDURE — 99232 SBSQ HOSP IP/OBS MODERATE 35: CPT | Performed by: INTERNAL MEDICINE

## 2022-04-01 PROCEDURE — 36415 COLL VENOUS BLD VENIPUNCTURE: CPT

## 2022-04-01 PROCEDURE — 6370000000 HC RX 637 (ALT 250 FOR IP): Performed by: UROLOGY

## 2022-04-01 PROCEDURE — 51702 INSERT TEMP BLADDER CATH: CPT

## 2022-04-01 PROCEDURE — 6370000000 HC RX 637 (ALT 250 FOR IP): Performed by: CLINICAL NURSE SPECIALIST

## 2022-04-01 RX ORDER — TAMSULOSIN HYDROCHLORIDE 0.4 MG/1
0.4 CAPSULE ORAL DAILY
Qty: 30 CAPSULE | Refills: 3 | Status: SHIPPED | OUTPATIENT
Start: 2022-04-02

## 2022-04-01 RX ORDER — POLYETHYLENE GLYCOL 3350 17 G/17G
17 POWDER, FOR SOLUTION ORAL 2 TIMES DAILY
Qty: 527 G | Refills: 1 | COMMUNITY
Start: 2022-04-01 | End: 2022-05-01

## 2022-04-01 RX ADMIN — TAMSULOSIN HYDROCHLORIDE 0.4 MG: 0.4 CAPSULE ORAL at 08:55

## 2022-04-01 RX ADMIN — POLYETHYLENE GLYCOL 3350 17 G: 17 POWDER, FOR SOLUTION ORAL at 08:56

## 2022-04-01 RX ADMIN — DILTIAZEM HYDROCHLORIDE 240 MG: 240 CAPSULE, COATED, EXTENDED RELEASE ORAL at 08:55

## 2022-04-01 RX ADMIN — SODIUM CHLORIDE, PRESERVATIVE FREE 10 ML: 5 INJECTION INTRAVENOUS at 09:02

## 2022-04-01 RX ADMIN — FUROSEMIDE 40 MG: 40 TABLET ORAL at 08:55

## 2022-04-01 ASSESSMENT — PAIN SCALES - GENERAL: PAINLEVEL_OUTOF10: 0

## 2022-04-01 NOTE — PROGRESS NOTES
CLINICAL PHARMACY NOTE: MEDS TO BEDS    Total # of Prescriptions Filled: 1   The following medications were delivered to the patient:  · tamsulosin 0.4mg    Additional Documentation:  Pt bought otc botlles of generic miralax

## 2022-04-01 NOTE — PROGRESS NOTES
[facility]  Office: [address], DO, Bella Jennifer, DO, Juan Younger, DO, Debra Queen, DO, Fer Mckeon MD, Yuri Farah MD, Alaina Ca MD, Driss Bryson MD, Yajaira Toussaint MD, Eyal Pineda MD, Shilpa Burroughs MD, Aleksandr Desir, DO, Teodoro Cardenas, DO, Gen Rice MD,  Berta Echeverria, DO, Puma Louie MD, Erica Pascal MD, Es Mendoza MD, Derick Shepherd DO, Ernestina Pillai MD, Dani Beltrán MD, Alex Flannery, [facility], [facility] Abel, CNP, Bladimir Savage, [facility], Rupinder Lemus, CNS, Nery Mcnulty, CNP, Jerzy Peter, CNP, Albina Mauro, CNP, Melecio Allred, [facility], Pawan Han, CNP, David Muniz PA-C, Susie Linares, [facility], Hugo Phoenix, OSMAN, Tye Clark, CNP, Jacklyn Frankel, CNP, Magali Lott, [facility]    Progress Note    4/1/2022    11:29 AM    Name:   Jasmin Crane Dr Beka Moorein  MRN:     [id number]     [place]:      [de-identified]   Room:   2015/2015-01   Day:  4  Admit Date:  3/28/2022  8:45 PM    PCP:   Hany Martell DO  Code Status:  Full Code    Subjective:     C/C:   Chief Complaint   Patient presents with    Abdominal Pain     raised ball on abdomen, no pain     Interval History Status: improved. Patient is resting comfortably denies any complaints of chest pain, shortness of breath, nausea vomiting, fever chills or acute complaints. Brief History: This is a 80-year-old male who presents with a complaint of abdominal pain and distention with reports of no BM for approximately 2 weeks. Gardenia Barrera has been evaluated general surgery and treated with soapsuds enema with resolution of his constipation.  Initially had signs of urinary retention with hydronephrosis and a Reyes catheter was placed. Gardenia Barrera will undergo void trial once constipation is resolved and functional capacity improves with therapy.     Review of Systems:     Constitutional:  negative for chills, fevers, sweats  Respiratory:  negative for cough, dyspnea on exertion, shortness of breath, wheezing  Cardiovascular:  negative for chest pain, chest pressure/discomfort, lower extremity edema, palpitations  Gastrointestinal:  negative for abdominal pain, constipation, diarrhea, nausea, vomiting  Neurological:  negative for dizziness, headache    Medications: Allergies: Allergies   Allergen Reactions    Lactose Intolerance (Gi)     Sulfa Antibiotics        Current Meds:   Scheduled Meds:    furosemide  40 mg Oral Daily    tamsulosin  0.4 mg Oral Daily    dilTIAZem  240 mg Oral Daily    [Held by provider] oxybutynin  5 mg Oral Daily    sodium chloride flush  5-40 mL IntraVENous 2 times per day    polyethylene glycol  17 g Oral BID    warfarin placeholder: dosing by pharmacy   Other RX Placeholder    [Held by provider] magnesium hydroxide  30 mL Oral Daily     Continuous Infusions:    sodium chloride Stopped (22 0405)     PRN Meds: sodium chloride flush, sodium chloride, potassium chloride **OR** potassium alternative oral replacement **OR** potassium chloride, magnesium sulfate, ondansetron **OR** ondansetron, acetaminophen **OR** acetaminophen    Data:     Past Medical History:   has a past medical history of Anemia, Atrial fibrillation ([address] Atrophy of testis, Hyperlipidemia, Hypothyroidism, Osteoporosis, Overactive bladder, Palpitations, Prostate cancer [address] [address] Vitamin D deficiency, and Weight loss. Social History:   reports that he has never smoked. He has never used smokeless tobacco. He reports that he does not drink alcohol and does not use drugs. Family History: History reviewed. No pertinent family history. Vitals:  BP (!) 121/57   Pulse 146   Temp 97.2 °F (36.2 °C) (Oral)   Resp 15   Ht 5' 4\" (1.626 m)   Wt 167 lb (75.8 kg)   SpO2 96%   BMI 28.67 kg/m²   Temp (24hrs), Av.7 °F (36.5 °C), Min:97.2 °F (36.2 °C), Max:98.1 °F (36.7 °C)    No results for input(s): POCGLU in the last 72 hours.     I/O (24Hr): Intake/Output Summary (Last 24 hours) at 4/1/2022 1129  Last data filed at 4/1/2022 0903  Gross per 24 hour   Intake 120 ml   Output 750 ml   Net -630 ml       Labs:  Hematology:  Recent Labs     03/30/22  0629 03/31/22  0749 04/01/22  0539   INR 8.8* 8.4* 6.5*     Chemistry:  Recent Labs     03/30/22  0629 03/30/22  1506 03/31/22  0749     --  138   K 2.5*  --  2.9*   CL 99  --  99   CO2 30  --  29   GLUCOSE 103*  --  97   BUN 15  --  13   CREATININE 0.70  --  0.58*   MG 1.7  --  1.6   ANIONGAP 10  --  10   LABGLOM >60  --  >60   GFRAA >60  --  >60   CALCIUM 8.5*  --  8.0*   PSA  --  <0.02  --    No results for input(s): PROT, LABALBU, LABA1C, A8HNHIM, Q5EARCY, FT4, TSH, AST, ALT, LDH, GGT, ALKPHOS, LABGGT, BILITOT, BILIDIR, AMMONIA, AMYLASE, LIPASE, LACTATE, CHOL, HDL, LDLCHOLESTEROL, CHOLHDLRATIO, TRIG, VLDL, KIU98PO, PHENYTOIN, PHENYF, URICACID, POCGLU in the last 72 hours. ABG:No results found for: POCPH, PHART, PH, POCPCO2, DSF9NNG, PCO2, POCPO2, PO2ART, PO2, POCHCO3, GGZ7SAE, HCO3, NBEA, PBEA, BEART, BE, THGBART, THB, FXI1OJB, DEZL8JVK, O0WKCUIE, O2SAT, FIO2  Lab Results   Component Value Date/Time    SPECIAL ROOM 130 03/15/2022 05:15 PM     Lab Results   Component Value Date/Time    CULTURE MORGANELLA MORGANII >842669 CFU/ML (A) 03/15/2022 05:15 PM    CULTURE  03/15/2022 05:15 PM     GRAM NEGATIVE RODS 50 to 100,000 CFU/ML Susceptibility testing not performed on low colony count organisms. Radiology:  CT ABDOMEN PELVIS WO CONTRAST Additional Contrast? None    Result Date: 3/28/2022  1. Large amount of stool within the distal colon and rectum with wall thickening, concerning for stercoral colitis. 2. Marked distention of the urinary bladder with bilateral hydroureteronephrosis, concerning for urinary outlet obstruction. No nephrolithiasis. 3. Bilateral pleural effusions with adjacent consolidation, atelectasis versus pneumonia.        Physical Examination:        General appearance: alert, cooperative and no distress  Mental Status:  oriented to person, place and time and normal affect  Lungs:  clear to auscultation bilaterally, normal effort  Heart:  regular rate and rhythm, no murmur  Abdomen:  soft, nontender, nondistended, normal bowel sounds, no masses, hepatomegaly, splenomegaly  Extremities:  no edema, redness, tenderness in the calves  Skin:  no gross lesions, rashes, induration    Assessment:        Hospital Problems           Last Modified POA    * (Principal) Stercoral colitis 3/29/2022 Yes    Atrial fibrillation ([address] 3/29/2022 Yes    Chronic anticoagulation 3/29/2022 Yes    Pneumonia of right lower lobe due to infectious organism 3/29/2022 Yes    Moderate malnutrition ([address] 3/29/2022 Yes    Acute urinary retention 3/29/2022 Yes    Bilateral hydronephrosis 3/29/2022 Yes          Plan:        1. PT and OT, patient refused to work with this morning, encouraged to work with therapy throughout the day. 2. Maintain Reyes catheter per urology recommendations  3. Continue to hold Coumadin for supratherapeutic INR  4. Cardiac medications as ordered  5. GI prophylaxis  6. Supplement electrolytes as needed  7.  Discharge home with home health aide    Ortega Hewitt DO  4/1/2022  11:29 AM

## 2022-04-01 NOTE — PLAN OF CARE
Problem: Falls - Risk of:  Goal: Will remain free from falls  Description: Will remain free from falls  4/1/2022 0639 by Pili Ruiz RN  Outcome: Ongoing  3/31/2022 1842 by Israel Luke RN  Outcome: Ongoing  Goal: Absence of physical injury  Description: Absence of physical injury  4/1/2022 0639 by Pili Ruiz RN  Outcome: Ongoing  3/31/2022 1842 by Israel Luke RN  Outcome: Ongoing     Problem: Skin Integrity:  Goal: Will show no infection signs and symptoms  Description: Will show no infection signs and symptoms  4/1/2022 0639 by Pili Ruiz RN  Outcome: Ongoing  3/31/2022 1842 by Israel Luke RN  Outcome: Ongoing  Goal: Absence of new skin breakdown  Description: Absence of new skin breakdown  4/1/2022 0639 by Pili Ruiz RN  Outcome: Ongoing  3/31/2022 1842 by Israel Luke RN  Outcome: Ongoing     Problem: Nutrition  Goal: Optimal nutrition therapy  4/1/2022 0639 by Pili Ruiz RN  Outcome: Ongoing  3/31/2022 1842 by Israel Luke RN  Outcome: Ongoing     Problem: Urinary Retention:  Goal: Urinary elimination within specified parameters  Description: Urinary elimination within specified parameters  4/1/2022 0639 by Pili Ruiz RN  Outcome: Ongoing  3/31/2022 1842 by Israel Luke RN  Outcome: Ongoing  Goal: Able to perform urinary catheter care  Description: Able to perform urinary catheter care  4/1/2022 0639 by Pili Ruiz RN  Outcome: Ongoing  3/31/2022 1842 by Israel Luke RN  Outcome: Ongoing  Goal: Able to perform urinary self-catheterization  Description: Able to perform urinary self-catheterization  4/1/2022 0639 by Pili Ruiz RN  Outcome: Ongoing  3/31/2022 1842 by Israel Luke RN  Outcome: Ongoing  Goal: Ability to reestablish a normal urinary elimination pattern will improve - after catheter removal  Description: Ability to reestablish a normal urinary elimination pattern will improve - after catheter removal  4/1/2022 0639 by Kevin Valero RN  Outcome: Ongoing  3/31/2022 1842 by Marisol Kothari RN  Outcome: Ongoing  Goal: Ability to recognize the need to void and respond appropriately will improve  Description: Ability to recognize the need to void and respond appropriately will improve  4/1/2022 0639 by Kevin Valero RN  Outcome: Ongoing  3/31/2022 1842 by Marisol Kothari RN  Outcome: Ongoing  Goal: Absence of postvoid residual urine  Description: Absence of postvoid residual urine  4/1/2022 0639 by Kevin Valero RN  Outcome: Ongoing  3/31/2022 1842 by Marisol Kothari RN  Outcome: Ongoing

## 2022-04-01 NOTE — PROGRESS NOTES
Patient still unable to void. Bladder scanned patient, results state \">200ml\" Dr. Vicente Aguayo notified and mathews catheter was ordered.

## 2022-04-01 NOTE — PROGRESS NOTES
Physical Therapy  DATE: 2022    NAME: Lauren Khan Ramónlinnette  MRN: [id number]   : 1931    Patient not seen this date for Physical Therapy due to:      [] Cancel by RN or physician due to:    [] Hemodialysis    [] Critical Lab Value Level     [] Blood transfusion in progress    [] Acute or unstable cardiovascular status   _MAP < 55 or more than >115  _HR < 40 or > 130    [] Acute or unstable pulmonary status   -FiO2 > 60%   _RR < 5 or >40    _O2 sats < 85%    [] Strict Bedrest    [] Off Unit for surgery or procedure    [] Off Unit for testing       [] Pending imaging to R/O fracture    [x] Refusal by Patient (Patient declined PT treatment with encouragement given but not agreeable. RN notified.)     [] Other      [] PT being discontinued at this time. Patient independent. No further needs. [] PT being discontinued at this time as the patient has been transferred to hospice care. No further needs.       Miles Lama, PTA x

## 2022-04-01 NOTE — PROGRESS NOTES
Mathews catheter was placed without complication. 600 ml of urine immediately filled mathews bag. Will continue to monitor.

## 2022-04-01 NOTE — PROGRESS NOTES
Writer reviewed discharge instructions with patient and caregiver's. They verbalized understanding. Signature obtained.  Patient sent home with belongings, script for Flomax informed caregiver of to picked up script for miralax

## 2022-04-01 NOTE — DISCHARGE SUMMARY
[facility]  Office: [address], DO, Nasir Smoker, DO, Manasa [facility], DO, Jessica Colónn Blood, DO, Colonel Cuate MD, Cookie Domingo MD, Jayde Ham MD, Brandy Luna MD, Yaron Peguero MD, Ricardo Barboza MD, Zamzam Zamarripa MD, Amber Sanz, DO, Clarence Mccord, DO, Citlaly Reyes MD,  Linden Brown, DO, Edgar Mcclain MD, Marilynn Hanson MD, Rakel Ledezma MD, Vinh Mantilla, DO, Paola Amor MD, Fabián Washington MD, Kevan Muniz, [facility], [facility], CNP, Hallie Farrar, CNP, Di Garcia, CNS, Philipp Voss, CNP, Adryan Cope, CNP, Maura Grimes, CNP, Jovany Wright, CNP, Javier Rendon, CNP, Trina Rose PA-C, Cordelia Connelly, [facility], Arie Sim [facility], Arthur Lua, CNP, Deanna Mireles, CNP, Segundo Graham, [facility]    Discharge Summary     Patient ID: Gin Garduno  :  1931   MRN: [id number]     ACCOUNT:  [de-identified]   Patient's PCP: Crow Umaña DO  Admit Date: 3/28/2022   Discharge Date: 2022     Length of Stay: 4  Code Status:  Full Code  Admitting Physician: Stefanie Orozco DO  Discharge Physician: Stefanie Orozco DO     Active Discharge Diagnoses:     Hospital Problem Lists:  Principal Problem:    Stercoral colitis  Active Problems:    Atrial fibrillation [address] [address]    Chronic anticoagulation    Pneumonia of right lower lobe due to infectious organism    Moderate malnutrition ([address]    Acute urinary retention    Bilateral hydronephrosis  Resolved Problems:    Colitis      Admission Condition:  fair     Discharged Condition: stable    Hospital Stay:     Hospital Course:  Gin Garduno is a 80 y.o. male who was admitted for the management of  Stercoral colitis , presented to ER with Abdominal Pain (raised ball on abdomen, no pain)    This is a 31-year-old male who presents with complaint of abdominal pain and distention with reports of no BM for approximately 2 weeks.   He was found to have fecal impaction and constipation was admitted for further evaluation and surgical consultation. After surgical evaluation was completed he underwent several soapsuds enemas with resolution of his impaction and constipation issues. At the time issues also found to have urinary retention had a Reyes catheter placed. Once his constipation was resolved he underwent a void trial but continued to have episodes of retention and a Reyes catheter was replaced and urology consultation obtained. Ultimately he underwent void trial again 24 hours later with continued retention at this time will be discharged home with a catheter. Significant therapeutic interventions: As above    Significant Diagnostic Studies:   Labs / Micro:  CBC:   Lab Results   Component Value Date    WBC 7.0 03/28/2022    RBC 4.12 03/28/2022    HGB 10.5 03/28/2022    HCT 33.7 03/28/2022    MCV 81.8 03/28/2022    MCH 25.5 03/28/2022    MCHC 31.2 03/28/2022    RDW 22.7 03/28/2022     03/28/2022     BMP:    Lab Results   Component Value Date    GLUCOSE 97 03/31/2022     03/31/2022    K 2.9 03/31/2022    CL 99 03/31/2022    CO2 29 03/31/2022    ANIONGAP 10 03/31/2022    BUN 13 03/31/2022    CREATININE 0.58 03/31/2022    BUNCRER 22 03/31/2022    CALCIUM 8.0 03/31/2022    LABGLOM >60 03/31/2022    GFRAA >60 03/31/2022    GFR      03/31/2022        Radiology:  CT ABDOMEN PELVIS WO CONTRAST Additional Contrast? None    Result Date: 3/28/2022  1. Large amount of stool within the distal colon and rectum with wall thickening, concerning for stercoral colitis. 2. Marked distention of the urinary bladder with bilateral hydroureteronephrosis, concerning for urinary outlet obstruction. No nephrolithiasis. 3. Bilateral pleural effusions with adjacent consolidation, atelectasis versus pneumonia.        Consultations:    Consults:     Final Specialist Recommendations/Findings:   IP CONSULT TO INTERNAL MEDICINE  PHARMACY TO DOSE WARFARIN  IP CONSULT TO GENERAL SURGERY  IP CONSULT TO UROLOGY      The patient was seen and examined on day of discharge and this discharge summary is in conjunction with any daily progress note from day of discharge. Discharge plan:     Disposition: Home    Physician Follow Up:   PCP 1 week, urology 1 week  No follow-up provider specified. Requiring Further Evaluation/Follow Up POST HOSPITALIZATION/Incidental Findings: INR on Monday    Diet: cardiac diet    Activity: As tolerated    Instructions to Patient: Hold Coumadin the next 48 hours, repeat INR Monday.   Take medication as prescribed    Discharge Medications:      Medication List      START taking these medications    polyethylene glycol 17 g packet  Commonly known as: GLYCOLAX  Take 17 g by mouth 2 times daily     tamsulosin 0.4 MG capsule  Commonly known as: FLOMAX  Take 1 capsule by mouth daily  Start taking on: April 2, 2022        CONTINUE taking these medications    digoxin 125 MCG tablet  Commonly known as: LANOXIN  Take 1 tablet by mouth daily     dilTIAZem 360 MG extended release capsule  Commonly known as: TIAZAC     ferrous sulfate 325 (65 Fe) MG EC tablet  Commonly known as: FE TABS 325     furosemide 40 MG tablet  Commonly known as: LASIX     magnesium hydroxide 400 MG/5ML suspension  Commonly known as: MILK OF MAGNESIA     potassium chloride 20 MEQ Tbcr extended release tablet  Commonly known as: KLOR-CON M     senna-docusate 8.6-50 MG per tablet  Commonly known as: PERICOLACE     warfarin 4 MG tablet  Commonly known as: COUMADIN        STOP taking these medications    ciprofloxacin 250 MG tablet  Commonly known as: CIPRO           Where to Get Your Medications      These medications were sent to [facility],  [address]  [address]    Phone: [phone]   · tamsulosin 0.4 MG capsule     You can get these medications from any pharmacy    You don't need a prescription for these medications  · polyethylene glycol 17 g packet         No discharge procedures on file. Time Spent on discharge is  28 minutes in patient examination, evaluation, counseling as well as medication reconciliation, prescriptions for required medications, discharge plan and follow up. Electronically signed by   Stefanie Orozco DO  4/1/2022  12:58 PM      Thank you Dr. Crow Umaña DO for the opportunity to be involved in this patient's care.

## 2022-04-01 NOTE — CARE COORDINATION
Pt discharged per Dr. Sandra Chung. Has friend coming to provide transportation home with 24/7 care by [facility]. Will return home with bisi and to F/U with Dr. Judge Abarca next week for void trial and catheter removal.    Gen Simms with [facility] informed of discharged and will resume care Saturday.   Office will pull CHRISTA from [facility]

## 2022-04-01 NOTE — PROGRESS NOTES
[facility]   Urology Progress Note            Subjective: Follow-up urinary retention, failed void trial x2    Patient Vitals for the past 24 hrs:   BP Temp Temp src Pulse Resp SpO2 Weight   04/01/22 0726 (!) 121/57 97.2 °F (36.2 °C) Oral 146 15 96 % --   04/01/22 0109 -- -- -- -- -- -- 167 lb (75.8 kg)   03/31/22 2037 (!) 115/51 97.7 °F (36.5 °C) Oral 77 18 97 % --   03/31/22 1533 121/61 98.1 °F (36.7 °C) Oral 73 16 97 % --   03/31/22 1126 119/63 97.9 °F (36.6 °C) Oral 74 16 97 % --       Intake/Output Summary (Last 24 hours) at 4/1/2022 0818  Last data filed at 4/1/2022 8807  Gross per 24 hour   Intake --   Output 1275 ml   Net -1275 ml       No results for input(s): WBC, HGB, HCT, MCV, PLT in the last 72 hours. Recent Labs     03/30/22  0629 03/31/22  0749    138   K 2.5* 2.9*   CL 99 99   CO2 30 29   BUN 15 13   CREATININE 0.70 0.58*       Recent Labs     03/30/22  1845   COLORU Yellow   PHUR 7.0   WBCUA 0 TO 2   RBCUA 10 TO 20   SPECGRAV 1.015   LEUKOCYTESUR NEGATIVE   UROBILINOGEN Normal   BILIRUBINUR NEGATIVE       Additional Lab/culture results:    Physical Exam: Reyes catheter was reinserted last night greater than 700 mL residual  I have discussed his urologic care history and findings with daughter Ragini Hope and caregiver Sia Malone    Interval Imaging Findings:    Impression:    Patient Active Problem List   Diagnosis    Atrial fibrillation ([address]    Hypokalemia    Hypothyroidism    Overactive bladder    Elevated brain natriuretic peptide (BNP) level    General weakness    Chronic anticoagulation    Stercoral colitis    Pneumonia of right lower lobe due to infectious organism    Moderate malnutrition ([address]    Acute urinary retention    Bilateral hydronephrosis       Plan:  At the present time I would recommend to maintain the indwelling Reyes, continue with alpha-blocker, a follow-up visit at the office next week for void trial and catheter removal.    I advised the daughter who lives in [place] to make sure stay in contact with our office in any issues or concerns regarding his urologic care    Chauncey Castle MD  8:18 AM 4/1/2022

## 2022-04-01 NOTE — DISCHARGE INSTR - DIET

## 2022-04-01 NOTE — PROGRESS NOTES
Occupational Therapy  DATE: 2022    NAME: Lyn Sos  Brandon Eduardo  MRN: [id number]   : 1931    Patient not seen this date for Occupational Therapy due to:      [] Cancel by RN or physician due to:    [] Hemodialysis    [] Critical Lab Value Level     [] Blood transfusion in progress    [] Acute or unstable cardiovascular status   _MAP < 55 or more than >115  _HR < 40 or > 130    [] Acute or unstable pulmonary status   -FiO2 > 60%   _RR < 5 or >40    _O2 sats < 85%    [] Strict Bedrest    [] Off Unit for surgery or procedure    [] Off Unit for testing       [] Pending imaging to R/O fracture    [x] Refusal by Patient : Pt. Declined treatment after much encouragement. OT will check back if time permits. [] Other      [] OT being discontinued at this time. Patient independent. No further needs. [] OT being discontinued at this time as the patient has been transferred to hospice care. No further needs.       Elliot Blanc CHOLO

## 2022-04-01 NOTE — DISCHARGE INSTR - COC
Continuity of Care Form    Patient Name: [facility]  Jean Marie Dee   :  1931  MRN:  [id number]    Admit date:  3/28/2022  Discharge date:  22    Code Status Order: Full Code   Advance Directives:      Admitting Physician:  Juliane Bautista DO  PCP: Ming Camp DO    Discharging Nurse: [facility] Unit/Room#:   Discharging Unit Phone Number: [phone]    Emergency Contact:   Extended Emergency Contact Information  Primary Emergency Contact: john shea  Mobile Phone: [phone]  Relation: Other  Secondary Emergency Contact: Lula Gary  Mobile Phone: [phone]  Relation: None    Past Surgical History:  Past Surgical History:   Procedure Laterality Date    HERNIA REPAIR Right        Immunization History: There is no immunization history on file for this patient.     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    Pneumonia of right lower lobe due to infectious organism J18.9    Moderate malnutrition (HCC) E44.0    Acute urinary retention R33.8    Bilateral hydronephrosis N13.30       Isolation/Infection:   Isolation            No Isolation          Patient Infection Status       None to display            Nurse Assessment:  Last Vital Signs: BP (!) 125/53   Pulse 77   Temp 97.9 °F (36.6 °C) (Oral)   Resp 19   Ht 5' 4\" (1.626 m)   Wt 167 lb (75.8 kg)   SpO2 97%   BMI 28.67 kg/m²     Last documented pain score (0-10 scale): Pain Level: 0  Last Weight:   Wt Readings from Last 1 Encounters:   22 167 lb (75.8 kg)     Mental Status:  oriented, alert, and forgetul    IV Access:  - None    Nursing Mobility/ADLs:  Walking   Assisted  Transfer  Assisted  Bathing  Assisted  Dressing Assisted  Toileting  Dependent  Feeding  Independent  Med Admin  Independent  Med Delivery   whole    Wound Care Documentation and Therapy:        Elimination:  Continence: Bowel: No  Bladder: No  Urinary Catheter: Insertion Date: 4/1/22    Colostomy/Ileostomy/Ileal Conduit: No       Date of Last BM: 4/1/22    Intake/Output Summary (Last 24 hours) at 4/1/2022 1258  Last data filed at 4/1/2022 6808  Gross per 24 hour   Intake 120 ml   Output 750 ml   Net -630 ml     I/O last 3 completed shifts:  In: -   Out: [address] May[address] Bojorquez [Urine:1875]    Safety Concerns: At Risk for Falls    Impairments/Disabilities:      Hearing    Nutrition Therapy:  Current Nutrition Therapy:   Diet: Cardiac    Routes of Feeding: Oral  Liquids: No Restrictions  Daily Fluid Restriction: no  Last Modified Barium Swallow with Video (Video Swallowing Test): not done    Treatments at the Time of Hospital Discharge:   Respiratory Treatments: ***  Oxygen Therapy:  is not on home oxygen therapy.   Ventilator:    - No ventilator support    Rehab Therapies: Physical Therapy Occupational therapy   Weight Bearing Status/Restrictions: No weight bearing restrictions  Other Medical Equipment (for information only, NOT a DME order):  walker  Other Treatments: Skilled nursing assessment  Med teaching and compliance      Patient's personal belongings (please select all that are sent with patient):  Bharath    RN SIGNATURE:  Electronically signed by Tina Molina RN on 4/1/22 at 1:14 PM EDT    CASE MANAGEMENT/SOCIAL WORK SECTION    Inpatient Status Date: ***    Readmission Risk Assessment Score:  Readmission Risk              Risk of Unplanned Readmission:  18           Discharging to Facility/ Agency   Name: [facility]  Address:  Phone: [phone]  Fax: [phone]      / signature: Electronically signed by Pam Liu RN on 4/1/22 at 1:33 PM EDT    PHYSICIAN SECTION    Prognosis: Fair    Condition at Discharge: Stable    Rehab Potential (if transferring to Rehab): Fair    Recommended Labs or Other Treatments After Discharge: Reyes care, follow-up with urology in 1 week. Hold Coumadin Saturday and Sunday, repeat INR Monday with follow-up instructions on dose at that time. Physician Certification: I certify the above information and transfer of Cherelle Water[place] Dr Sandy Reyes  is necessary for the continuing treatment of the diagnosis listed and that he requires Home Care for greater 30 days.      Update Admission H&P: No change in H&P    PHYSICIAN SIGNATURE:  Electronically signed by Sarah Herman DO on 4/1/22 at 12:59 PM EDT

## 2022-04-01 NOTE — PLAN OF CARE
Problem: Falls - Risk of:  Goal: Will remain free from falls  Description: Will remain free from falls  4/1/2022 1311 by Alison Malone RN  Outcome: Ongoing  Note: Bed in lowest position, call light within reach, side rails x 2 hourly rounding, bed/chair alarm , fallen star  4/1/2022 0639 by Lindsey Polo RN  Outcome: Ongoing

## 2022-04-04 ENCOUNTER — HOSPITAL ENCOUNTER (OUTPATIENT)
Age: 87
Setting detail: SPECIMEN
Discharge: HOME | End: 2022-04-04

## 2022-04-04 LAB
INR BLD: 2.5
PROTHROMBIN TIME: 24.6 SEC (ref 9.1–12.3)

## 2022-04-11 NOTE — PROGRESS NOTES
Physician Progress Note      Kp COFFEY #:                  [id number]  :                       1931  ADMIT DATE:       3/28/2022 8:45 PM  [address] [place] DATE:        2022 2:30 PM  RESPONDING  PROVIDER #:        Lena SHELDON DO          QUERY TEXT:    Patient admitted with fecal impaction. Noted documentation of pneumonia in H&P   3/29, & subsequent documentation including DS . In order to support the   diagnosis of pneumonia, please include additional clinical indicators in your   documentation. Or please document if the diagnosis of pneumonia has been   ruled out after further study. The medical record reflects the following:  Risk Factors: advanced age of 80, recurrent admissions  Clinical Indicators: CT chest: Bilateral pleural effusions with adjacent   consolidation, atelectasis versus pneumonia. Afebrile, on RA throughout   admission,  RR 15-19. HP noted to start IV antibiotics for 24 hours and reassess. Suspect   findings are more consistent with atelectasis. Treatment: Levaquin x2 doses then DC'd , no Antibiotics for DC to home    Based on clinical indicators, the diagnosis of pneumonia may be challenged on   a clinical basis by an external reviewer. Although pneumonia has been   documented and may be reported solely on documentation, it cannot be   clinically validated. HP noted to start IV antibiotics for 24 hours and   reassess. Suspect findings are more consistent with atelectasis. IV Levaquin   was discontinued after 1 day. In the absence of adequate antibiotic treatment,   pneumonia may be challenged  Options provided:  -- Pneumonia present as evidenced by, Please document evidence.   -- Pneumonia was ruled out  -- Other - I will add my own diagnosis  -- Disagree - Not applicable / Not valid  -- Disagree - Clinically unable to determine / Unknown  -- Refer to Clinical Documentation Reviewer    PROVIDER RESPONSE TEXT:    Pneumonia was ruled out after study.    Query created by: Ashley Armas on 4/7/2022 7:49 AM      Electronically signed by:  Ashlee Reyes DO 4/11/2022 6:48 AM

## 2022-04-13 ENCOUNTER — HOSPITAL ENCOUNTER (OUTPATIENT)
Age: 87
Setting detail: OUTPATIENT SURGERY
Discharge: HOME | End: 2022-04-13
Attending: UROLOGY | Admitting: UROLOGY
Payer: MEDICARE

## 2022-04-13 VITALS
HEART RATE: 60 BPM | TEMPERATURE: 97.3 F | HEIGHT: 64 IN | DIASTOLIC BLOOD PRESSURE: 57 MMHG | WEIGHT: 167 LBS | SYSTOLIC BLOOD PRESSURE: 124 MMHG | RESPIRATION RATE: 16 BRPM | OXYGEN SATURATION: 97 % | BODY MASS INDEX: 28.51 KG/M2

## 2022-04-13 LAB
-: NORMAL
ANION GAP SERPL CALCULATED.3IONS-SCNC: 10 MMOL/L (ref 9–17)
BILIRUBIN URINE: NEGATIVE
BUN BLDV-MCNC: 11 MG/DL (ref 8–23)
BUN/CREAT BLD: 21 (ref 9–20)
CALCIUM SERPL-MCNC: 8.4 MG/DL (ref 8.6–10.4)
CHLORIDE BLD-SCNC: 95 MMOL/L (ref 98–107)
CO2: 27 MMOL/L (ref 20–31)
COLOR: YELLOW
CREAT SERPL-MCNC: 0.53 MG/DL (ref 0.7–1.2)
EPITHELIAL CELLS UA: NORMAL /HPF (ref 0–5)
GFR AFRICAN AMERICAN: >60 ML/MIN
GFR NON-AFRICAN AMERICAN: >60 ML/MIN
GFR SERPL CREATININE-BSD FRML MDRD: ABNORMAL ML/MIN/{1.73_M2}
GLUCOSE BLD-MCNC: 99 MG/DL (ref 70–99)
GLUCOSE URINE: NEGATIVE
INR BLD: 1.3
KETONES, URINE: NEGATIVE
LEUKOCYTE ESTERASE, URINE: ABNORMAL
NITRITE, URINE: NEGATIVE
PH UA: 6 (ref 5–8)
POTASSIUM SERPL-SCNC: 2.8 MMOL/L (ref 3.7–5.3)
PROTEIN UA: NEGATIVE
PROTHROMBIN TIME: 15.7 SEC (ref 11.5–14.2)
RBC UA: NORMAL /HPF (ref 0–2)
SODIUM BLD-SCNC: 132 MMOL/L (ref 135–144)
SPECIFIC GRAVITY UA: 1.01 (ref 1–1.03)
TURBIDITY: ABNORMAL
URINE HGB: ABNORMAL
UROBILINOGEN, URINE: NORMAL
WBC UA: NORMAL /HPF (ref 0–5)

## 2022-04-13 PROCEDURE — 7100000010 HC PHASE II RECOVERY - FIRST 15 MIN: Performed by: UROLOGY

## 2022-04-13 PROCEDURE — 85610 PROTHROMBIN TIME: CPT

## 2022-04-13 PROCEDURE — 87186 SC STD MICRODIL/AGAR DIL: CPT

## 2022-04-13 PROCEDURE — 2709999900 HC NON-CHARGEABLE SUPPLY: Performed by: UROLOGY

## 2022-04-13 PROCEDURE — 7100000011 HC PHASE II RECOVERY - ADDTL 15 MIN: Performed by: UROLOGY

## 2022-04-13 PROCEDURE — 81001 URINALYSIS AUTO W/SCOPE: CPT

## 2022-04-13 PROCEDURE — 6370000000 HC RX 637 (ALT 250 FOR IP): Performed by: UROLOGY

## 2022-04-13 PROCEDURE — 87086 URINE CULTURE/COLONY COUNT: CPT

## 2022-04-13 PROCEDURE — 36415 COLL VENOUS BLD VENIPUNCTURE: CPT

## 2022-04-13 PROCEDURE — 86403 PARTICLE AGGLUT ANTBDY SCRN: CPT

## 2022-04-13 PROCEDURE — 3600000012 HC SURGERY LEVEL 2 ADDTL 15MIN: Performed by: UROLOGY

## 2022-04-13 PROCEDURE — 3600000002 HC SURGERY LEVEL 2 BASE: Performed by: UROLOGY

## 2022-04-13 PROCEDURE — 80048 BASIC METABOLIC PNL TOTAL CA: CPT

## 2022-04-13 RX ORDER — CEPHALEXIN 500 MG/1
500 CAPSULE ORAL 2 TIMES DAILY
Qty: 10 CAPSULE | Refills: 0 | Status: SHIPPED | OUTPATIENT
Start: 2022-04-13 | End: 2022-04-18

## 2022-04-13 RX ORDER — POTASSIUM CHLORIDE 20 MEQ/1
40 TABLET, EXTENDED RELEASE ORAL ONCE
Status: COMPLETED | OUTPATIENT
Start: 2022-04-13 | End: 2022-04-13

## 2022-04-13 RX ORDER — LIDOCAINE HYDROCHLORIDE 20 MG/ML
JELLY TOPICAL PRN
Status: DISCONTINUED | OUTPATIENT
Start: 2022-04-13 | End: 2022-04-13 | Stop reason: ALTCHOICE

## 2022-04-13 RX ADMIN — POTASSIUM CHLORIDE 40 MEQ: 20 TABLET, EXTENDED RELEASE ORAL at 15:15

## 2022-04-13 ASSESSMENT — PAIN SCALES - GENERAL: PAINLEVEL_OUTOF10: 0

## 2022-04-13 ASSESSMENT — PAIN - FUNCTIONAL ASSESSMENT: PAIN_FUNCTIONAL_ASSESSMENT: 0-10

## 2022-04-13 NOTE — INTERVAL H&P NOTE
Provider, MD   digoxin (LANOXIN) 125 MCG tablet Take 1 tablet by mouth daily 3/3/22   Erica Pascal MD   warfarin (COUMADIN) 4 MG tablet Take 4 mg by mouth daily    Historical Provider, MD         This is a 80 y.o. male who is pleasant, cooperative, alert and oriented x3, in no acute distress    Physical Exam:  Lungs: Bilateral equal air entry, unlabored, clear to ausculation, no wheezing, rales or rhonchi, normal effort  Cardiovascular: distant heart sounds HR 60 irregularly irregular rhythm with controlled rate (Hx A Fib). no murmur, gallop, rub. Abdomen: Reyes cath to leg bag draining clear hematuria Abdomen soft, nontender, nondistended, active bowel sounds. Extremities:  bilateral 1-2+ edema below calf  No calf tenderness   Labs:  Recent Labs     04/04/22  2109 04/01/22  0539 03/31/22  0749 03/29/22  0524 03/28/22  2115   HGB  --   --   --   --  10.5*   HCT  --   --   --   --  33.7*   WBC  --   --   --   --  7.0   MCV  --   --   --   --  81.8*   PLT  --   --   --   --  255   NA  --   --  138   < > 134*   K  --   --  2.9*   < > 3.3*   CL  --   --  99   < > 98   CO2  --   --  29   < > 26   BUN  --   --  13   < > 24*   CREATININE  --   --  0.58*   < > 1.01   GLUCOSE  --   --  97   < > 111*   INR 2.5   < > 8.4*   < >  --    PROTIME 24.6*   < > 69.5*   < >  --    AST  --   --   --   --  25   ALT  --   --   --   --  19   LABALBU  --   --   --   --  2.9*    < > = values in this interval not displayed. No results for input(s): COVID19 in the last 720 hours.     MISTI Aldrich CNP   Electronically signed 4/13/2022 at 2:22 PM

## 2022-04-13 NOTE — OP NOTE
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  YOB: 1931  MRN: [id number]    Date of Procedure: 4/13/2022    Pre-Op Diagnosis: DX enlarged prostate, bladder outlet obstruction, failed void trial    Post-Op Diagnosis: Same and With chronic catheter cystitis       Procedure(s):  CYSTOSCOPY   UROFLOW   DILATION    Surgeon(s):  Sabrina Rivera MD    Assistant:   * No surgical staff found *    Anesthesia: Local    Estimated Blood Loss (mL): Minimal    Complications: None    Specimens:   ID Type Source Tests Collected by Time Destination   1 : CYSTO URINE Urine Urine, Cystoscopic CULTURE, URINE, URINALYSIS WITH REFLEX TO CULTURE Sabrina Rivera MD 4/13/2022 1551        Implants:  * No implants in log *      Drains:   Urethral Catheter Coude 16 fr (Active)       [REMOVED] Urethral Catheter Coude 14 fr (Removed)   Catheter Indications Urinary retention (acute or chronic), continuous bladder irrigation or bladder outlet obstruction 03/29/22 2000   Urine Color Yellow 03/29/22 2000   Urine Appearance Clear 03/29/22 2000   Output (mL) 500 mL 03/30/22 0930       [REMOVED] Urethral Catheter Double-lumen 16 fr (Removed)   Catheter Indications Urinary retention (acute or chronic), continuous bladder irrigation or bladder outlet obstruction 03/30/22 2000   Securement Device Date Changed 03/30/22 03/30/22 1845   Site Assessment No urethral drainage 03/31/22 0510   Urine Color Yellow 03/31/22 0510   Urine Appearance Clear 03/31/22 0510   Output (mL) 525 mL 03/31/22 0852       [REMOVED] Urethral Catheter 16 fr (Removed)   $ Urethral catheter insertion $ Not inserted for procedure 04/01/22 0000   Catheter Indications Urinary retention (acute or chronic), continuous bladder irrigation or bladder outlet obstruction 04/01/22 1432   Site Assessment No urethral drainage 04/01/22 1432   Urine Color Yellow 04/01/22 1432   Urine Appearance Clear 04/01/22 0400   Output (mL) 500 mL 04/01/22 1432       Findings: Indications: 59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 We entered the bladder with the cystoscope, lateral lobe hypertrophy of the prostate was identified,    The interior of the bladder was carefully examined, hemorrhagic cystitis changes and catheter cystitis changes from the indwelling Reyes. There were no tumors ulcers or stones. At the completion of the cystoscopy the cystoscope was removed, we then used the [employer] sounds for urethral dilatation. Urethral dilatation through size 22 [place] without complications. We then proceeded with the uroflow study. The bladder was filled up to capacity, for sensation of fullness at 100 mL, the patient had a strong urge at 200 mL, full capacity was 250 mL. At that point in time he was asked to void, he voided 230 mL with a peak flow of 15 mL and he did not have any significant residual.    Assessment enlarged prostate bladder outlet obstruction urethral stricture dilated.     Recommendation the patient will return to the office in 2 weeks for a void trial.    We explained to the patient and his caregiver that constipation would be real problematic and contributing to recurrent urinary retention he voiced understanding    Electronically signed by Sánchez Fischer MD on 4/13/2022 at 4:03 PM

## 2022-04-14 NOTE — CONSULTS
Came to see Dr. Katie Ellison and he told me that his atrial fibrillation is chronic and that he did not need to see a cardiologist regarding his atrial fibrillation and that he just needed treatment for his dehydration and he feels he is receiving appropriate treatment. He did not want a cardiology consult at this time. We will be available if needed. Above was discussed with [facility] RN.     Johnathan Weeks, MISTI - CNP Gi office and follow prompts for emergency/911

## 2022-04-15 LAB
CULTURE: ABNORMAL
SPECIMEN DESCRIPTION: ABNORMAL

## 2022-05-10 ENCOUNTER — HOSPITAL ENCOUNTER (OUTPATIENT)
Age: 87
Setting detail: SPECIMEN
Discharge: HOME | End: 2022-05-10

## 2022-05-11 LAB
INR BLD: 3.5
PROTHROMBIN TIME: 33.9 SEC (ref 9.1–12.3)

## 2022-05-27 ENCOUNTER — HOSPITAL ENCOUNTER (OUTPATIENT)
Age: 87
Setting detail: SPECIMEN
Discharge: HOME | End: 2022-05-27

## 2022-05-27 LAB
INR BLD: 1.4
PROTHROMBIN TIME: 15 SEC (ref 9.1–12.3)

## 2022-07-27 ENCOUNTER — HOSPITAL ENCOUNTER (OUTPATIENT)
Age: 87
Setting detail: SPECIMEN
Discharge: HOME | End: 2022-07-27

## 2022-07-27 LAB
ANION GAP SERPL CALCULATED.3IONS-SCNC: 22 MMOL/L (ref 9–17)
BUN BLDV-MCNC: 15 MG/DL (ref 8–23)
CALCIUM SERPL-MCNC: 9.5 MG/DL (ref 8.6–10.4)
CHLORIDE BLD-SCNC: 98 MMOL/L (ref 98–107)
CO2: 23 MMOL/L (ref 20–31)
CREAT SERPL-MCNC: 0.67 MG/DL (ref 0.7–1.2)
DIGOXIN DATE LAST DOSE: NORMAL
DIGOXIN DOSE AMOUNT: NORMAL
DIGOXIN DOSE TIME: NORMAL
DIGOXIN LEVEL: 1.2 NG/ML (ref 0.5–2)
GFR AFRICAN AMERICAN: >60 ML/MIN
GFR NON-AFRICAN AMERICAN: >60 ML/MIN
GFR SERPL CREATININE-BSD FRML MDRD: ABNORMAL ML/MIN/{1.73_M2}
GLUCOSE BLD-MCNC: 64 MG/DL (ref 70–99)
INR BLD: 1.9
POTASSIUM SERPL-SCNC: 3.1 MMOL/L (ref 3.7–5.3)
PROTHROMBIN TIME: 19.2 SEC (ref 9.1–12.3)
SODIUM BLD-SCNC: 143 MMOL/L (ref 135–144)

## (undated) DEVICE — GLOVE SURG SZ 7 CRM LTX FREE POLYISOPRENE POLYMER BEAD ANTI

## (undated) DEVICE — Device

## (undated) DEVICE — Z DUP USE 2522782 SOLUTION IRRIG 1000ML STRL H2O PLAS CONTAINER UROMATIC

## (undated) DEVICE — CATHETER,FOLEY,COUDE,LATEX,16FR,10ML: Brand: MEDLINE